# Patient Record
Sex: FEMALE | Race: WHITE | NOT HISPANIC OR LATINO | URBAN - METROPOLITAN AREA
[De-identification: names, ages, dates, MRNs, and addresses within clinical notes are randomized per-mention and may not be internally consistent; named-entity substitution may affect disease eponyms.]

---

## 2021-08-03 ENCOUNTER — EMERGENCY (EMERGENCY)
Facility: HOSPITAL | Age: 31
LOS: 1 days | Discharge: HOME | End: 2021-08-03
Admitting: EMERGENCY MEDICINE
Payer: COMMERCIAL

## 2021-08-03 VITALS
TEMPERATURE: 98 F | SYSTOLIC BLOOD PRESSURE: 116 MMHG | RESPIRATION RATE: 16 BRPM | HEART RATE: 114 BPM | OXYGEN SATURATION: 98 % | HEIGHT: 66 IN | WEIGHT: 154.98 LBS | DIASTOLIC BLOOD PRESSURE: 82 MMHG

## 2021-08-03 VITALS
TEMPERATURE: 99 F | RESPIRATION RATE: 16 BRPM | HEART RATE: 85 BPM | SYSTOLIC BLOOD PRESSURE: 115 MMHG | OXYGEN SATURATION: 98 % | DIASTOLIC BLOOD PRESSURE: 76 MMHG

## 2021-08-03 DIAGNOSIS — R21 RASH AND OTHER NONSPECIFIC SKIN ERUPTION: ICD-10-CM

## 2021-08-03 DIAGNOSIS — Z86.16 PERSONAL HISTORY OF COVID-19: ICD-10-CM

## 2021-08-03 DIAGNOSIS — B09 UNSPECIFIED VIRAL INFECTION CHARACTERIZED BY SKIN AND MUCOUS MEMBRANE LESIONS: ICD-10-CM

## 2021-08-03 DIAGNOSIS — F90.9 ATTENTION-DEFICIT HYPERACTIVITY DISORDER, UNSPECIFIED TYPE: ICD-10-CM

## 2021-08-03 DIAGNOSIS — F41.9 ANXIETY DISORDER, UNSPECIFIED: ICD-10-CM

## 2021-08-03 PROCEDURE — 99283 EMERGENCY DEPT VISIT LOW MDM: CPT

## 2021-08-03 NOTE — ED PROVIDER NOTE - CARE PROVIDERS DIRECT ADDRESSES
,[email or web address]

## 2021-08-03 NOTE — ED PROVIDER NOTE - PROVIDER TOKENS
PROVIDER:[TOKEN:[[id number]]],PROVIDER:[TOKEN:[[id number]]],PROVIDER:[TOKEN:[[id number]]]

## 2021-08-03 NOTE — ED ADULT TRIAGE NOTE - CHIEF COMPLAINT QUOTE
reports having a rash to groin last Thursday, now has progressed to arms and legs. denies pain or itching. no airway involvement. +appears comfortable

## 2021-08-03 NOTE — ED PROVIDER NOTE - OBJECTIVE STATEMENT
Patient presents with non pruritic rash that started over the R groin 6 days ago. she had spilled coffee over the R groin the previous day and had attributed rash to burn. Rash was initially more red and "angry" but has since improved. Patient presents with non pruritic rash that started over the R groin 6 days ago. she had spilled coffee over the R groin the previous day and had attributed rash to burn. Rash was initially more red and "angry" but has since improved. yesterday patient noted rash has spread to leg and arms. denies fever, throat swelling, tongue swelling, recent travel, use of new perfumes or detergents. denies any new medications. most recently had been started on lexapro 6months ago. admit to some nasal congestion that started before rash that she attributes to having the airconditioning on in her new apartment. denies hx of STD or possible exposures.

## 2021-08-03 NOTE — ED PROVIDER NOTE - CLINICAL SUMMARY MEDICAL DECISION MAKING FREE TEXT BOX
patient PMHX ADD, anxiety presents with nonpruritic papular rash over extremities sparing palms and soles. likely viral exanthem. advised follow up dermatologist.

## 2021-08-03 NOTE — ED ADULT NURSE NOTE - OBJECTIVE STATEMENT
rash to body since friday, initially started in let groin and spread. denies itching/pain. no known allergies, no s/s of distress, awaiting provider eval

## 2021-08-03 NOTE — ED ADULT NURSE NOTE - PRO INTERPRETER NEED 2
Per Dr. Conrad,  negative for infection.  Advise pt to drop of urine sample whenever she has sx of UTI.  Pt informed and verbalized understanding.  Standing UA ordered.  
English

## 2021-08-03 NOTE — ED PROVIDER NOTE - CARE PROVIDER_API CALL
Pauline Jimenez)  Dermatology  [address] [place] [address], [place] [address]  Phone: [phone]  Fax: [phone]  Follow Up Time:     Lizet Ocasio)  Dermatology [address], [address]  [place] [address]  Phone: [phone]  Fax: [phone]  Follow Up Time:     Brad Negrete ([address], [place] [address]  Phone: [phone]  Fax: [phone]  Follow Up Time:

## 2021-08-03 NOTE — ED PROVIDER NOTE - PATIENT PORTAL LINK FT
You can access the FollowMyHealth Patient Portal offered by [facility] by registering at the following website: [email or web address]. By joining [employer]’s FollowMyHealth portal, you will also be able to view your health information using other applications (apps) compatible with our system.

## 2021-08-03 NOTE — ED ADULT NURSE REASSESSMENT NOTE - NS ED NURSE REASSESS COMMENT FT1
Pt received from TINO Wolfe. Pt resting comfortably in stretcher, denies further complaints at this time. Awaiting dispo. Breathing spontaneous and nonlabored.

## 2021-08-27 ENCOUNTER — APPOINTMENT (OUTPATIENT)
Dept: DERMATOLOGY | Facility: CLINIC | Age: 31
End: 2021-08-27

## 2022-09-07 NOTE — ED ADULT NURSE NOTE - NSFALLRSKHARMRISK_ED_ALL_ED
· Patient with history of gastric bypass surgery  Has had significant malnutrition issues and anastomotic ulcerations  Seen by Bariatric Service during recent hospitalization at [facility] who recommended prolonged course of TPN for nutritional optimization and possible revision of surgery in the future  · Was planned to have appt with bariatric sx on 8/20 however had to be rescheduled  · Given recurrent bacteremia; s/p GI consult; have recommended discontinuation of TPN  Planned for PEG placement tomorrow  · Nutrition on board, caloric intake monitoring   Liberize diet, nutrition supplements   · Glucose monitoring, hypoglycemic protocol in place no

## 2023-04-06 VITALS
WEIGHT: 134.92 LBS | HEIGHT: 66 IN | HEART RATE: 106 BPM | RESPIRATION RATE: 16 BRPM | TEMPERATURE: 98 F | DIASTOLIC BLOOD PRESSURE: 82 MMHG | SYSTOLIC BLOOD PRESSURE: 112 MMHG | OXYGEN SATURATION: 99 %

## 2023-04-06 LAB
APTT BLD: 28.4 SEC — SIGNIFICANT CHANGE UP (ref 27.5–35.5)
D DIMER BLD IA.RAPID-MCNC: 427 NG/ML DDU — HIGH
INR BLD: 1.04 — SIGNIFICANT CHANGE UP (ref 0.88–1.16)
PROTHROM AB SERPL-ACNC: 12.2 SEC — SIGNIFICANT CHANGE UP (ref 10.5–13.4)

## 2023-04-06 PROCEDURE — 99285 EMERGENCY DEPT VISIT HI MDM: CPT

## 2023-04-06 NOTE — ED ADULT TRIAGE NOTE - CHIEF COMPLAINT QUOTE
Pt. walk in for syncope with head injury that occurred at 4pm this afternoon. Vomited on the way to ED.

## 2023-04-06 NOTE — ED ADULT NURSE NOTE - OBJECTIVE STATEMENT
32y female presents to ED c/o syncope. States was walking up the stairs, subsequently felt sob once arriving at the top and woke up on the floor. +loc. +hematoma to forehead. Pt denies cp.

## 2023-04-07 ENCOUNTER — INPATIENT (INPATIENT)
Facility: HOSPITAL | Age: 33
LOS: 2 days | Discharge: HOME | DRG: 312 | End: 2023-04-10
Attending: INTERNAL MEDICINE | Admitting: INTERNAL MEDICINE
Payer: COMMERCIAL

## 2023-04-07 DIAGNOSIS — D69.6 THROMBOCYTOPENIA, UNSPECIFIED: ICD-10-CM

## 2023-04-07 DIAGNOSIS — D72.829 ELEVATED WHITE BLOOD CELL COUNT, UNSPECIFIED: ICD-10-CM

## 2023-04-07 DIAGNOSIS — R55 SYNCOPE AND COLLAPSE: ICD-10-CM

## 2023-04-07 DIAGNOSIS — F41.9 ANXIETY DISORDER, UNSPECIFIED: ICD-10-CM

## 2023-04-07 PROBLEM — U07.1 COVID-19: Chronic | Status: ACTIVE | Noted: 2021-08-03

## 2023-04-07 PROBLEM — F90.9 ATTENTION-DEFICIT HYPERACTIVITY DISORDER, UNSPECIFIED TYPE: Chronic | Status: ACTIVE | Noted: 2021-08-03

## 2023-04-07 LAB
A1C WITH ESTIMATED AVERAGE GLUCOSE RESULT: 4.6 % — SIGNIFICANT CHANGE UP (ref 4–5.6)
ALBUMIN SERPL ELPH-MCNC: 3.6 G/DL — SIGNIFICANT CHANGE UP (ref 3.4–5)
ALBUMIN SERPL ELPH-MCNC: 3.7 G/DL — SIGNIFICANT CHANGE UP (ref 3.3–5)
ALP SERPL-CCNC: 65 U/L — SIGNIFICANT CHANGE UP (ref 40–120)
ALP SERPL-CCNC: 75 U/L — SIGNIFICANT CHANGE UP (ref 40–120)
ALT FLD-CCNC: 22 U/L — SIGNIFICANT CHANGE UP (ref 10–45)
ALT FLD-CCNC: 40 U/L — SIGNIFICANT CHANGE UP (ref 12–42)
ANION GAP SERPL CALC-SCNC: 12 MMOL/L — SIGNIFICANT CHANGE UP (ref 5–17)
ANION GAP SERPL CALC-SCNC: 9 MMOL/L — SIGNIFICANT CHANGE UP (ref 9–16)
ANISOCYTOSIS BLD QL: SLIGHT — SIGNIFICANT CHANGE UP
APPEARANCE UR: CLEAR — SIGNIFICANT CHANGE UP
APTT BLD: 27.2 SEC — LOW (ref 27.5–35.5)
AST SERPL-CCNC: 35 U/L — SIGNIFICANT CHANGE UP (ref 10–40)
AST SERPL-CCNC: 51 U/L — HIGH (ref 15–37)
BASOPHILS # BLD AUTO: 0 K/UL — SIGNIFICANT CHANGE UP (ref 0–0.2)
BASOPHILS # BLD AUTO: 0.04 K/UL — SIGNIFICANT CHANGE UP (ref 0–0.2)
BASOPHILS # BLD AUTO: 0.07 K/UL — SIGNIFICANT CHANGE UP (ref 0–0.2)
BASOPHILS # BLD AUTO: 0.26 K/UL — HIGH (ref 0–0.2)
BASOPHILS NFR BLD AUTO: 0 % — SIGNIFICANT CHANGE UP (ref 0–2)
BASOPHILS NFR BLD AUTO: 0.4 % — SIGNIFICANT CHANGE UP (ref 0–2)
BASOPHILS NFR BLD AUTO: 0.5 % — SIGNIFICANT CHANGE UP (ref 0–2)
BASOPHILS NFR BLD AUTO: 1.7 % — SIGNIFICANT CHANGE UP (ref 0–2)
BILIRUB SERPL-MCNC: 0.2 MG/DL — SIGNIFICANT CHANGE UP (ref 0.2–1.2)
BILIRUB SERPL-MCNC: 0.3 MG/DL — SIGNIFICANT CHANGE UP (ref 0.2–1.2)
BILIRUB UR-MCNC: NEGATIVE — SIGNIFICANT CHANGE UP
BLD GP AB SCN SERPL QL: NEGATIVE — SIGNIFICANT CHANGE UP
BUN SERPL-MCNC: 8 MG/DL — SIGNIFICANT CHANGE UP (ref 7–23)
BUN SERPL-MCNC: 9 MG/DL — SIGNIFICANT CHANGE UP (ref 7–23)
CALCIUM SERPL-MCNC: 8.6 MG/DL — SIGNIFICANT CHANGE UP (ref 8.4–10.5)
CALCIUM SERPL-MCNC: 9.1 MG/DL — SIGNIFICANT CHANGE UP (ref 8.5–10.5)
CHLORIDE SERPL-SCNC: 95 MMOL/L — LOW (ref 96–108)
CHLORIDE SERPL-SCNC: 99 MMOL/L — SIGNIFICANT CHANGE UP (ref 96–108)
CHOLEST SERPL-MCNC: 174 MG/DL — SIGNIFICANT CHANGE UP
CK MB CFR SERPL CALC: 12.2 NG/ML — HIGH (ref 0–6.7)
CK MB CFR SERPL CALC: 8.1 NG/ML — HIGH (ref 0–6.7)
CK SERPL-CCNC: 142 U/L — SIGNIFICANT CHANGE UP (ref 25–170)
CK SERPL-CCNC: 168 U/L — SIGNIFICANT CHANGE UP (ref 25–170)
CLOSURE TME COLL+EPINEP BLD: 21 K/UL — LOW (ref 150–400)
CO2 SERPL-SCNC: 28 MMOL/L — SIGNIFICANT CHANGE UP (ref 22–31)
CO2 SERPL-SCNC: 32 MMOL/L — HIGH (ref 22–31)
COLOR SPEC: YELLOW — SIGNIFICANT CHANGE UP
CREAT SERPL-MCNC: 0.56 MG/DL — SIGNIFICANT CHANGE UP (ref 0.5–1.3)
CREAT SERPL-MCNC: 0.9 MG/DL — SIGNIFICANT CHANGE UP (ref 0.5–1.3)
DIFF PNL FLD: NEGATIVE — SIGNIFICANT CHANGE UP
EGFR: 126 ML/MIN/1.73M2 — SIGNIFICANT CHANGE UP
EGFR: 88 ML/MIN/1.73M2 — SIGNIFICANT CHANGE UP
EOSINOPHIL # BLD AUTO: 0 K/UL — SIGNIFICANT CHANGE UP (ref 0–0.5)
EOSINOPHIL # BLD AUTO: 0.06 K/UL — SIGNIFICANT CHANGE UP (ref 0–0.5)
EOSINOPHIL # BLD AUTO: 0.1 K/UL — SIGNIFICANT CHANGE UP (ref 0–0.5)
EOSINOPHIL # BLD AUTO: 0.14 K/UL — SIGNIFICANT CHANGE UP (ref 0–0.5)
EOSINOPHIL NFR BLD AUTO: 0 % — SIGNIFICANT CHANGE UP (ref 0–6)
EOSINOPHIL NFR BLD AUTO: 0.6 % — SIGNIFICANT CHANGE UP (ref 0–6)
EOSINOPHIL NFR BLD AUTO: 0.7 % — SIGNIFICANT CHANGE UP (ref 0–6)
EOSINOPHIL NFR BLD AUTO: 0.9 % — SIGNIFICANT CHANGE UP (ref 0–6)
ESTIMATED AVERAGE GLUCOSE: 85 MG/DL — SIGNIFICANT CHANGE UP (ref 68–114)
FIBRINOGEN PPP-MCNC: 349 MG/DL — SIGNIFICANT CHANGE UP (ref 200–445)
FOLATE SERPL-MCNC: 5.8 NG/ML — SIGNIFICANT CHANGE UP
GIANT PLATELETS BLD QL SMEAR: PRESENT — SIGNIFICANT CHANGE UP
GLUCOSE SERPL-MCNC: 89 MG/DL — SIGNIFICANT CHANGE UP (ref 70–99)
GLUCOSE SERPL-MCNC: 92 MG/DL — SIGNIFICANT CHANGE UP (ref 70–99)
GLUCOSE UR QL: NEGATIVE — SIGNIFICANT CHANGE UP
HAPTOGLOB SERPL-MCNC: 188 MG/DL — SIGNIFICANT CHANGE UP (ref 34–200)
HAV IGM SER-ACNC: SIGNIFICANT CHANGE UP
HBV CORE IGM SER-ACNC: SIGNIFICANT CHANGE UP
HBV SURFACE AG SER-ACNC: SIGNIFICANT CHANGE UP
HCG SERPL-ACNC: <0 MIU/ML — SIGNIFICANT CHANGE UP
HCG SERPL-ACNC: <1 MIU/ML — SIGNIFICANT CHANGE UP
HCT VFR BLD CALC: 33.2 % — LOW (ref 34.5–45)
HCT VFR BLD CALC: 33.3 % — LOW (ref 34.5–45)
HCT VFR BLD CALC: 35.3 % — SIGNIFICANT CHANGE UP (ref 34.5–45)
HCT VFR BLD CALC: 37.2 % — SIGNIFICANT CHANGE UP (ref 34.5–45)
HDLC SERPL-MCNC: 56 MG/DL — SIGNIFICANT CHANGE UP
HGB BLD-MCNC: 11.5 G/DL — SIGNIFICANT CHANGE UP (ref 11.5–15.5)
HGB BLD-MCNC: 11.5 G/DL — SIGNIFICANT CHANGE UP (ref 11.5–15.5)
HGB BLD-MCNC: 12.2 G/DL — SIGNIFICANT CHANGE UP (ref 11.5–15.5)
HGB BLD-MCNC: 12.6 G/DL — SIGNIFICANT CHANGE UP (ref 11.5–15.5)
HYPOCHROMIA BLD QL: SLIGHT — SIGNIFICANT CHANGE UP
IMM GRANULOCYTES NFR BLD AUTO: 1.6 % — HIGH (ref 0–0.9)
IMM GRANULOCYTES NFR BLD AUTO: 1.8 % — HIGH (ref 0–0.9)
INR BLD: 1.03 — SIGNIFICANT CHANGE UP (ref 0.88–1.16)
IRON SATN MFR SERPL: 105 UG/DL — SIGNIFICANT CHANGE UP (ref 30–160)
KETONES UR-MCNC: 15 MG/DL
LDH SERPL L TO P-CCNC: 193 U/L — SIGNIFICANT CHANGE UP (ref 50–242)
LEUKOCYTE ESTERASE UR-ACNC: NEGATIVE — SIGNIFICANT CHANGE UP
LIPID PNL WITH DIRECT LDL SERPL: 78 MG/DL — SIGNIFICANT CHANGE UP
LYMPHOCYTES # BLD AUTO: 0.94 K/UL — LOW (ref 1–3.3)
LYMPHOCYTES # BLD AUTO: 15.1 % — SIGNIFICANT CHANGE UP (ref 13–44)
LYMPHOCYTES # BLD AUTO: 17.4 % — SIGNIFICANT CHANGE UP (ref 13–44)
LYMPHOCYTES # BLD AUTO: 2.21 K/UL — SIGNIFICANT CHANGE UP (ref 1–3.3)
LYMPHOCYTES # BLD AUTO: 2.61 K/UL — SIGNIFICANT CHANGE UP (ref 1–3.3)
LYMPHOCYTES # BLD AUTO: 28 % — SIGNIFICANT CHANGE UP (ref 13–44)
LYMPHOCYTES # BLD AUTO: 3.66 K/UL — HIGH (ref 1–3.3)
LYMPHOCYTES # BLD AUTO: 9 % — LOW (ref 13–44)
MAGNESIUM SERPL-MCNC: 1.2 MG/DL — LOW (ref 1.6–2.6)
MAGNESIUM SERPL-MCNC: 1.3 MG/DL — LOW (ref 1.6–2.6)
MANUAL SMEAR VERIFICATION: SIGNIFICANT CHANGE UP
MANUAL SMEAR VERIFICATION: SIGNIFICANT CHANGE UP
MCHC RBC-ENTMCNC: 30.9 PG — SIGNIFICANT CHANGE UP (ref 27–34)
MCHC RBC-ENTMCNC: 31 PG — SIGNIFICANT CHANGE UP (ref 27–34)
MCHC RBC-ENTMCNC: 31.1 PG — SIGNIFICANT CHANGE UP (ref 27–34)
MCHC RBC-ENTMCNC: 31.1 PG — SIGNIFICANT CHANGE UP (ref 27–34)
MCHC RBC-ENTMCNC: 33.9 GM/DL — SIGNIFICANT CHANGE UP (ref 32–36)
MCHC RBC-ENTMCNC: 34.5 GM/DL — SIGNIFICANT CHANGE UP (ref 32–36)
MCHC RBC-ENTMCNC: 34.6 GM/DL — SIGNIFICANT CHANGE UP (ref 32–36)
MCHC RBC-ENTMCNC: 34.6 GM/DL — SIGNIFICANT CHANGE UP (ref 32–36)
MCV RBC AUTO: 89.4 FL — SIGNIFICANT CHANGE UP (ref 80–100)
MCV RBC AUTO: 89.5 FL — SIGNIFICANT CHANGE UP (ref 80–100)
MCV RBC AUTO: 90 FL — SIGNIFICANT CHANGE UP (ref 80–100)
MCV RBC AUTO: 91.9 FL — SIGNIFICANT CHANGE UP (ref 80–100)
METAMYELOCYTES # FLD: 1 % — HIGH (ref 0–0)
MICROCYTES BLD QL: SLIGHT — SIGNIFICANT CHANGE UP
MONOCYTES # BLD AUTO: 0.31 K/UL — SIGNIFICANT CHANGE UP (ref 0–0.9)
MONOCYTES # BLD AUTO: 0.72 K/UL — SIGNIFICANT CHANGE UP (ref 0–0.9)
MONOCYTES # BLD AUTO: 0.78 K/UL — SIGNIFICANT CHANGE UP (ref 0–0.9)
MONOCYTES # BLD AUTO: 0.78 K/UL — SIGNIFICANT CHANGE UP (ref 0–0.9)
MONOCYTES NFR BLD AUTO: 3 % — SIGNIFICANT CHANGE UP (ref 2–14)
MONOCYTES NFR BLD AUTO: 4.9 % — SIGNIFICANT CHANGE UP (ref 2–14)
MONOCYTES NFR BLD AUTO: 5.2 % — SIGNIFICANT CHANGE UP (ref 2–14)
MONOCYTES NFR BLD AUTO: 6 % — SIGNIFICANT CHANGE UP (ref 2–14)
MYELOCYTES NFR BLD: 0.9 % — HIGH (ref 0–0)
NEUTROPHILS # BLD AUTO: 11.09 K/UL — HIGH (ref 1.8–7.4)
NEUTROPHILS # BLD AUTO: 11.28 K/UL — HIGH (ref 1.8–7.4)
NEUTROPHILS # BLD AUTO: 8.5 K/UL — HIGH (ref 1.8–7.4)
NEUTROPHILS # BLD AUTO: 8.94 K/UL — HIGH (ref 1.8–7.4)
NEUTROPHILS NFR BLD AUTO: 64 % — SIGNIFICANT CHANGE UP (ref 43–77)
NEUTROPHILS NFR BLD AUTO: 73.9 % — SIGNIFICANT CHANGE UP (ref 43–77)
NEUTROPHILS NFR BLD AUTO: 77 % — SIGNIFICANT CHANGE UP (ref 43–77)
NEUTROPHILS NFR BLD AUTO: 85.4 % — HIGH (ref 43–77)
NEUTS BAND # BLD: 1 % — SIGNIFICANT CHANGE UP (ref 0–8)
NITRITE UR-MCNC: NEGATIVE — SIGNIFICANT CHANGE UP
NON HDL CHOLESTEROL: 118 MG/DL — SIGNIFICANT CHANGE UP
NRBC # BLD: 0 /100 WBCS — SIGNIFICANT CHANGE UP (ref 0–0)
NRBC # BLD: 0 /100 — SIGNIFICANT CHANGE UP (ref 0–0)
NRBC # BLD: SIGNIFICANT CHANGE UP /100 WBCS (ref 0–0)
OVALOCYTES BLD QL SMEAR: SLIGHT — SIGNIFICANT CHANGE UP
PH UR: 6.5 — SIGNIFICANT CHANGE UP (ref 5–8)
PLAT MORPH BLD: ABNORMAL
PLAT MORPH BLD: NORMAL — SIGNIFICANT CHANGE UP
PLATELET # BLD AUTO: 21 K/UL — LOW (ref 150–400)
PLATELET # BLD AUTO: 24 K/UL — LOW (ref 150–400)
PLATELET # BLD AUTO: 26 K/UL — LOW (ref 150–400)
PLATELET # BLD AUTO: 30 K/UL — LOW (ref 150–400)
PLATELET COUNT - ESTIMATE: ABNORMAL
POIKILOCYTOSIS BLD QL AUTO: SLIGHT — SIGNIFICANT CHANGE UP
POTASSIUM SERPL-MCNC: 4 MMOL/L — SIGNIFICANT CHANGE UP (ref 3.5–5.3)
POTASSIUM SERPL-MCNC: 4.3 MMOL/L — SIGNIFICANT CHANGE UP (ref 3.5–5.3)
POTASSIUM SERPL-SCNC: 4 MMOL/L — SIGNIFICANT CHANGE UP (ref 3.5–5.3)
POTASSIUM SERPL-SCNC: 4.3 MMOL/L — SIGNIFICANT CHANGE UP (ref 3.5–5.3)
PROT SERPL-MCNC: 6.3 G/DL — SIGNIFICANT CHANGE UP (ref 6–8.3)
PROT SERPL-MCNC: 7.5 G/DL — SIGNIFICANT CHANGE UP (ref 6.4–8.2)
PROT UR-MCNC: NEGATIVE MG/DL — SIGNIFICANT CHANGE UP
PROTHROM AB SERPL-ACNC: 12.3 SEC — SIGNIFICANT CHANGE UP (ref 10.5–13.4)
RAPID RVP RESULT: SIGNIFICANT CHANGE UP
RBC # BLD: 3.7 M/UL — LOW (ref 3.8–5.2)
RBC # BLD: 3.71 M/UL — LOW (ref 3.8–5.2)
RBC # BLD: 3.83 M/UL — SIGNIFICANT CHANGE UP (ref 3.8–5.2)
RBC # BLD: 3.95 M/UL — SIGNIFICANT CHANGE UP (ref 3.8–5.2)
RBC # BLD: 4.05 M/UL — SIGNIFICANT CHANGE UP (ref 3.8–5.2)
RBC # FLD: 12.4 % — SIGNIFICANT CHANGE UP (ref 10.3–14.5)
RBC # FLD: 12.5 % — SIGNIFICANT CHANGE UP (ref 10.3–14.5)
RBC # FLD: 12.6 % — SIGNIFICANT CHANGE UP (ref 10.3–14.5)
RBC # FLD: 12.7 % — SIGNIFICANT CHANGE UP (ref 10.3–14.5)
RBC BLD AUTO: ABNORMAL
RBC BLD AUTO: NORMAL — SIGNIFICANT CHANGE UP
RETICS #: 100.7 K/UL — SIGNIFICANT CHANGE UP (ref 25–125)
RETICS/RBC NFR: 2.6 % — HIGH (ref 0.5–2.5)
RH IG SCN BLD-IMP: POSITIVE — SIGNIFICANT CHANGE UP
SARS-COV-2 RNA SPEC QL NAA+PROBE: SIGNIFICANT CHANGE UP
SARS-COV-2 RNA SPEC QL NAA+PROBE: SIGNIFICANT CHANGE UP
SODIUM SERPL-SCNC: 135 MMOL/L — SIGNIFICANT CHANGE UP (ref 135–145)
SODIUM SERPL-SCNC: 140 MMOL/L — SIGNIFICANT CHANGE UP (ref 132–145)
SP GR SPEC: 1.01 — SIGNIFICANT CHANGE UP (ref 1–1.03)
TRIGL SERPL-MCNC: 198 MG/DL — HIGH
TROPONIN I, HIGH SENSITIVITY RESULT: 9153.5 NG/L — HIGH
TROPONIN I, HIGH SENSITIVITY RESULT: HIGH NG/L
TROPONIN T SERPL-MCNC: 0.19 NG/ML — CRITICAL HIGH (ref 0–0.01)
TROPONIN T SERPL-MCNC: 0.32 NG/ML — CRITICAL HIGH (ref 0–0.01)
TSH SERPL-MCNC: 2.42 UIU/ML — SIGNIFICANT CHANGE UP (ref 0.27–4.2)
UROBILINOGEN FLD QL: 0.2 E.U./DL — SIGNIFICANT CHANGE UP
VIT B12 SERPL-MCNC: 380 PG/ML — SIGNIFICANT CHANGE UP (ref 232–1245)
WBC # BLD: 10.46 K/UL — SIGNIFICANT CHANGE UP (ref 3.8–10.5)
WBC # BLD: 13.08 K/UL — HIGH (ref 3.8–10.5)
WBC # BLD: 14.64 K/UL — HIGH (ref 3.8–10.5)
WBC # BLD: 15.01 K/UL — HIGH (ref 3.8–10.5)
WBC # FLD AUTO: 10.46 K/UL — SIGNIFICANT CHANGE UP (ref 3.8–10.5)
WBC # FLD AUTO: 13.08 K/UL — HIGH (ref 3.8–10.5)
WBC # FLD AUTO: 14.64 K/UL — HIGH (ref 3.8–10.5)
WBC # FLD AUTO: 15.01 K/UL — HIGH (ref 3.8–10.5)

## 2023-04-07 PROCEDURE — 70450 CT HEAD/BRAIN W/O DYE: CPT | Mod: 26

## 2023-04-07 PROCEDURE — 99223 1ST HOSP IP/OBS HIGH 75: CPT

## 2023-04-07 PROCEDURE — 93306 TTE W/DOPPLER COMPLETE: CPT | Mod: 26

## 2023-04-07 PROCEDURE — 75574 CT ANGIO HRT W/3D IMAGE: CPT | Mod: 26

## 2023-04-07 PROCEDURE — 75561 CARDIAC MRI FOR MORPH W/DYE: CPT | Mod: 26

## 2023-04-07 PROCEDURE — 99222 1ST HOSP IP/OBS MODERATE 55: CPT

## 2023-04-07 PROCEDURE — 88189 FLOWCYTOMETRY/READ 16 & >: CPT

## 2023-04-07 PROCEDURE — 71275 CT ANGIOGRAPHY CHEST: CPT | Mod: 26

## 2023-04-07 PROCEDURE — 93010 ELECTROCARDIOGRAM REPORT: CPT

## 2023-04-07 RX ORDER — NORETHINDRONE AND ETHINYL ESTRADIOL 0.4-0.035
1 KIT ORAL
Refills: 0 | DISCHARGE

## 2023-04-07 RX ORDER — ASPIRIN/CALCIUM CARB/MAGNESIUM 324 MG
325 TABLET ORAL ONCE
Refills: 0 | Status: COMPLETED | OUTPATIENT
Start: 2023-04-07 | End: 2023-04-07

## 2023-04-07 RX ORDER — ESCITALOPRAM OXALATE 10 MG/1
1 TABLET, FILM COATED ORAL
Refills: 0 | DISCHARGE

## 2023-04-07 RX ORDER — MAGNESIUM SULFATE 500 MG/ML
4 VIAL (ML) INJECTION ONCE
Refills: 0 | Status: COMPLETED | OUTPATIENT
Start: 2023-04-07 | End: 2023-04-07

## 2023-04-07 RX ORDER — DEXAMETHASONE 0.5 MG/5ML
40 ELIXIR ORAL DAILY
Refills: 0 | Status: DISCONTINUED | OUTPATIENT
Start: 2023-04-07 | End: 2023-04-08

## 2023-04-07 RX ORDER — MAGNESIUM SULFATE 500 MG/ML
2 VIAL (ML) INJECTION ONCE
Refills: 0 | Status: COMPLETED | OUTPATIENT
Start: 2023-04-07 | End: 2023-04-07

## 2023-04-07 RX ADMIN — Medication 25 GRAM(S): at 11:53

## 2023-04-07 RX ADMIN — Medication 120 MILLIGRAM(S): at 20:56

## 2023-04-07 RX ADMIN — Medication 325 MILLIGRAM(S): at 03:18

## 2023-04-07 RX ADMIN — Medication 25 GRAM(S): at 00:56

## 2023-04-07 NOTE — H&P ADULT - ASSESSMENT
31yo female with hx of OCP use and PMH of anxiety (on Lexapro) who initially presented to [facility] 4/6/23 s/p syncopal episode. Pt states that before the syncopal event she was walking up 6 flights of stairs with her laundry and felt SOB but reported that was normal for her. Admits to head trauma and episodes of vomiting on her way to the ED following the event. At [facility] pt w/ trop I 23866, EKG with LVH and diffuse ST depressions. Pt also w/ thrombocytopenia and leukocytosis. Pt transferred to [facility] for further management of syncope.

## 2023-04-07 NOTE — H&P ADULT - PROBLEM SELECTOR PLAN 3
Pt presented with PLT of 30 trending to 24  - No h/o of thrombocytopenia f/u platelet count   - Consider Heme/onc consult Pt presented with PLT of 30 trending to 24  - No h/o of thrombocytopenia f/u platelet count   - Heme/Onc consulted - f/u recs

## 2023-04-07 NOTE — ED PROVIDER NOTE - OBJECTIVE STATEMENT
31 y/o female hx of OCP use now here with syncopal episode while walking up the stairs stating she hit her head. Patient stating she vomited once on her way here. Denies chest pain. Denies fever, chills, hematuria, vaginal bleeding, d/c, abdominal pain, change in bowel function, flank pain, rash, HA, dizziness, SOB, CP, palpitations, diaphoresis, cough, and malaise. Patient states she drinks two glasses of wine everyday but does not get withdrawal symptoms when she stops. Appearing well NAD

## 2023-04-07 NOTE — SBIRT NOTE ADULT - NSSBIRTBRIEFINTDET_GEN_A_CORE
Patient reports she drinks alcohol 3-4 times weekly/at least 3-4 drinks. Patient declines resources at this time.

## 2023-04-07 NOTE — H&P ADULT - NSHPLABSRESULTS_GEN_ALL_CORE
11.5   15.01 )-----------( 24       ( 07 Apr 2023 06:39 )             33.2       04-06    140  |  99  |  9   ----------------------------<  92  4.3   |  32<H>  |  0.90    Ca    9.1      06 Apr 2023 23:39  Mg     1.3     04-06    TPro  7.5  /  Alb  3.6  /  TBili  0.2  /  DBili  x   /  AST  51<H>  /  ALT  40  /  AlkPhos  75  04-06    PT/INR - ( 07 Apr 2023 06:39 )   PT: 12.3 sec;   INR: 1.03        PTT - ( 07 Apr 2023 06:39 )  PTT:27.2 sec    EKG: rate 76, LVH, Diffuse ST depressions

## 2023-04-07 NOTE — H&P ADULT - HISTORY OF PRESENT ILLNESS
Incomplete    31yo female with hx of OCP use and PMH of anxiety (on Lexapro) who initially presented to [facility] 4/6/23 s/p syncopal episode. Pt states that before the syncopal event she was walking up 6 flights of stairs with her laundry and felt SOB but reported that was normal for her. Admits to head trauma and episodes of vomiting on her way to the ED following the event. States that this has never happened to her before and denies any cardiac hx. Patient denies any preceding symptoms of nausea, diaphoresis, palpitations, dizziness, chest pain, SOB, recent travel or sick contacts. In [facility], BP: 112/82, HR: 100s, RR: 16, Temp: 97.9F, O2 sat: 99% RA, Fingerstick 96. EKG revealed 76bpm, LVH and diffuse ST depressions throughout. Labs notable for: WBC 14.64 with left shift, Platlets 30k-->26k, Mag 1.3, Troponin I 9153 with second set 30558, D-dimer 427. CT head unremarkable. CTA Chest negative for PE (limited study 2/2 motion artifact). Patient treated with ASA 325mg PO x 1 dose and Mg 2g IV x 1 dose. Patient now transferred to [facility] cardiac telemetry for serial enzymes and further cardiac work-up. Pt upon arrival to [facility] pt does not appear to be in distress but is anxious, VSS, cardiology team will continue to monitor.    33yo female, with family hx of QT prolongation (mother w/ icd) and aunt with who  of "enlarged heart" and PMHx of  anxiety (on Lexapro) and OCP use,  who initially presented to [facility] 23 s/p syncopal episode.. Pt states that before the syncopal event she was walking up 6 flights of stairs with her laundry and felt SOB but reported that was normal for her. Admits to head trauma and episodes of vomiting on her way to the ED following the event. States that this has never happened to her before and denies any cardiac hx.  Patient reports hx of easy bruising and rash when exposed to cold over the past year. Patient also notices her appetite has not been the same since she had COVID in . Patient denies any preceding symptoms of nausea, diaphoresis, palpitations, dizziness, chest pain, SOB, recent travel or sick contacts. In [facility], BP: 112/82, HR: 100s, RR: 16, Temp: 97.9F, O2 sat: 99% RA, Fingerstick 96. EKG revealed 76bpm, LVH and diffuse ST depressions throughout. Labs notable for: WBC 14.64 with left shift,  Platelets 30k-->26k, Mag 1.3, Troponin I 9153 with second set 44574, D-dimer 427. CT head unremarkable. CTA Chest negative for PE (limited study 2/2 motion artifact). Patient treated with ASA 325mg PO x 1 dose and Mg 2g IV x 1 dose. Patient now transferred to [facility] cardiac telemetry for serial enzymes and further cardiac work-up. Pt upon arrival to [facility] pt does not appear to be in distress but is anxious, VSS, cardiology team will continue to monitor.

## 2023-04-07 NOTE — CONSULT NOTE ADULT - SUBJECTIVE AND OBJECTIVE BOX
*** NOTE IN PROGRESS ***    Hematology Oncology Progress / Consult Note      HPI:  33yo female, with family hx of QT prolongation (mother w/ icd) and aunt with who  of "enlarged heart" and PMHx of  anxiety (on Lexapro) and OCP use,  who initially presented to [facility] 23 s/p syncopal episode.. Pt states that before the syncopal event she was walking up 6 flights of stairs with her laundry and felt SOB but reported that was normal for her. Admits to head trauma and episodes of vomiting on her way to the ED following the event. States that this has never happened to her before and denies any cardiac hx.  Patient reports hx of easy bruising and rash when exposed to cold over the past year. Patient also notices her appetite has not been the same since she had COVID in . Patient denies any preceding symptoms of nausea, diaphoresis, palpitations, dizziness, chest pain, SOB, recent travel or sick contacts. In [facility], BP: 112/82, HR: 100s, RR: 16, Temp: 97.9F, O2 sat: 99% RA, Fingerstick 96. EKG revealed 76bpm, LVH and diffuse ST depressions throughout. Labs notable for: WBC 14.64 with left shift,  Platelets 30k-->26k, Mag 1.3, Troponin I 9153 with second set 69770, D-dimer 427. CT head unremarkable. CTA Chest negative for PE (limited study 2/2 motion artifact). Patient treated with ASA 325mg PO x 1 dose and Mg 2g IV x 1 dose. Patient now transferred to [facility] cardiac telemetry for serial enzymes and further cardiac work-up. Pt upon arrival to [facility] pt does not appear to be in distress but is anxious, VSS, cardiology team will continue to monitor.    (2023 07:32)      Interval History:    SUBJECTIVE: Patient seen and examined at bedside.    OBJECTIVE:    VITAL SIGNS:  ICU Vital Signs Last 24 Hrs  T(C): 36.4 (2023 13:28), Max: 37.1 (2023 01:24)  T(F): 97.6 (2023 13:28), Max: 98.7 (2023 01:24)  HR: 70 (2023 11:51) (65 - 106)  BP: 116/67 (2023 11:51) (107/69 - 125/65)  BP(mean): 87 (2023 06:32) (87 - 87)  ABP: --  ABP(mean): --  RR: 18 (2023 11:51) (16 - 20)  SpO2: 98% (2023 11:51) (97% - 100%)    O2 Parameters below as of 2023 11:51  Patient On (Oxygen Delivery Method): room air              - @ 07:01  -   @ 13:33  --------------------------------------------------------  IN: 0 mL / OUT: 0 mL / NET: 0 mL      CAPILLARY BLOOD GLUCOSE      POCT Blood Glucose.: 96 mg/dL (2023 23:04)      PHYSICAL EXAM:  General: NAD, answering questions, pleasantly conversant  HEENT: NC/AT; PERRL, clear conjunctiva  Neck: supple  Respiratory: CTA b/l  Cardiovascular: +S1/S2; RRR  Abdomen: soft, NT/ND; +BS x4  Extremities: WWP, 2+ peripheral pulses b/l; no LE edema  Skin: normal color and turgor; no rash  Neurological:     MEDICATIONS:  MEDICATIONS  (STANDING):    MEDICATIONS  (PRN):      ALLERGIES:  Allergies    No Known Allergies    Intolerances        LABS:                        11.5   15.01 )-----------( 24       ( 2023 06:39 )             33.2     04-07    135  |  95<L>  |  8   ----------------------------<  89  4.0   |  28  |  0.56    Ca    8.6      2023 06:39  Mg     1.2     04-07    TPro  6.3  /  Alb  3.7  /  TBili  0.3  /  DBili  x   /  AST  35  /  ALT  22  /  AlkPhos  65  04-07    PT/INR - ( 2023 06:39 )   PT: 12.3 sec;   INR: 1.03          PTT - ( 2023 06:39 )  PTT:27.2 sec                RADIOLOGY & ADDITIONAL TESTS: Reviewed.   Hematology Oncology Consult Note      HPI:  33yo female, with family hx of QT prolongation (mother w/ icd) and aunt with who  of "enlarged heart" and PMHx of  anxiety (on Lexapro) and OCP use,  who initially presented to [facility] 23 s/p syncopal episode.. Pt states that before the syncopal event she was walking up 6 flights of stairs with her laundry and felt SOB but reported that was normal for her. Admits to head trauma and episodes of vomiting on her way to the ED following the event. States that this has never happened to her before and denies any cardiac hx.  Patient reports hx of easy bruising and rash when exposed to cold over the past year. Patient also notices her appetite has not been the same since she had COVID in . Patient denies any preceding symptoms of nausea, diaphoresis, palpitations, dizziness, chest pain, SOB, recent travel or sick contacts. In [facility], BP: 112/82, HR: 100s, RR: 16, Temp: 97.9F, O2 sat: 99% RA, Fingerstick 96. EKG revealed 76bpm, LVH and diffuse ST depressions throughout. Labs notable for: WBC 14.64 with left shift,  Platelets 30k-->26k, Mag 1.3, Troponin I 9153 with second set 46500, D-dimer 427. CT head unremarkable. CTA Chest negative for PE (limited study 2/2 motion artifact). Patient treated with ASA 325mg PO x 1 dose and Mg 2g IV x 1 dose. Patient now transferred to [facility] cardiac telemetry for serial enzymes and further cardiac work-up. Pt upon arrival to [facility] pt does not appear to be in distress but is anxious, VSS, cardiology team will continue to monitor.    (2023 07:32)      SUBJECTIVE: Patient seen and examined at bedside. Anxious, but overall feeling better. Dizzy when sitting up too quickly. Denies numbness or tingling in extremities. Denies confusion. Denies fever, headache, nausea, vomiting, chest pain, shortness of breath, abdominal pain, changes in bowel movements or urination.     OBJECTIVE:    VITAL SIGNS:  ICU Vital Signs Last 24 Hrs  T(C): 36.4 (2023 13:28), Max: 37.1 (2023 01:24)  T(F): 97.6 (2023 13:28), Max: 98.7 (2023 01:24)  HR: 70 (2023 11:51) (65 - 106)  BP: 116/67 (2023 11:51) (107/69 - 125/65)  BP(mean): 87 (2023 06:32) (87 - 87)  ABP: --  ABP(mean): --  RR: 18 (2023 11:51) (16 - 20)  SpO2: 98% (2023 11:51) (97% - 100%)    O2 Parameters below as of 2023 11:51  Patient On (Oxygen Delivery Method): room air               @ 07:01  -   @ 13:33  --------------------------------------------------------  IN: 0 mL / OUT: 0 mL / NET: 0 mL      CAPILLARY BLOOD GLUCOSE      POCT Blood Glucose.: 96 mg/dL (2023 23:04)      PHYSICAL EXAM:  General: NAD, answering questions, pleasantly conversant  HEENT: NC, large soft tissue swelling and bruise on forehead; PERRL, clear conjunctiva, no oral petechiae  Neck: supple  Respiratory: CTA b/l  Cardiovascular: +S1/S2; RRR  Abdomen: soft, NT/ND; +BS x4  Extremities: WWP, 2+ peripheral pulses b/l; no LE edema  Skin: normal color and turgor; faint scattered areas of petechiae on arms and legs  Neurological: AAOx3    MEDICATIONS:  MEDICATIONS  (STANDING):    MEDICATIONS  (PRN):      ALLERGIES:  Allergies    No Known Allergies    Intolerances        LABS:                        11.5   15.01 )-----------( 24       ( 2023 06:39 )             33.2     04-07    135  |  95<L>  |  8   ----------------------------<  89  4.0   |  28  |  0.56    Ca    8.6      2023 06:39  Mg     1.2     04-07    TPro  6.3  /  Alb  3.7  /  TBili  0.3  /  DBili  x   /  AST  35  /  ALT  22  /  AlkPhos  65  04-07    PT/INR - ( 2023 06:39 )   PT: 12.3 sec;   INR: 1.03          PTT - ( 2023 06:39 )  PTT:27.2 sec                RADIOLOGY & ADDITIONAL TESTS: Reviewed.   Hematology Oncology Consult Note      HPI:  33yo female, with family hx of QT prolongation (mother w/ icd) and aunt with who  of "enlarged heart" and PMHx of  anxiety (on Lexapro) and OCP use,  who initially presented to [facility] 23 s/p syncopal episode.. Pt states that before the syncopal event she was walking up 6 flights of stairs with her laundry and felt SOB but reported that was normal for her. Admits to head trauma and episodes of vomiting on her way to the ED following the event. States that this has never happened to her before and denies any cardiac hx.  Patient reports hx of easy bruising and rash when exposed to cold over the past year. Patient also notices her appetite has not been the same since she had COVID in . Patient denies any preceding symptoms of nausea, diaphoresis, palpitations, dizziness, chest pain, SOB, recent travel or sick contacts. In [facility], BP: 112/82, HR: 100s, RR: 16, Temp: 97.9F, O2 sat: 99% RA, Fingerstick 96. EKG revealed 76bpm, LVH and diffuse ST depressions throughout. Labs notable for: WBC 14.64 with left shift,  Platelets 30k-->26k, Mag 1.3, Troponin I 9153 with second set 67871, D-dimer 427. CT head unremarkable. CTA Chest negative for PE (limited study 2/2 motion artifact). Patient treated with ASA 325mg PO x 1 dose and Mg 2g IV x 1 dose. Patient now transferred to [facility] cardiac telemetry for serial enzymes and further cardiac work-up. Pt upon arrival to [facility] pt does not appear to be in distress but is anxious, VSS, cardiology team will continue to monitor.    (2023 07:32)      SUBJECTIVE: Patient seen and examined at bedside. Anxious, but overall feeling better. Dizzy when sitting up too quickly. Denies numbness or tingling in extremities. Denies confusion. Denies fever, headache, nausea, vomiting, chest pain, shortness of breath, abdominal pain, changes in bowel movements or urination.     OBJECTIVE:    VITAL SIGNS:  ICU Vital Signs Last 24 Hrs  T(C): 36.4 (2023 13:28), Max: 37.1 (2023 01:24)  T(F): 97.6 (2023 13:28), Max: 98.7 (2023 01:24)  HR: 70 (2023 11:51) (65 - 106)  BP: 116/67 (2023 11:51) (107/69 - 125/65)  BP(mean): 87 (2023 06:32) (87 - 87)  ABP: --  ABP(mean): --  RR: 18 (2023 11:51) (16 - 20)  SpO2: 98% (2023 11:51) (97% - 100%)    O2 Parameters below as of 2023 11:51  Patient On (Oxygen Delivery Method): room air               @ 07:01  -   @ 13:33  --------------------------------------------------------  IN: 0 mL / OUT: 0 mL / NET: 0 mL      CAPILLARY BLOOD GLUCOSE      POCT Blood Glucose.: 96 mg/dL (2023 23:04)      PHYSICAL EXAM:  General: NAD, answering questions, pleasantly conversant  HEENT: NC, large soft tissue swelling and bruise on forehead; PERRL, clear conjunctiva, no oral petechiae  Neck: supple  Respiratory: CTA b/l  Cardiovascular: +S1/S2; RRR  Abdomen: soft, NT/ND; +BS x4  Extremities: WWP, 2+ peripheral pulses b/l; no LE edema  Skin: normal color and turgor; few faint scattered areas of petechiae on arms and legs  Neurological: AAOx3    MEDICATIONS:  MEDICATIONS  (STANDING):    MEDICATIONS  (PRN):      ALLERGIES:  Allergies    No Known Allergies    Intolerances        LABS:                        11.5   15.01 )-----------( 24       ( 2023 06:39 )             33.2     04-07    135  |  95<L>  |  8   ----------------------------<  89  4.0   |  28  |  0.56    Ca    8.6      2023 06:39  Mg     1.2     04-07    TPro  6.3  /  Alb  3.7  /  TBili  0.3  /  DBili  x   /  AST  35  /  ALT  22  /  AlkPhos  65  04-07    PT/INR - ( 2023 06:39 )   PT: 12.3 sec;   INR: 1.03          PTT - ( 2023 06:39 )  PTT:27.2 sec                RADIOLOGY & ADDITIONAL TESTS: Reviewed.

## 2023-04-07 NOTE — PATIENT PROFILE ADULT - TRANSPORTATION
Denies known Latex allergy or symptoms of Latex sensitivity.  Medications verified, no changes.  Tobacco verified.  Estela DEVI Lyric is a 52 year old female presenting with Left knee pain, started 2 days ago, pain is a 8 right now. Does not know what she did.    no

## 2023-04-07 NOTE — ED PROVIDER NOTE - ATTENDING APP SHARED VISIT CONTRIBUTION OF CARE
I have seen the pt, reviewed all pertinent clinical data, and I agree with the documentation/care/plan executed by STAN Kelly. + syncope in 33yo F w/abnormal EKG (inverted T waves inferolateral leads) with elevated troponin. Not describing CP in the ED, VSS w/reassuring exam, CTA negative for PE, no e/o DVT, + thrombocytopenia w/no known hx, d/w [facility] Dr. Hoffmann, accepted to cardio service. Per cardio, will hold heparin at this time 2/2 downtrending thrombocytopenia.

## 2023-04-07 NOTE — CONSULT NOTE ADULT - ASSESSMENT
31 yo female, FamHx mother w/ prolonged QT (likely acquired has PPM), Aunt who  of "enlarged heart" age 50, and PMHx of anxiety (on Lexapro) and OCP use who presented to [facility] 23 after syncopal episode after walking up 6 flights of stairs w/ prodrome of dizziness and SOB. Hematology consulted for thrombocytopenia.    # Severe thrombocytopenia  No personal history of thrombocytopenia. Has not had labs checked in several years. No personal or family history of bleeding or bruising disorders. No new medications or recent infections. Platelet trend from admission 30->26->24. Although PLASMIC score for TTP elevated at 6. However, LDH and haptoglobin are normal and peripheral smear review by fellow 23 does not show schistocytes, however, giant platelets are present. No PE noted on CT Angio PE protocol, however, elevated troponins. No renal dysfunction. Coagulation studies on admission WNL.    Differential includes idiopathic thrombocytopenia (giant platelets), less likely to be microangiopathic process such as TTP or HUS (LDH and Hapto WNL, no schistocytes, quick return to baseline, relatively stable platelet count), unlikely DIC (fibrinogen 349). Unclear if thrombocytopenia and syncopal episode are related or if patient with separate independent process.    Plan:  - Trend daily CBC with differential  - Please check retic count, LDH, haptoglobin  - Check HIV and Hep C  - Check Folate, B12, iron studies with ferritin  - Check RONALD, RF, anti CCP  - Check Lupus anticoagulant, anticardiolipin ab, and beta 2 glycoprotein  - Would send for flow cytometry   - ITP is a diagnosis of exlcusion, will discuss role for steroids with work up above and platelet trend  - Recommend stat CT head if changes in baseline neurologic exam  - Consider neurology evaluation if cardiac work up remains negative  - Transfuse for platelet count <10, <20 with fever, or with active bleeding <50  - Maintain active type and screen    # Leukocytosis  Mildly elevated WBC with neutrophil predominance on admission. No clear infectious etiology, possibly reactive from inflammation after falling. RVP negative.  - Trend daily CBC with differential    Discussed with hematology oncology attending Dr. Margy Wood. Recommendations are considered final after attending attestation.  33 yo female, FamHx mother w/ prolonged QT (likely acquired has PPM), Aunt who  of "enlarged heart" age 50, and PMHx of anxiety (on Lexapro) and OCP use who presented to [facility] 23 after syncopal episode after walking up 6 flights of stairs w/ prodrome of dizziness and SOB. Hematology consulted for thrombocytopenia.    # Thrombocytopenia  No personal history of thrombocytopenia. Has not had labs checked in several years. No personal or family history of bleeding or bruising disorders. No new medications or recent infections. Platelet trend from admission 30->26->24.  PLASMIC score for TTP in the intermediate range. However, LDH and haptoglobin are normal and peripheral smear review by fellow 23 does not show schistocytes. Giant platelets are present . No PE noted on CT Angio PE protocol, however, elevated troponin No renal dysfunction. Coagulation studies on admission WNL.    Differential includes idiopathic thrombocytopenia (giant platelets), less likely to be microangiopathic process such as TTP or HUS (LDH and Hapto WNL, no schistocytes, quick return to baseline s/p head trauma, relatively stable platelet count), unlikely DIC (fibrinogen 349).     Plan:  - Trend daily CBC with differential  - Please check retic count, LDH, haptoglobin  - Check HIV and Hep C  - Check Folate, B12, iron studies with ferritin  - Check RONALD, RF, anti CCP  - Check Lupus anticoagulant, anticardiolipin ab, and beta 2 glycoprotein  - Would send for flow cytometry   - ITP is a diagnosis of exlcusion, will discuss role for steroids with work up above and platelet trend  - Recommend stat CT head if changes in baseline neurologic exam  - Consider neurology evaluation if cardiac work up remains negative  - Transfuse for platelet count <10, <20 with fever, or with active bleeding <50  - Maintain active type and screen    # Leukocytosis  Mildly elevated WBC with neutrophil predominance on admission. No clear infectious etiology, possibly reactive from inflammation after falling. RVP negative.  - Trend daily CBC with differential    Patient interviewed, examined and discussed with hematology oncology attending Dr. Margy Wood. Recommendations are considered final after attending attestation.

## 2023-04-07 NOTE — ED PROVIDER NOTE - PHYSICAL EXAMINATION
VITAL SIGNS: I have reviewed nursing notes and confirm.  CONSTITUTIONAL: Well-developed; well-nourished; in no acute distress.  SKIN: Skin exam is warm and dry, no acute rash.  HEAD: Normocephalic; atraumatic.  EYES: PERRL, EOM intact; conjunctiva and sclera clear.  ENT: No nasal discharge; airway clear.  NECK: Supple; non tender.  CARD: RRR  RESP: Unlabored. No wheezes, rales or rhonchi.  ABD: soft; non-distended; non-tender  EXT: Normal ROM. No cyanosis or edema. Non-ttp all ext, distal pulses intact  NEURO: Alert, oriented. Grossly unremarkable.  PSYCH: Cooperative, appropriate.

## 2023-04-07 NOTE — H&P ADULT - NSICDXFAMILYHX_GEN_ALL_CORE_FT
FAMILY HISTORY:  Aunt  Still living? Unknown  Family history of cardiomegaly, Age at diagnosis: Age Unknown

## 2023-04-07 NOTE — H&P ADULT - SKIN COMMENTS
+ scattered small petechia scattered on arms and legs; + scattered bruises throughout; + quarter sized bruising and swelling of middle of forehead

## 2023-04-07 NOTE — H&P ADULT - NS ATTEND AMEND GEN_ALL_CORE FT
See PA note written above, for details. I reviewed the PA documentation.  I have personally seen and examined this patient today. I reviewed vitals, labs, medications, cardiac studies and additional imaging.  I agree with the PA's findings and plans as written above with the following additions/amendments:  32F with FH of medication induced prolonged QT in mother, aunt  of cardiomyopathy age 50, and personal history of anxiety (on Lexapro) and daily use of OCP who presented to [facility] 23 after syncopal episode after walking up 6 flights of stairs w/ prodrome of dizziness and SOB. Patient with head strike in stairwell. EKG w/ diffuse ST depression and TWI, Troponin elevated to peak 0.32 downtrended to 0.19. CBC notable for leukocytosis, thrombocytopenia to 24K, admitted to cardiology for further work up of syncope.     Physical Exam notable for: young patient laying in stretcher in NAD, flat neck veins, RRR, no MGR detected, clear lungs, soft abdomen, no fluid wave detected, no pretibial pitting edema, no ankle edema, no rashes detected, skin WWP, A&Ox3  TTE 23: normal biV function, normal valve function, no pHTN, no pericardial effusion  Plan for:  cMRI ordered, anticipated to be done today  to evaluate potential myocarditis  EP consulted for evaluation of syncope  Alena Rosenbaum M.D.  Cardiology Attending  65minutes spent on total encounter; more than 50% of the visit was spent counseling and/or coordinating care by the attending physician, with plan of care discussed with the patient and cardiac team. See PA note written above, for details. I reviewed the PA documentation.  I have personally seen and examined this patient today. I reviewed vitals, labs, medications, cardiac studies and additional imaging.  I agree with the PA's findings and plans as written above with the following additions/amendments:  32F with FH of medication induced prolonged QT in mother, aunt  of cardiomyopathy age 50, and personal history of anxiety (on Lexapro) and daily use of OCP who presented to [facility] 23 after syncopal episode after walking up 6 flights of stairs w/ prodrome of dizziness and SOB. Patient with head strike in stairwell. EKG w/ diffuse ST depression and TWI, Troponin elevated to peak 0.32 downtrended to 0.19. CBC notable for leukocytosis, thrombocytopenia to 24K, admitted to cardiology for further work up of syncope.     Physical Exam notable for: young patient laying in stretcher in NAD, flat neck veins, RRR, no MGR detected, clear lungs, soft abdomen, no fluid wave detected, no pretibial pitting edema, no ankle edema, no rashes detected, skin WWP, A&Ox3  TTE 23: normal biV function, normal valve function, no pHTN, no pericardial effusion  Plan for:  CCTA ordered for coronary evaluation  cMRI ordered, anticipated to be done today  to evaluate potential myocarditis  EP consulted for evaluation of syncope  Hematology consulted for severe TCP with no hx of TCP  (but no recent labs for review as patient LTFU with general medicine)  Alena Rosenbaum M.D.  Cardiology Attending  65minutes spent on total encounter; more than 50% of the visit was spent counseling and/or coordinating care by the attending physician, with plan of care discussed with the patient and cardiac team.

## 2023-04-07 NOTE — ED ADULT NURSE REASSESSMENT NOTE - NS ED NURSE REASSESS COMMENT FT1
Received handoff from Milind JIMÉNEZ. Patient currently resting comfortably in bed. AAOx4. Patient in no acute distress. Care ongoing.

## 2023-04-07 NOTE — H&P ADULT - PROBLEM SELECTOR PLAN 1
Pt w/ one episode of syncope while walking up stairs w/o prodromal sx  - Trop I: 9153 with second set 88506  - D-dimer 427, CT PE Neg  - EKG: rate 76, LVH, diffuse ST depressions  - Received ASA 325mg PO x 1 @ [facility]  - Repeat cardiac enzymes ordered, TTE, Cont tele monitor Pt w/ one episode of syncope while walking up stairs w/o prodromal sx  - Trop I: 9153 with second set 67095  - D-dimer 427, CT PE Neg  - EKG: rate 76, LVH, diffuse ST depressions  - Family hx of "enlarged heart" and mother w/ QT prolongation w/ ICD  - Consider EP consult   - Received ASA 325mg PO x 1 @ [facility]  - Repeat cardiac enzymes ordered, TTE, Cont tele monitor

## 2023-04-07 NOTE — ED PROVIDER NOTE - CLINICAL SUMMARY MEDICAL DECISION MAKING FREE TEXT BOX
Patient here with syncopal episode in the setting of exertion walking up the stairs with 1 episode of emesis.   Troponemia with twave inversions on ekg  Exam unremarkable   Plan: admission to cardiac tele

## 2023-04-07 NOTE — CONSULT NOTE ADULT - SUBJECTIVE AND OBJECTIVE BOX
INCOMPLETE    31yo female, FamHx mother w/ prolonged QT (likely acquired has PPM), Aunt who  of "enlarged heart" age 50, and PMHx of anxiety (on Lexapro) and OCP use who presented to [facility] 23 after syncopal episode.  Patient states she was walking up 6 flights of stairs with her laundry when she became SOB (which she reports is common when walking up 6 flights), and dizziness prior to syncope.  She does not remember fainting but recalls waking up on the floor and noted she hit her head, and believes she had LOC for only seconds.  She had an episode of vomiting on her way to the ED.  She also reports on episode of sharp midsternal chest pain that occurred that morning, lasting a second, before self resolving.  She has had similar chest pain before which she relates to anxiety.  She denies any chest pain, palpitations, diaphoresis or feeling hot prior to syncope.  Also denies any fevers, chills, URI sx, or prior syncope.  Hospital course significant for EKG sinus rhythm normal QT interval, diffuse ST depressions.  CT Head unremarkable.  CTA chest negative for PE.    Leukocytosis (remains afebrile), thrombocytopenia, and hypomagnesemia.      [facility], BP: 112/82, HR: 100s, RR: 16, Temp: 97.9F, O2 sat: 99% RA, Fingerstick 96. EKG revealed 76bpm, LVH and diffuse ST depressions throughout. Labs notable for: WBC 14.64 with left shift,  Platelets 30k-->26k, Mag 1.3, Troponin I 9153 with second set 92977, D-dimer 427. CT head unremarkable. CTA Chest negative for PE (limited study 2/2 motion artifact). Patient treated with ASA 325mg PO x 1 dose and Mg 2g IV x 1 dose. Patient now transferred to [facility] cardiac telemetry for serial enzymes and further cardiac work-up. Pt upon arrival to [facility] pt does not appear to be in distress but is anxious, VSS, cardiology team will continue to monitor.  INCOMPLETE    33yo female, FamHx mother w/ prolonged QT (likely acquired has PPM), Aunt who  of "enlarged heart" age 50, and PMHx of anxiety (on Lexapro) and OCP use who presented to [facility] 23 after syncopal episode.  Patient states she was walking up 6 flights of stairs with her laundry when she became SOB (which she reports is common when walking up 6 flights), and dizziness prior to syncope.  She does not remember fainting but recalls waking up on the floor and noted she hit her head, and believes she had LOC for only seconds.  She had an episode of vomiting on her way to the ED.  She also reports on episode of sharp midsternal chest pain that occurred that morning, lasting a second, before self resolving.  She has had similar chest pain before which she relates to anxiety.  She denies any chest pain, palpitations, diaphoresis or feeling hot prior to syncope.  Also denies any fevers, chills, URI sx, or prior syncope.  Hospital course significant for EKG sinus rhythm normal QT interval, diffuse ST depressions.  CT Head unremarkable.  CTA chest negative for PE.  Troponin I high sensitivity 9153 ->46217 -> Trop T 0.32, CK wnl.  Leukocytosis (remains afebrile), thrombocytopenia, and hypomagnesemia.      [facility], BP: 112/82, HR: 100s, RR: 16, Temp: 97.9F, O2 sat: 99% RA, Fingerstick 96. EKG revealed 76bpm, LVH and diffuse ST depressions throughout. Labs notable for: WBC 14.64 with left shift,  Platelets 30k-->26k, Mag 1.3, Troponin I 9153 with second set 70504, D-dimer 427. CT head unremarkable. CTA Chest negative for PE (limited study 2/2 motion artifact). Patient treated with ASA 325mg PO x 1 dose and Mg 2g IV x 1 dose. Patient now transferred to [facility] cardiac telemetry for serial enzymes and further cardiac work-up. Pt upon arrival to [facility] pt does not appear to be in distress but is anxious, VSS, cardiology team will continue to monitor.  HPI:    31yo female, FamHx mother w/ prolonged QT (likely acquired has PPM), Aunt who  of "enlarged heart" age 50, and PMHx of anxiety (on Lexapro) and OCP use who presented to [facility] 23 after syncopal episode.  Patient states she was walking up 6 flights of stairs with her laundry when she became SOB (which she reports is common when walking up 6 flights), and dizziness prior to syncope.  She does not remember fainting but recalls waking up on the floor and noted she hit her head, and believes she had LOC for only seconds.  She had an episode of vomiting on her way to the ED.  She also reports on episode of sharp midsternal chest pain that occurred that morning, lasting a second, before self resolving.  She has had similar chest pain before which she relates to anxiety.  She denies any chest pain, palpitations, diaphoresis or feeling hot prior to syncope.  Also denies any fevers, chills, URI sx, or prior syncope.  Hospital course significant for EKG sinus rhythm normal QT interval, diffuse ST depressions.  CT Head unremarkable.  CTA chest negative for PE.  Troponin I high sensitivity 9153 ->32470 -> Trop T 0.32, CK wnl.  Leukocytosis (remains afebrile), thrombocytopenia, and hypomagnesemia.  TSH wnl.  Admitted to cardiology for work up for syncope.  EP consulted for evaluation of abnormal EKG/syncope.     EKG : sinus rhythm normal QT interval, diffuse ST depressions.   Tele reviewed: ~3sec sinus pause 7 AM  Echo : Normal left and right ventricular cavity size and systolic function, No significant valvular disease, No evidence of pulmonary hypertension, No pericardial effusion.      PAST MEDICAL & SURGICAL HISTORY:  ADHD  Anxiety  COVID-19      No significant past surgical history    Family history of prolonged QT (mother, likely acquired has PPM) cardiomegaly (Aunt)    Social History:no smoking, no drugs, no algohol    pertinent home medications:  - Lexapro 10mg QD    Allergies: No Known Allergies      ROS:   CONSTITUTIONAL: No fever, weight loss + fatigue  EYES: Pt denies  RESPIRATORY: No cough, wheezing, chills or hemoptysis; No Shortness of Breath  CARDIOVASCULAR: see HPI  GASTROINTESTINAL: Pt denies  NEUROLOGICAL: Pt denies  SKIN: Pt denies   PSYCHIATRIC: Pt denies  HEME/LYMPH: Pt denies    PHYSICAL:  T(C): 36.4 (23 @ 13:28), Max: 37.1 (23 @ 01:24)  HR: 70 (23 @ 11:51) (65 - 106)  BP: 116/67 (23 @ 11:51) (107/69 - 125/65)  RR: 18 (23 @ 11:51) (16 - 20)  SpO2: 98% (23 @ 11:51) (97% - 100%)      Appearance: No acute distress, well developed, ecchymosis on forhead  Cardiovascular: Normal S1 S2, No JVD, No murmurs, No edema  Respiratory: Lungs clear to auscultation	bilaterally.  No wheeze, rhonchi, rales noted  Psych: A&Ox3, normal mood/affect       LABS:                        11.5   15.01 )-----------( 24       ( 2023 06:39 )             33.2     04-    135  |  95<L>  |  8   ----------------------------<  89  4.0   |  28  |  0.56    Ca    8.6      2023 06:39  Mg     1.2     -    TPro  6.3  /  Alb  3.7  /  TBili  0.3  /  DBili  x   /  AST  35  /  ALT  22  /  AlkPhos  65  04-07    PT/INR - ( 2023 06:39 )   PT: 12.3 sec;   INR: 1.03          PTT - ( 2023 06:39 )  PTT:27.2 sec          Assessment Plan:    31yo female, FamHx mother w/ prolonged QT (likely acquired has PPM), Aunt who  of "enlarged heart" age 50, and PMHx of anxiety (on Lexapro) and OCP use who presented to [facility] 23 after syncopal episode after walking up 6 flights of stairs w/ prodrome of dizziness and SOB.  Found to have an abnormal EKG w/ diffuse ST depression elevated troponin, leukocytosis, thrombocytopenia, admitted to cardiology for further work up.   EP consulted for evaluation of abnormal EKG/syncope.     - EKG w/ a normal QT interval.  Per patient mother w/ acquired prolonged QT.  Given normal QT interval on EKG, congenital long QT syndrome is unlikely.   - EKG w/ diffuse ST depressions in the setting of leukocytosis and elevated troponin is concerning for myocarditis.  Would recommend cMRI for further evaluation.   - Tele w/ ~3sec sinus pause.  Unclear if sleeping at the time.  Continue to monitor on telemetry.    - f/u Heme consult recs for thrombycytopenia  - no indication for acute EP intervention.  We will continue to follow over the weekend.        HPI:    31yo female, FamHx mother w/ prolonged QT (likely acquired has PPM), Aunt who  of "enlarged heart" age 50, and PMHx of anxiety (on Lexapro) and OCP use who presented to [facility] 23 after syncopal episode.  Patient states she was walking up 6 flights of stairs with her laundry when she became SOB (which she reports is common when walking up 6 flights), and dizziness prior to syncope.  She does not remember fainting but recalls waking up on the floor and noted she hit her head, and believes she had LOC for only seconds.  She had an episode of vomiting on her way to the ED.  She also reports on episode of sharp midsternal chest pain that occurred that morning, lasting a second, before self resolving.  She has had similar chest pain before which she relates to anxiety.  She denies any chest pain, palpitations, diaphoresis or feeling hot prior to syncope.  Also denies any fevers, chills, URI sx, or prior syncope.  Hospital course significant for EKG sinus rhythm, QTc 386msec, diffuse ST depressions.  CT Head unremarkable.  CTA chest negative for PE.  Troponin I high sensitivity 9153 ->39175 -> Trop T 0.32, CK wnl.  Leukocytosis (remains afebrile), thrombocytopenia, and hypomagnesemia.  TSH wnl.  Admitted to cardiology for work up for syncope.  EP consulted for evaluation of abnormal EKG/syncope.     EKG : sinus rhythm, QTc 386msec, diffuse ST depressions.   Tele reviewed: ~3sec sinus pause  AM  Echo : Normal left and right ventricular cavity size and systolic function, No significant valvular disease, No evidence of pulmonary hypertension, No pericardial effusion.      PAST MEDICAL & SURGICAL HISTORY:  ADHD  Anxiety  COVID-19      No significant past surgical history    Family history of prolonged QT (mother, likely acquired has PPM) cardiomegaly (Aunt)    Social History:no smoking, no drugs, no algohol    pertinent home medications:  - Lexapro 10mg QD    Allergies: No Known Allergies      ROS:   CONSTITUTIONAL: No fever, weight loss + fatigue  EYES: Pt denies  RESPIRATORY: No cough, wheezing, chills or hemoptysis; No Shortness of Breath  CARDIOVASCULAR: see HPI  GASTROINTESTINAL: Pt denies  NEUROLOGICAL: Pt denies  SKIN: Pt denies   PSYCHIATRIC: Pt denies  HEME/LYMPH: Pt denies    PHYSICAL:  T(C): 36.4 (23 @ 13:28), Max: 37.1 (23 @ 01:24)  HR: 70 (23 @ 11:51) (65 - 106)  BP: 116/67 (23 @ 11:51) (107/69 - 125/65)  RR: 18 (23 @ 11:51) (16 - 20)  SpO2: 98% (23 @ 11:51) (97% - 100%)      Appearance: No acute distress, well developed, ecchymosis on forhead  Cardiovascular: Normal S1 S2, No JVD, No murmurs, No edema  Respiratory: Lungs clear to auscultation	bilaterally.  No wheeze, rhonchi, rales noted  Psych: A&Ox3, normal mood/affect       LABS:                        11.5   15.01 )-----------( 24       ( 2023 06:39 )             33.2     04-    135  |  95<L>  |  8   ----------------------------<  89  4.0   |  28  |  0.56    Ca    8.6      2023 06:39  Mg     1.2     -    TPro  6.3  /  Alb  3.7  /  TBili  0.3  /  DBili  x   /  AST  35  /  ALT  22  /  AlkPhos  65  04-07    PT/INR - ( 2023 06:39 )   PT: 12.3 sec;   INR: 1.03          PTT - ( 2023 06:39 )  PTT:27.2 sec          Assessment Plan:    31yo female, FamHx mother w/ prolonged QT (likely acquired has PPM), Aunt who  of "enlarged heart" age 50, and PMHx of anxiety (on Lexapro) and OCP use who presented to [facility] 23 after syncopal episode after walking up 6 flights of stairs w/ prodrome of dizziness and SOB.  Found to have an abnormal EKG w/ diffuse ST depression elevated troponin, leukocytosis, thrombocytopenia, admitted to cardiology for further work up.   EP consulted for evaluation of abnormal EKG/syncope.     - baseline ECG NSR @ 69bpm, borderline 1st AV block, QTc 386msec, diffuse ST depressions  - Telemetry, NSR, ~3sec sinus pause 4/7AM, no other significant arrhythmias noted.  Continue telemetry monitoring.   - consider cMRI to r/o myocarditis or any other cardiomyopathy  - f/u Heme consult recs for thrombocytopenia  - The etiology of syncope is unclear  - no indication for acute EP intervention at this time. We will continue to follow over the weekend.

## 2023-04-07 NOTE — ED PROVIDER NOTE - PROGRESS NOTE DETAILS
Attempted Cardiology Tele admission. Advised repeat platelet count prior to placing admission. Advised decision for heparin after repeat platelet count.

## 2023-04-07 NOTE — H&P ADULT - PROBLEM SELECTOR PLAN 4
Continue Ming 10mg daily     F: None  E: Replete if K<4 or Mag<2  N: DASH Diet  VTEppx: Ambulatory   Dispo: Pending clincal progression

## 2023-04-07 NOTE — PATIENT PROFILE ADULT - FALL HARM RISK - HARM RISK INTERVENTIONS

## 2023-04-08 ENCOUNTER — TRANSCRIPTION ENCOUNTER (OUTPATIENT)
Age: 33
End: 2023-04-08

## 2023-04-08 DIAGNOSIS — D69.3 IMMUNE THROMBOCYTOPENIC PURPURA: ICD-10-CM

## 2023-04-08 LAB
ALBUMIN SERPL ELPH-MCNC: 3.8 G/DL — SIGNIFICANT CHANGE UP (ref 3.3–5)
ALBUMIN SERPL ELPH-MCNC: 4 G/DL — SIGNIFICANT CHANGE UP (ref 3.3–5)
ALP SERPL-CCNC: 71 U/L — SIGNIFICANT CHANGE UP (ref 40–120)
ALP SERPL-CCNC: 76 U/L — SIGNIFICANT CHANGE UP (ref 40–120)
ALT FLD-CCNC: 22 U/L — SIGNIFICANT CHANGE UP (ref 10–45)
ALT FLD-CCNC: 24 U/L — SIGNIFICANT CHANGE UP (ref 10–45)
ANION GAP SERPL CALC-SCNC: 10 MMOL/L — SIGNIFICANT CHANGE UP (ref 5–17)
ANION GAP SERPL CALC-SCNC: 12 MMOL/L — SIGNIFICANT CHANGE UP (ref 5–17)
ANION GAP SERPL CALC-SCNC: 15 MMOL/L — SIGNIFICANT CHANGE UP (ref 5–17)
ANISOCYTOSIS BLD QL: SLIGHT — SIGNIFICANT CHANGE UP
AST SERPL-CCNC: 27 U/L — SIGNIFICANT CHANGE UP (ref 10–40)
AST SERPL-CCNC: 33 U/L — SIGNIFICANT CHANGE UP (ref 10–40)
BASOPHILS # BLD AUTO: 0.01 K/UL — SIGNIFICANT CHANGE UP (ref 0–0.2)
BASOPHILS NFR BLD AUTO: 0.1 % — SIGNIFICANT CHANGE UP (ref 0–2)
BASOPHILS NFR BLD AUTO: 1 % — SIGNIFICANT CHANGE UP (ref 0–2)
BILIRUB SERPL-MCNC: 0.3 MG/DL — SIGNIFICANT CHANGE UP (ref 0.2–1.2)
BILIRUB SERPL-MCNC: 0.3 MG/DL — SIGNIFICANT CHANGE UP (ref 0.2–1.2)
BUN SERPL-MCNC: 10 MG/DL — SIGNIFICANT CHANGE UP (ref 7–23)
BUN SERPL-MCNC: 6 MG/DL — LOW (ref 7–23)
BUN SERPL-MCNC: 8 MG/DL — SIGNIFICANT CHANGE UP (ref 7–23)
CALCIUM SERPL-MCNC: 8.5 MG/DL — SIGNIFICANT CHANGE UP (ref 8.4–10.5)
CALCIUM SERPL-MCNC: 8.6 MG/DL — SIGNIFICANT CHANGE UP (ref 8.4–10.5)
CALCIUM SERPL-MCNC: 8.8 MG/DL — SIGNIFICANT CHANGE UP (ref 8.4–10.5)
CHLORIDE SERPL-SCNC: 97 MMOL/L — SIGNIFICANT CHANGE UP (ref 96–108)
CHLORIDE SERPL-SCNC: 98 MMOL/L — SIGNIFICANT CHANGE UP (ref 96–108)
CHLORIDE SERPL-SCNC: 98 MMOL/L — SIGNIFICANT CHANGE UP (ref 96–108)
CK MB CFR SERPL CALC: 3.5 NG/ML — SIGNIFICANT CHANGE UP (ref 0–6.7)
CK SERPL-CCNC: 116 U/L — SIGNIFICANT CHANGE UP (ref 25–170)
CLOSURE TME COLL+EPINEP BLD: 20 K/UL — CRITICAL LOW (ref 150–400)
CO2 SERPL-SCNC: 26 MMOL/L — SIGNIFICANT CHANGE UP (ref 22–31)
CO2 SERPL-SCNC: 27 MMOL/L — SIGNIFICANT CHANGE UP (ref 22–31)
CO2 SERPL-SCNC: 28 MMOL/L — SIGNIFICANT CHANGE UP (ref 22–31)
CREAT SERPL-MCNC: 0.55 MG/DL — SIGNIFICANT CHANGE UP (ref 0.5–1.3)
CREAT SERPL-MCNC: 0.57 MG/DL — SIGNIFICANT CHANGE UP (ref 0.5–1.3)
CREAT SERPL-MCNC: 0.61 MG/DL — SIGNIFICANT CHANGE UP (ref 0.5–1.3)
EGFR: 123 ML/MIN/1.73M2 — SIGNIFICANT CHANGE UP
EGFR: 125 ML/MIN/1.73M2 — SIGNIFICANT CHANGE UP
EGFR: 126 ML/MIN/1.73M2 — SIGNIFICANT CHANGE UP
EOSINOPHIL # BLD AUTO: 0 K/UL — SIGNIFICANT CHANGE UP (ref 0–0.5)
EOSINOPHIL NFR BLD AUTO: 0 % — SIGNIFICANT CHANGE UP (ref 0–6)
GLUCOSE SERPL-MCNC: 107 MG/DL — HIGH (ref 70–99)
GLUCOSE SERPL-MCNC: 137 MG/DL — HIGH (ref 70–99)
GLUCOSE SERPL-MCNC: 148 MG/DL — HIGH (ref 70–99)
HAPTOGLOB SERPL-MCNC: 213 MG/DL — HIGH (ref 34–200)
HCT VFR BLD CALC: 35.9 % — SIGNIFICANT CHANGE UP (ref 34.5–45)
HCV AB S/CO SERPL IA: 0.06 S/CO — SIGNIFICANT CHANGE UP (ref 0–0.99)
HCV AB SERPL-IMP: SIGNIFICANT CHANGE UP
HGB BLD-MCNC: 12.3 G/DL — SIGNIFICANT CHANGE UP (ref 11.5–15.5)
HYPOCHROMIA BLD QL: SLIGHT — SIGNIFICANT CHANGE UP
IMM GRANULOCYTES NFR BLD AUTO: 2.3 % — HIGH (ref 0–0.9)
LDH SERPL L TO P-CCNC: 215 U/L — SIGNIFICANT CHANGE UP (ref 50–242)
LYMPHOCYTES # BLD AUTO: 0.77 K/UL — LOW (ref 1–3.3)
LYMPHOCYTES # BLD AUTO: 8 % — LOW (ref 13–44)
LYMPHOCYTES # BLD AUTO: 9 % — LOW (ref 13–44)
MAGNESIUM SERPL-MCNC: 2.4 MG/DL — SIGNIFICANT CHANGE UP (ref 1.6–2.6)
MAGNESIUM SERPL-MCNC: 2.5 MG/DL — SIGNIFICANT CHANGE UP (ref 1.6–2.6)
MANUAL SMEAR VERIFICATION: SIGNIFICANT CHANGE UP
MCHC RBC-ENTMCNC: 31 PG — SIGNIFICANT CHANGE UP (ref 27–34)
MCHC RBC-ENTMCNC: 34.3 GM/DL — SIGNIFICANT CHANGE UP (ref 32–36)
MCV RBC AUTO: 90.4 FL — SIGNIFICANT CHANGE UP (ref 80–100)
METAMYELOCYTES # FLD: 1 % — HIGH
MICROCYTES BLD QL: SLIGHT — SIGNIFICANT CHANGE UP
MONOCYTES # BLD AUTO: 0.12 K/UL — SIGNIFICANT CHANGE UP (ref 0–0.9)
MONOCYTES NFR BLD AUTO: 1 % — LOW (ref 2–14)
MONOCYTES NFR BLD AUTO: 1.2 % — LOW (ref 2–14)
MYELOCYTES NFR BLD: 1 % — HIGH
NEUTROPHILS # BLD AUTO: 8.55 K/UL — HIGH (ref 1.8–7.4)
NEUTROPHILS NFR BLD AUTO: 87 % — HIGH (ref 43–77)
NEUTROPHILS NFR BLD AUTO: 88.4 % — HIGH (ref 43–77)
NRBC # BLD: 0 /100 WBCS — SIGNIFICANT CHANGE UP (ref 0–0)
PLAT MORPH BLD: ABNORMAL
PLATELET # BLD AUTO: 22 K/UL — LOW (ref 150–400)
POTASSIUM SERPL-MCNC: 4.3 MMOL/L — SIGNIFICANT CHANGE UP (ref 3.5–5.3)
POTASSIUM SERPL-MCNC: 4.5 MMOL/L — SIGNIFICANT CHANGE UP (ref 3.5–5.3)
POTASSIUM SERPL-MCNC: 5.1 MMOL/L — SIGNIFICANT CHANGE UP (ref 3.5–5.3)
POTASSIUM SERPL-SCNC: 4.3 MMOL/L — SIGNIFICANT CHANGE UP (ref 3.5–5.3)
POTASSIUM SERPL-SCNC: 4.5 MMOL/L — SIGNIFICANT CHANGE UP (ref 3.5–5.3)
POTASSIUM SERPL-SCNC: 5.1 MMOL/L — SIGNIFICANT CHANGE UP (ref 3.5–5.3)
PROT SERPL-MCNC: 6.2 G/DL — SIGNIFICANT CHANGE UP (ref 6–8.3)
PROT SERPL-MCNC: 6.7 G/DL — SIGNIFICANT CHANGE UP (ref 6–8.3)
RBC # BLD: 3.97 M/UL — SIGNIFICANT CHANGE UP (ref 3.8–5.2)
RBC # FLD: 12.3 % — SIGNIFICANT CHANGE UP (ref 10.3–14.5)
RBC BLD AUTO: ABNORMAL
SODIUM SERPL-SCNC: 136 MMOL/L — SIGNIFICANT CHANGE UP (ref 135–145)
SODIUM SERPL-SCNC: 136 MMOL/L — SIGNIFICANT CHANGE UP (ref 135–145)
SODIUM SERPL-SCNC: 139 MMOL/L — SIGNIFICANT CHANGE UP (ref 135–145)
TROPONIN T SERPL-MCNC: 0.12 NG/ML — CRITICAL HIGH (ref 0–0.01)
WBC # BLD: 9.67 K/UL — SIGNIFICANT CHANGE UP (ref 3.8–10.5)
WBC # FLD AUTO: 9.67 K/UL — SIGNIFICANT CHANGE UP (ref 3.8–10.5)

## 2023-04-08 PROCEDURE — 99232 SBSQ HOSP IP/OBS MODERATE 35: CPT

## 2023-04-08 PROCEDURE — 76700 US EXAM ABDOM COMPLETE: CPT | Mod: 26

## 2023-04-08 PROCEDURE — 99231 SBSQ HOSP IP/OBS SF/LOW 25: CPT

## 2023-04-08 PROCEDURE — 99233 SBSQ HOSP IP/OBS HIGH 50: CPT

## 2023-04-08 RX ORDER — DEXAMETHASONE 0.5 MG/5ML
40 ELIXIR ORAL DAILY
Refills: 0 | Status: DISCONTINUED | OUTPATIENT
Start: 2023-04-08 | End: 2023-04-08

## 2023-04-08 RX ORDER — ESCITALOPRAM OXALATE 10 MG/1
10 TABLET, FILM COATED ORAL DAILY
Refills: 0 | Status: DISCONTINUED | OUTPATIENT
Start: 2023-04-08 | End: 2023-04-10

## 2023-04-08 RX ORDER — DEXAMETHASONE 0.5 MG/5ML
40 ELIXIR ORAL DAILY
Refills: 0 | Status: DISCONTINUED | OUTPATIENT
Start: 2023-04-08 | End: 2023-04-10

## 2023-04-08 RX ADMIN — ESCITALOPRAM OXALATE 10 MILLIGRAM(S): 10 TABLET, FILM COATED ORAL at 12:58

## 2023-04-08 RX ADMIN — Medication 120 MILLIGRAM(S): at 22:34

## 2023-04-08 NOTE — PROGRESS NOTE ADULT - PROBLEM SELECTOR PLAN 3
Pt presented with PLT of 30 trending to 24  - No h/o of thrombocytopenia f/u platelet count   - Heme/Onc consulted - f/u recs Problem/Plan - 3:  ·  Problem: Leukocytosis.   ·  Plan: Pt presented with WBC of 13.08 trending to15.01  - WBC downtrended to 9.67 4/8/23  - RVP negative, UA negative   - Unclear if reactive leukocytosis Problem/Plan - 3:  ·  Problem: Leukocytosis.   ·  Plan: Pt presented with WBC of 13.08 trending to15.01  - WBC downtrended to 9.67 4/8/23  - RVP negative, UA negative   - Likely reactive leukocytosis

## 2023-04-08 NOTE — PROGRESS NOTE ADULT - ASSESSMENT
33yo female with hx of OCP use and PMH of anxiety (on Lexapro) who initially presented to [facility] 4/6/23 s/p syncopal episode. Pt states that before the syncopal event she was walking up 6 flights of stairs with her laundry and felt SOB but reported that was normal for her. Admits to head trauma and episodes of vomiting on her way to the ED following the event. At [facility] pt w/ trop I 20199, EKG with LVH and diffuse ST depressions. Pt also w/ thrombocytopenia and leukocytosis. Pt transferred to [facility] for further management of syncope.       33yo female with hx of OCP use and PMH of anxiety (on Lexapro) who initially presented to [facility] 4/6/23 s/p syncopal episode. Pt states that before the syncopal event she was walking up 6 flights of stairs with her laundry and felt SOB but reported that was normal for her. Admits to head trauma associated w/ nausea.   At [facility] pt w/ trop I 47762, EKG with LVH and diffuse ST depressions. CTH negative, CTA negative for PE. Pt also w/ thrombocytopenia and leukocytosis. Pt now admitted to cardiac telemetry for syncope workup-  TTE and CCTA have been unremarkable for cardiac disease, pending MRI read. Heme/onc following along for thrombocytopenia.      31yo female with hx of OCP use and PMH of anxiety (on Lexapro) who initially presented to [facility] 4/6/23 s/p syncopal episode. Pt states that before the syncopal event she was walking up 6 flights of stairs with her laundry and felt SOB but reported that was normal for her. Admits to head trauma associated w/ nausea.   At [facility] pt w/ trop I [id number], EKG with LVH and diffuse ST depressions. CTH negative, CTA negative for PE. Pt also w/ thrombocytopenia and leukocytosis. Pt now admitted to cardiac telemetry for syncope workup-  TTE and CCTA have been unremarkable for cardiac disease, pending MRI read. EP following along for syncope workup and  heme/onc following along for thrombocytopenia.      33yo female with hx of OCP use and PMH of anxiety (on Lexapro) who initially presented to [facility] 4/6/23 s/p syncopal episode. Pt states that before the syncopal event she was walking up 6 flights of stairs with her laundry and felt SOB but reported that was normal for her. Admits to head trauma associated w/ nausea.   At [facility] pt w/ trop I 30559, EKG with LVH and diffuse ST depressions. CTH negative, CTA negative for PE. Pt also w/ thrombocytopenia and leukocytosis. Pt now admitted to cardiac telemetry for syncope workup-  TTE (4/7/23) with normal wall motion/valular function, normal LVEF. CCTA with normal cardiac anatomy. cardiac MRI pending to rule out mycoarditis. Heme/onc following along for thrombocytopenia.      33yo female with hx of OCP use and PMH of anxiety (on Lexapro) who initially presented to [facility] 4/6/23 s/p syncopal episode. Pt states that before the syncopal event she was walking up 6 flights of stairs with her laundry and felt SOB but reported that was normal for her. Admits to head trauma associated w/ nausea.   At [facility] pt w/ trop I 30944, EKG with LVH and diffuse ST depressions. CTH negative, CTA negative for PE. Pt also w/ thrombocytopenia and leukocytosis. Pt now admitted to cardiac telemetry for syncope workup-  TTE (4/7/23) with normal wall motion/valular function, normal LVEF. CCTA with normal cardiac anatomy. Cardiac MRI pending to rule out mycoarditis. Heme/onc following along for thrombocytopenia.      33yo female with hx of OCP use and PMH of anxiety (on Lexapro) who initially presented to [facility] 4/6/23 s/p syncopal episode. Pt states that before the syncopal event she was walking up 6 flights of stairs with her laundry and felt SOB but reported that was normal for her. Admits to head trauma associated w/ nausea.   At [facility] pt w/ trop I 20099, EKG with LVH and diffuse ST depressions. CTH negative, CTA negative for PE. Pt also w/ thrombocytopenia and leukocytosis. Pt now admitted to cardiac telemetry for syncope workup-  TTE (4/7/23) with normal wall motion/valular function, normal LVEF. CCTA with normal cardiac anatomy. Troponins downtrended from 0.32 --> 0.12. Cardiac MRI pending to rule out myocarditis.  Heme/onc following along for work up of thrombocytopenia.

## 2023-04-08 NOTE — PROGRESS NOTE ADULT - SUBJECTIVE AND OBJECTIVE BOX
She feels well, lying in bed.  Troponin trending down, in the setting of marked ST and T wave changes without any chest pain syndrome.  CT showed normal coronary anatomy.  Awaiting MRI results as myocarditis still in the running for diagnosis given the high troponin and abnormal ECG in the absence of any coronary disease and normal echocardiogram.  If MRI abnormal, would require further evaluation re: acute myocarditis.  If MRI normal, no good explanation for abnormal eCG and troponin.  Would consider exercise stress test for arrhythmias as syncope occurred at top of six flight walkup while carrying a heavy load. Would also consider external, then internal loop recorder if all else negative and cardiac etiology suspected.  Would also consider further neuro workup if no cardiac etiology is clear.    As an aside, all chart notes concerning her mother's device are copy/pasted incorrectly. Her mother has an ICD not a pacemaker, which is the appropriate choice for long Qt, if any device is needed at all.

## 2023-04-08 NOTE — PROGRESS NOTE ADULT - SUBJECTIVE AND OBJECTIVE BOX
Hematology Oncology Progress Note      HPI:  31yo female, with family hx of QT prolongation (mother w/ icd) and aunt with who  of "enlarged heart" and PMHx of  anxiety (on Lexapro) and OCP use,  who initially presented to [facility] 23 s/p syncopal episode.. Pt states that before the syncopal event she was walking up 6 flights of stairs with her laundry and felt SOB but reported that was normal for her. Admits to head trauma and episodes of vomiting on her way to the ED following the event. States that this has never happened to her before and denies any cardiac hx.  Patient reports hx of easy bruising and rash when exposed to cold over the past year. Patient also notices her appetite has not been the same since she had COVID in . Patient denies any preceding symptoms of nausea, diaphoresis, palpitations, dizziness, chest pain, SOB, recent travel or sick contacts. In [facility], BP: 112/82, HR: 100s, RR: 16, Temp: 97.9F, O2 sat: 99% RA, Fingerstick 96. EKG revealed 76bpm, LVH and diffuse ST depressions throughout. Labs notable for: WBC 14.64 with left shift,  Platelets 30k-->26k, Mag 1.3, Troponin I 9153 with second set 82782, D-dimer 427. CT head unremarkable. CTA Chest negative for PE (limited study 2/2 motion artifact). Patient treated with ASA 325mg PO x 1 dose and Mg 2g IV x 1 dose. Patient now transferred to [facility] cardiac telemetry for serial enzymes and further cardiac work-up. Pt upon arrival to [facility] pt does not appear to be in distress but is anxious, VSS, cardiology team will continue to monitor.    (2023 07:32)      SUBJECTIVE: Patient seen and examined at bedside. Denies fever, headache, nausea, vomiting, chest pain, shortness of breath, abdominal pain, changes in bowel movements or urination.     OBJECTIVE:    VITAL SIGNS:  ICU Vital Signs Last 24 Hrs  T(C): 36.9 (2023 13:09), Max: 36.9 (2023 17:37)  T(F): 98.5 (2023 13:09), Max: 98.5 (2023 17:37)  HR: 73 (2023 08:31) (52 - 80)  BP: 111/59 (2023 08:31) (93/50 - 127/72)  BP(mean): 65 (2023 06:14) (65 - 91)  ABP: --  ABP(mean): --  RR: 17 (2023 08:31) (17 - 18)  SpO2: 95% (2023 08:31) (95% - 98%)    O2 Parameters below as of 2023 08:31  Patient On (Oxygen Delivery Method): room air               @ 07:01  -  08 @ 07:00  --------------------------------------------------------  IN: 120 mL / OUT: 0 mL / NET: 120 mL     @ :  -  08 @ 15:39  --------------------------------------------------------  IN: 0 mL / OUT: 0 mL / NET: 0 mL      CAPILLARY BLOOD GLUCOSE      POCT Blood Glucose.: 96 mg/dL (2023 23:04)      PHYSICAL EXAM:  General: NAD, answering questions, pleasantly conversant  HEENT: NC, large soft tissue swelling and bruise on forehead; PERRL, clear conjunctiva, no oral petechiae  Neck: supple  Respiratory: CTA b/l  Cardiovascular: +S1/S2; RRR  Abdomen: soft, NT/ND; +BS x4  Extremities: WWP, 2+ peripheral pulses b/l; no LE edema  Skin: normal color and turgor; few faint scattered areas of petechiae on arms and legs, improved  Neurological: AAOx3    MEDICATIONS:  MEDICATIONS  (STANDING):  dexAMETHasone  IVPB 40 milliGRAM(s) IV Intermittent daily  escitalopram 10 milliGRAM(s) Oral daily    MEDICATIONS  (PRN):      ALLERGIES:  Allergies    No Known Allergies    Intolerances        LABS:                        12.3   9.67  )-----------( 22       ( 2023 06:02 )             35.9     04-08    136  |  98  |  10  ----------------------------<  137<H>  4.3   |  26  |  0.55    Ca    8.5      2023 10:00  Mg     2.4     04-08    TPro  6.2  /  Alb  3.8  /  TBili  0.3  /  DBili  x   /  AST  27  /  ALT  22  /  AlkPhos  71  04-08    PT/INR - ( 2023 06:39 )   PT: 12.3 sec;   INR: 1.03          PTT - ( 2023 06:39 )  PTT:27.2 sec  Urinalysis Basic - ( 2023 18:25 )    Color: Yellow / Appearance: Clear / S.015 / pH: x  Gluc: x / Ketone: 15 mg/dL  / Bili: Negative / Urobili: 0.2 E.U./dL   Blood: x / Protein: NEGATIVE mg/dL / Nitrite: NEGATIVE   Leuk Esterase: NEGATIVE / RBC: x / WBC x   Sq Epi: x / Non Sq Epi: x / Bacteria: x                  RADIOLOGY & ADDITIONAL TESTS: Reviewed.

## 2023-04-08 NOTE — DISCHARGE NOTE PROVIDER - NSDCCPCAREPLAN_GEN_ALL_CORE_FT
PRINCIPAL DISCHARGE DIAGNOSIS  Diagnosis: Syncope  Assessment and Plan of Treatment: You were admitted to the hospital because you lost consciousness. The CT Scan of your brain was without acute bleeding. Your heart enzymes have normalized. Your Echocardiogram (heart Ultrasound) showed normal wall motion and normal valvue functions. Your CT scan of the heart showed normal heart anatomy.        PRINCIPAL DISCHARGE DIAGNOSIS  Diagnosis: Syncope  Assessment and Plan of Treatment:        PRINCIPAL DISCHARGE DIAGNOSIS  Diagnosis: Syncope  Assessment and Plan of Treatment: You were admitted to the hospital because you lost consciousness. The CT Scan of your brain was without acute bleeding. Your heart enzymes (markers of heart injury in the blood)  Your Echocardiogram (heart Ultrasound) showed normal wall motion and normal function. You additionally had a Cardiac MRI performed showing____. You lastly had an Exercise Stress to see if there was any abnormal rhythmns noted and it showed __. You were seen by the EP team and they reccommended that you ____.      SECONDARY DISCHARGE DIAGNOSES  Diagnosis: Anxiety  Assessment and Plan of Treatment:     Diagnosis: Leukocytosis  Assessment and Plan of Treatment: You had an elevated white blood cell count this admission that most likely was due to an inflammatory response from the body    Diagnosis: Thrombocytopenia  Assessment and Plan of Treatment: Your platelets were  found to be low during this admission. Platelets are cells are found in your blood that help with clotting. We sent out some tests to evualuate for any known several lab test and it has been determined that you   ? ITP vs TTP       PRINCIPAL DISCHARGE DIAGNOSIS  Diagnosis: Syncope  Assessment and Plan of Treatment: You were admitted to the hospital because you lost consciousness. The CT Scan of your brain was without acute bleeding. Your heart enzymes (markers of heart injury in the blood were noted to be elevated however you underwent an extensive cardiology workup. Your Echocardiogram (heart Ultrasound) showed normal wall motion and normal function. You additionally had a Cardiac MRI which was negative myocarditis. You lastly had an Exercise Stress to see if there was any abnormal rhythmns noted and it did not show any abnormal rhythmns . You were seen by the EP team and they reccommended that you follow up with them in 2 weeks. You will get a patch send to you by mail which you can wear and will monitor any arrythmias which then will be looked at by Dr. Landa on your follow up. Please call them if any concerns      SECONDARY DISCHARGE DIAGNOSES  Diagnosis: Anxiety  Assessment and Plan of Treatment: Continue your home lexapro as follow up with your Doctor who helps to manage your anxiety    Diagnosis: Leukocytosis  Assessment and Plan of Treatment: You had an elevated white blood cell count this admission that most likely was due to an inflammatory response from the body. Continue to monitor yourself for any symptoms of fever, chillls, infection and you will have your bloodwork checked in your necy visit    Diagnosis: Thrombocytopenia  Assessment and Plan of Treatment: Your platelets were found to be abnormally low during this admission which the Heme (blood) team was consulted for their expertise. Platelets are cells are found in your blood that help with clotting. We sent out some tests to evualuate. You recieved IV steroids and your plateletes began to improve. You have a follow up with Dr. Anderson on 4/19/2023 at 3pm for follow up and to recheck your bloodwork.     PRINCIPAL DISCHARGE DIAGNOSIS  Diagnosis: Syncope  Assessment and Plan of Treatment: You were admitted to the hospital because you lost consciousness. The CT Scan of your brain was without acute bleeding. Your heart enzymes (markers of heart injury in the blood were noted to be elevated however you underwent an extensive cardiology workup. Your Echocardiogram (heart Ultrasound) showed normal wall motion and normal function. You additionally had a Cardiac MRI which was negative myocarditis. You lastly had an Exercise Stress to see if there was any abnormal rhythmns noted and it did not show any abnormal rhythmns . You were seen by the EP team and they recommended that you follow up with them in 2 weeks. You will get a patch send to you by mail which you can wear and will monitor any arrythmias which then will be looked at by Dr. Landa on your follow up. Please call them if any concerns [phone]      SECONDARY DISCHARGE DIAGNOSES  Diagnosis: Anxiety  Assessment and Plan of Treatment: Continue your home lexapro as follow up with your Doctor who helps to manage your anxiety    Diagnosis: Leukocytosis  Assessment and Plan of Treatment: You had an elevated white blood cell count this admission that most likely was due to an inflammatory response from the body. Continue to monitor yourself for any symptoms of fever, chillls, infection and you will have your bloodwork checked in your necy visit    Diagnosis: Thrombocytopenia  Assessment and Plan of Treatment: Your platelets were found to be abnormally low during this admission which the Heme (blood) team was consulted for their expertise. Platelets are cells are found in your blood that help with clotting. We sent out some tests to evualuate. You recieved IV steroids and your plateletes began to improve. You have a follow up with Dr. Mathew on 4/19/2023 at 3pm for follow up and to recheck your bloodwork.

## 2023-04-08 NOTE — DISCHARGE NOTE PROVIDER - CARE PROVIDER_API CALL
Angelique Mathew)  Hematology; Internal Medicine; Medical Oncology  [address] [place] [address]  Phone: [phone]  Scheduled Appointment: 04/19/2023 03:00 PM    Jordon Landa)  Cardiac Electrophysiology; Cardiovascular Disease; Internal Medicine  [address] [address]  Phone: [phone]  Fax: [phone]  Follow Up Time: 2 weeks

## 2023-04-08 NOTE — DISCHARGE NOTE PROVIDER - HOSPITAL COURSE
33yo female with hx of OCP use and PMH of anxiety (on Lexapro) who initially presented to [facility] 4/6/23 s/p syncopal episode. Pt states that before the syncopal event she was walking up 6 flights of stairs with her laundry and felt SOB but reported that was normal for her. Admits to head trauma associated w/ nausea.   At [facility] pt w/ trop I 82170, EKG with LVH and diffuse ST depressions. CTH negative, CTA negative for PE. Pt also w/ thrombocytopenia and leukocytosis. Pt now admitted to cardiac telemetry for syncope workup-  TTE (4/7/23) with normal wall motion/valular function, normal LVEF. CCTA with normal cardiac anatomy. Troponin downtrended from 0.32 --> 0.12 Cardiac MRI pending to rule out mycoarditis.     EP recommended stress test -->     Heme/onc following along for thrombocytopenia. ***INCOMPLETE***    32F w/ FHx prolonged QTC (s/p PPM), and  PMHx of Anxiety, initially presented to [facility] after syncopal episode and found to have elevated Trop w/ diffuse STD on EKG. Pt transferred to [facility] Cardiac tele for further management and now s/p normal TTE and CCTA, pending cMRI results to r/o myocarditis. Hospital course significant for thrombocytopenia, heme/onc following.      Problem/Plan - 1   ·  Problem: Syncope.   ·  Plan: presents after LOC w/ head trauma when walking up 6 flight of stairs w/ associated SOB, pt currently asymptomatic and HD stable  -Trop I 9153 > 41034, Trop T 0.32>0.19>0.12  -EKG: SR w/ diffuse STD  -D-dimer 427, CTA PE negative  -CTH negative  -TTE 4/7: LVEF 65%, no valvular disease  -CCTA 4/7: Ca score 0, normal coronaries  -f/u cMRI results to r/o Myocarditis  -NPO after MN for exercise stress to r/o arrhythmia  -EP consulted for cardiac monitor.    Problem/Plan - 2   ·  Problem: Thrombocytopenia.   ·  Plan: presents w/ PLT 30 and petechia on body, likely idiopathic thrombocytopenia (giant platelets), Heme/Onc following  -PLT remain stable, today 25  -LDH and Hapto WNL, no schistocytes, fibrinogen 349, folate and B12 WNL  -Rheum labs sent  -Abd US negative for splenomegaly   -Continue Dexamethasone 40 mg IVP x 4 days (last dose 4/10)  -Maintain active T&S (next 4/10)  -Goal PLT >50 prior to discharge.    Problem/Plan - 3   ·  Problem: Leukocytosis.   ·  Plan: presents w/ WBC 13.08, likely reactive 2/2 LOC  -WBC has remained stable, pt afebrile and HD stable  -RVP negative  -UA negative  -Abd US unremarkable  -Continue to monitor.   ***INCOMPLETE***    32F w/ FHx prolonged QTC (s/p PPM), and PMHx of Anxiety, initially presented to [facility] 4/7 after syncopal episode. Pt reports climbing 6 flights of stairs while carrying laundry and felt SOB, followed by LOC and striking head.  In ED, VSS, Labs significant for Trop I 9153 > 64651, Ddimer 427, WBC 14.64 w/ left shift and PLT 30K. EKG SR w/ LVH and diffuse STD. CTH unremarkable and CTA chest negative for PE. Pt loaded w/ ASA 325mg and transferred to [facility] cardiac telemetry for further syncope w/u.  During hospitalization, pt remained asymptomatic and HD stable. Repeat Trop T 0.32 > 0.19, EKG remained unchanged and no events on telemetry. TTE revealed normal LVEF w/o valvular disease and CCTA revealed Ca score 0 and normal coronaries. Pt s/p cMRI to r/o myocarditis revealing ________. Pt also s/p exercise stress to r/o arrhythmia revealing _______. EP consulted and recommending ___.  Hospital course significant for Thrombocytopenia, PLT ~ ___ throughout hospitalization. Heme/Onc consulted and likely idiopathic thrombocytopenia (giant platelets) given normal LDH and haptoglobin. Rheum w/u sent, ____. Abd US negative for splenomegaly. Pt started on Dexamethasone 40mg QD x 4 days. PLT prior to d/c ____.    Pt seen and examined at bedside this AM without any complaints or events overnight, VSS, labs and telemetry reviewed and pt stable for discharge as discussed with  ___. Pt has received appropriate discharge instructions, including medication regimen, and follow up with  __ in 1-2 weeks.    Discharge medications: ***INCOMPLETE***    32F w/ FHx prolonged QTC (s/p PPM), and PMHx of Anxiety, initially presented to [facility] 4/7 after syncopal episode. Pt reports climbing 6 flights of stairs while carrying laundry and felt SOB, followed by LOC and striking head. In ED, VSS, Labs significant for Trop I 9153 > 74704, Ddimer 427, WBC 14.64 w/ left shift and PLT 30K. EKG SR w/ LVH and diffuse STD. CTH unremarkable and CTA chest negative for PE. Pt loaded w/ ASA 325mg and transferred to [facility] cardiac telemetry for further syncope w/u.    During hospitalization, pt remained asymptomatic and HD stable. Repeat Trop T 0.32 > 0.19, EKG remained unchanged and no events on telemetry. TTE revealed normal LVEF w/o valvular disease and CCTA revealed Ca score 0 and normal coronaries. Pt s/p cMRI to r/o myocarditis revealing ________. Pt also s/p exercise stress to r/o arrhythmia revealing _______. EP consulted and recommending ___.  Hospital course significant for Thrombocytopenia, PLT ~ ___ throughout hospitalization. Heme/Onc consulted and likely idiopathic thrombocytopenia (giant platelets) given normal LDH and haptoglobin. Rheum w/u sent, ____. Abd US negative for splenomegaly. Pt started on Dexamethasone 40mg QD x 4 days. PLT prior to d/c ____.    Pt seen and examined at bedside this AM without any complaints or events overnight, VSS, labs and telemetry reviewed and pt stable for discharge as discussed with  ___. Pt has received appropriate discharge instructions, including medication regimen, and follow up with  __ in 1-2 weeks.    Discharge medications: ***INCOMPLETE***    32F w/ FHx prolonged QTC (s/p PPM), and PMHx of Anxiety, initially presented to [facility] 4/7 after syncopal episode. Pt reports climbing 6 flights of stairs while carrying laundry and felt SOB, followed by LOC and striking head. In ED, VSS, Labs significant for Trop I 9153 > 35647, Ddimer 427, WBC 14.64 w/ left shift and PLT 30K. EKG SR w/ LVH and diffuse STD. CTH unremarkable and CTA chest negative for PE. Pt loaded w/ ASA 325mg and transferred to [facility] cardiac telemetry for further syncope w/u.    During hospitalization, pt remained asymptomatic and HD stable. Repeat Trop T 0.32 > 0.19, EKG remained unchanged and no events on telemetry. TTE revealed normal LVEF w/o valvular disease and CCTA revealed Ca score 0 and normal coronaries. Pt s/p cMRI to r/o myocarditis revealing ________. Pt also s/p exercise stress to r/o arrhythmia revealing _______. EP consulted and recommending ___.  Hospital course significant for Thrombocytopenia,  Heme/Onc consulted believes it was immune mediated in nature PLT ~ 30-> 26-> 24-> 21-> 22-> 25-> 99 on 4/10 patient was given 4 days of steroid treatmentand likely idiopathic thrombocytopenia (giant platelets) given normal LDH and haptoglobin. Rheum w/u sent, Abd US negative for splenomegaly. Pt started on Dexamethasone 40mg QD x 4 days. PLT prior to d/c 99. Patient will follow up with Dr. Anderson upon discharge for a repeat CBC     Patient had Exercise Stress Test to look for arrhthymias which showed _____     Pt seen and examined at bedside this AM without any complaints or events overnight, VSS, labs and telemetry reviewed and pt stable for discharge as discussed with  ___. Pt has received appropriate discharge instructions, including medication regimen, and follow up with  __ in 1-2 weeks.    Discharge medications: 32F w/ FHx prolonged QTC (s/p PPM), and PMHx of Anxiety, initially presented to [facility] 4/7 after syncopal episode. Pt reports climbing 6 flights of stairs while carrying laundry and felt SOB, followed by LOC and striking head. In ED, VSS, Labs significant for Trop I 9153 > 51072, Ddimer 427, WBC 14.64 w/ left shift and PLT 30K. EKG SR w/ LVH and diffuse STD. CTH unremarkable and CTA chest negative for PE. Pt loaded w/ ASA 325mg and transferred to [facility] cardiac telemetry for further syncope w/u.    During hospitalization, pt remained asymptomatic and HD stable. Repeat Trop T 0.32 > 0.19, EKG remained unchanged and no events on telemetry. TTE revealed normal LVEF w/o valvular disease and CCTA revealed Ca score 0 and normal coronaries. Pt s/p cMRI to r/o Pt also s/p exercise stress to r/o arrhythmiaEP consulted and recommending that she have external patch worn and can follow up with   Hospital course significant for Thrombocytopenia,  Heme/Onc consulted believes it was immune mediated in nature PLT ~ 30-> 26-> 24-> 21-> 22-> 25-> 99 on 4/10 patient was given 4 days of steroid treatmentand likely idiopathic thrombocytopenia (giant platelets) given normal LDH and haptoglobin. Rheum w/u sent, Abd US negative for splenomegaly. Pt started on Dexamethasone 40mg QD x 4 days. PLT prior to d/c 99. Patient will follow up with Dr. Anderson upon discharge for a repeat CBC appointment made for 4/19 at 3pm     Cardiac MRI performed showing 4/7/2023   There is no convincing focus of increased T2 signal to suggest myocardial edema.  2.  No global or segmental wall motion abnormality.  3.  At the inferoseptal wall at the mid cavity is a subtle linear focus of   increased signal on the late gadolinium enhanced sequences. Remainder of   the late gadolinium sequences are normal.  4. There is finding of uncertain etiology (? Secondary to averaging along   the course of a conspicuous septal division and a subtle myocardial   channels along the anteroseptal wall).      Patient had Exercise Stress Test to look for arrhthymias which showed revealing PAC and PVCS post stress, no other arrythmia noted.     Pt seen and examined at bedside this AM without any complaints or events overnight, VSS, labs and telemetry reviewed and pt stable for discharge as discussed with Dr. Hoffmann. Pt has received appropriate discharge instructions, including medication regimen, and follow up with Dr. Landa in 1-2 weeks. Will be sent a external patch that she can wear and follow up with Dr. Landa in two weeks.

## 2023-04-08 NOTE — DISCHARGE NOTE PROVIDER - CARE PROVIDERS DIRECT ADDRESSES
,[email or web address],[email or web address]

## 2023-04-08 NOTE — PROGRESS NOTE ADULT - PROBLEM SELECTOR PLAN 2
Pt presented with WBC of 13.08 trending to15.01  - Continue to monitor WBC, UA, RVP Problem/Plan - 2:  ·  Problem: Thrombocytopenia.   ·  Plan: Pt presented with PLT of 30 trending to 24; + petechia on body   - Heme/Onc consulted, recs appreciated   - Continue Dexamethasone 40 mg IVP x 4 days (4/7-4/10/23)  - Low threshold to repeat CT scan of head if patient develops neurologic sx Problem/Plan - 2:  ·  Problem: Thrombocytopenia.   ·  Plan: Pt presented with PLT of 30 trending to 24; + petechia on body   - Heme/Onc consulted, recs appreciated. Follow up pending labs   - Follow up U/S of abdomen  to rule out splenectomy   - Continue Dexamethasone 40 mg IVP x 4 days (4/7-4/10/23)  - Low threshold to repeat CT scan of head if patient develops neurologic sx

## 2023-04-08 NOTE — PROGRESS NOTE ADULT - PROBLEM SELECTOR PLAN 4
Continue Ming 10mg daily     F: None  E: Replete if K<4 or Mag<2  N: DASH Diet  VTEppx: Ambulatory   Dispo: Pending clincal progression - Continue Lexapro 10mg daily   - Will confirm Adderall dosage and continue daily     F: None  E: Replete if K<4 or Mag<2  N: DASH Diet  VTEppx: Ambulatory   Dispo: Pending clinical progression - Continue Lexapro 10mg daily   - Will confirm Adderall dosage and continue daily     F: None  E: K >4, Mg >2  N: DASH Diet  VTEppx: Ambulatory   Dispo: Likely home pending clinical progression

## 2023-04-08 NOTE — PROGRESS NOTE ADULT - SUBJECTIVE AND OBJECTIVE BOX
Interventional Cardiology PA Adult Progress Note      Subjective Assessment:  Patient seen and examined at bedside, no acute events overnight.   Denies CP, SOB, HA, dizziness, palpitations, abdominal pain, nausea, vomiting, or lower extremity swelling.     ROS Negative except as per Subjective and HPI  	  MEDICATIONS:  dexAMETHasone  IVPB 40 milliGRAM(s) IV Intermittent daily  	    [PHYSICAL EXAM:  TELEMETRY:  T(C): 36.9 (04-08-23 @ 05:36), Max: 36.9 (04-07-23 @ 17:37)  HR: 52 (04-08-23 @ 06:14) (52 - 80)  BP: 93/50 (04-08-23 @ 06:14) (93/50 - 127/72)  RR: 17 (04-08-23 @ 06:14) (17 - 18)  SpO2: 95% (04-08-23 @ 06:14) (95% - 98%)  Wt(kg): --  I&O's Summary    07 Apr 2023 07:01  -  08 Apr 2023 07:00  --------------------------------------------------------  IN: 120 mL / OUT: 0 mL / NET: 120 mL        Beth:  Central/PICC/Mid Line:                                         Appearance: young female, resting comfortably, no apparent distress 	  HEENT:  PERRL  Cardiovascular: Normal S1 S2, No JVD, No murmurs,   Respiratory: Lungs clear to auscultation  Gastrointestinal:  Soft, Non-tender, + BS	  Skin: scattered   Extremities: Normal range of motion, No clubbing, cyanosis or edema  Vascular: Peripheral pulses palpable 2+ bilaterally  Neurologic: Non-focal  Psychiatry: A & O x 3, Mood & affect appropriate      	    ECG:  	  RADIOLOGY:   DIAGNOSTIC TESTING:  [ ] Echocardiogram:  [ ]  Catheterization:  [ ] Stress Test:    [ ] LUBA  OTHER: 	    LABS:	 	  CARDIAC MARKERS                12.3   9.67  )-----------( 22       ( 08 Apr 2023 06:02 )             35.9     04-08    139  |  97  |  8   ----------------------------<  148<H>  5.1   |  27  |  0.61    Ca    8.6      08 Apr 2023 06:02  Mg     2.4     04-08    TPro  6.2  /  Alb  3.8  /  TBili  0.3  /  DBili  x   /  AST  27  /  ALT  22  /  AlkPhos  71  04-08    proBNP:   Lipid Profile:   HgA1c:   TSH:   PT/INR - ( 07 Apr 2023 06:39 )   PT: 12.3 sec;   INR: 1.03          PTT - ( 07 Apr 2023 06:39 )  PTT:27.2 sec    ASSESSMENT/PLAN: 	        DVT ppx:  Dispo:     Interventional Cardiology PA Adult Progress Note      Subjective Assessment:  Patient seen and examined at bedside, no acute events overnight.   Denies CP, SOB, HA, dizziness, palpitations, abdominal pain, nausea, vomiting, or lower extremity swelling.     ROS Negative except as per Subjective and HPI  	  MEDICATIONS:  dexAMETHasone  IVPB 40 milliGRAM(s) IV Intermittent daily  	    [PHYSICAL EXAM:  TELEMETRY:  T(C): 36.9 (04-08-23 @ 05:36), Max: 36.9 (04-07-23 @ 17:37)  HR: 52 (04-08-23 @ 06:14) (52 - 80)  BP: 93/50 (04-08-23 @ 06:14) (93/50 - 127/72)  RR: 17 (04-08-23 @ 06:14) (17 - 18)  SpO2: 95% (04-08-23 @ 06:14) (95% - 98%)  Wt(kg): --  I&O's Summary    07 Apr 2023 07:01  -  08 Apr 2023 07:00  --------------------------------------------------------  IN: 120 mL / OUT: 0 mL / NET: 120 mL        Beth:  Central/PICC/Mid Line:                                         Appearance: young female, resting comfortably, no apparent distress 	  HEENT:  PERRL  Cardiovascular: Normal S1 S2, No JVD, No murmurs,   Respiratory: Lungs clear to auscultation  Gastrointestinal:  Soft, Non-tender, + BS	  Skin: scattered petechiae on bilateral arms/legs  Extremities: Normal range of motion, No clubbing, cyanosis or edema  Vascular: Peripheral pulses palpable 2+ bilaterally  Neurologic: Non-focal  Psychiatry: A & O x 3, Mood & affect appropriate      	    ECG:  	  RADIOLOGY:   DIAGNOSTIC TESTING:  [cardiac MRI]  OTHER: 	    LABS:	 	  CARDIAC MARKERS                12.3   9.67  )-----------( 22       ( 08 Apr 2023 06:02 )             35.9     04-08    139  |  97  |  8   ----------------------------<  148<H>  5.1   |  27  |  0.61    Ca    8.6      08 Apr 2023 06:02  Mg     2.4     04-08    TPro  6.2  /  Alb  3.8  /  TBili  0.3  /  DBili  x   /  AST  27  /  ALT  22  /  AlkPhos  71  04-08    proBNP:   Lipid Profile:   HgA1c:   TSH:   PT/INR - ( 07 Apr 2023 06:39 )   PT: 12.3 sec;   INR: 1.03          PTT - ( 07 Apr 2023 06:39 )  PTT:27.2 sec    ASSESSMENT/PLAN: 	        DVT ppx:  Dispo:     Interventional Cardiology PA Adult Progress Note      Subjective Assessment:  Patient seen and examined at bedside, no acute events overnight.   Denies CP, SOB, HA, dizziness, palpitations, abdominal pain, nausea, vomiting, or lower extremity swelling.     ROS Negative except as per Subjective and HPI  	  MEDICATIONS:  dexAMETHasone  IVPB 40 milliGRAM(s) IV Intermittent daily  	    [PHYSICAL EXAM:  TELEMETRY:  T(C): 36.9 (04-08-23 @ 05:36), Max: 36.9 (04-07-23 @ 17:37)  HR: 52 (04-08-23 @ 06:14) (52 - 80)  BP: 93/50 (04-08-23 @ 06:14) (93/50 - 127/72)  RR: 17 (04-08-23 @ 06:14) (17 - 18)  SpO2: 95% (04-08-23 @ 06:14) (95% - 98%)  Wt(kg): --  I&O's Summary    07 Apr 2023 07:01  -  08 Apr 2023 07:00  --------------------------------------------------------  IN: 120 mL / OUT: 0 mL / NET: 120 mL        Beth: No  Central/PICC/Mid Line: No                                        Appearance: young female, resting comfortably, no apparent distress 	  HEENT:  PERRL  Cardiovascular: Normal S1 S2, No JVD, No murmurs,   Respiratory: Lungs clear to auscultation  Gastrointestinal:  Soft, Non-tender, + BS	  Skin: scattered petechiae on bilateral arms/legs  Extremities: Normal range of motion, No clubbing, cyanosis or edema  Vascular: Peripheral pulses palpable 2+ bilaterally  Neurologic: Non-focal  Psychiatry: A & O x 3, Mood & affect appropriate      	    	  RADIOLOGY:   DIAGNOSTIC TESTING:  [cardiac MRI]  OTHER: 	    LABS:	 	  CARDIAC MARKERS                12.3   9.67  )-----------( 22       ( 08 Apr 2023 06:02 )             35.9     04-08    139  |  97  |  8   ----------------------------<  148<H>  5.1   |  27  |  0.61    Ca    8.6      08 Apr 2023 06:02  Mg     2.4     04-08    TPro  6.2  /  Alb  3.8  /  TBili  0.3  /  DBili  x   /  AST  27  /  ALT  22  /  AlkPhos  71  04-08    proBNP:   Lipid Profile:   HgA1c:   TSH:   PT/INR - ( 07 Apr 2023 06:39 )   PT: 12.3 sec;   INR: 1.03          PTT - ( 07 Apr 2023 06:39 )  PTT:27.2 sec    ASSESSMENT/PLAN: 	        DVT ppx:  Dispo:     Interventional Cardiology PA Adult Progress Note      Subjective Assessment:  Patient seen and examined at bedside, no acute events overnight.   Denies CP, SOB, HA, dizziness, palpitations, abdominal pain, nausea, vomiting, or lower extremity swelling.     ROS Negative except as per Subjective and HPI  	  MEDICATIONS:  dexAMETHasone  IVPB 40 milliGRAM(s) IV Intermittent daily  	    [PHYSICAL EXAM:  TELEMETRY:  T(C): 36.9 (04-08-23 @ 05:36), Max: 36.9 (04-07-23 @ 17:37)  HR: 52 (04-08-23 @ 06:14) (52 - 80)  BP: 93/50 (04-08-23 @ 06:14) (93/50 - 127/72)  RR: 17 (04-08-23 @ 06:14) (17 - 18)  SpO2: 95% (04-08-23 @ 06:14) (95% - 98%)  Wt(kg): --  I&O's Summary    07 Apr 2023 07:01  -  08 Apr 2023 07:00  --------------------------------------------------------  IN: 120 mL / OUT: 0 mL / NET: 120 mL        Beth: No  Central/PICC/Mid Line: No                                        Appearance: young female, resting comfortably, no apparent distress 	  HEENT:  PERRL  Cardiovascular: Normal S1 S2, No JVD, No murmurs,   Respiratory: Lungs clear to auscultation  Gastrointestinal:  Soft, Non-tender, + BS	  Skin: scattered petechiae on bilateral arms/legs  Extremities: Normal range of motion, No clubbing, cyanosis or edema  Vascular: Peripheral pulses palpable 2+ bilaterally  Neurologic: Non-focal  Psychiatry: A & O x 3, Mood & affect appropriate      	    	  RADIOLOGY:   DIAGNOSTIC TESTING:  [cardiac MRI]  OTHER: 	    LABS:	 	  CARDIAC MARKERS                12.3   9.67  )-----------( 22       ( 08 Apr 2023 06:02 )             35.9     04-08    139  |  97  |  8   ----------------------------<  148<H>  5.1   |  27  |  0.61    Ca    8.6      08 Apr 2023 06:02  Mg     2.4     04-08    TPro  6.2  /  Alb  3.8  /  TBili  0.3  /  DBili  x   /  AST  27  /  ALT  22  /  AlkPhos  71  04-08    proBNP:   Lipid Profile:   HgA1c:   TSH:   PT/INR - ( 07 Apr 2023 06:39 )   PT: 12.3 sec;   INR: 1.03          PTT - ( 07 Apr 2023 06:39 )  PTT:27.2 sec    ASSESSMENT/PLAN: 	         Interventional Cardiology PA Adult Progress Note      Subjective Assessment:  Patient seen and examined at bedside, no acute events overnight.   Denies CP, SOB, HA, dizziness, palpitations, abdominal pain, nausea, vomiting, or lower extremity swelling.     ROS Negative except as per Subjective and HPI  	  MEDICATIONS:  dexAMETHasone  IVPB 40 milliGRAM(s) IV Intermittent daily  	    [PHYSICAL EXAM:  TELEMETRY:  T(C): 36.9 (04-08-23 @ 05:36), Max: 36.9 (04-07-23 @ 17:37)  HR: 52 (04-08-23 @ 06:14) (52 - 80)  BP: 93/50 (04-08-23 @ 06:14) (93/50 - 127/72)  RR: 17 (04-08-23 @ 06:14) (17 - 18)  SpO2: 95% (04-08-23 @ 06:14) (95% - 98%)  Wt(kg): --  I&O's Summary    07 Apr 2023 07:01  -  08 Apr 2023 07:00  --------------------------------------------------------  IN: 120 mL / OUT: 0 mL / NET: 120 mL        Beth: No  Central/PICC/Mid Line: No                                        Appearance: young female, resting comfortably, no apparent distress 	  HEENT:  PERRL  Cardiovascular: Normal S1 S2, No JVD, No murmurs,   Respiratory: Lungs clear to auscultation  Gastrointestinal:  Soft, Non-tender, + BS	  Skin: scattered petechiae on bilateral arms/legs,   Extremities: Normal range of motion, No clubbing, cyanosis or edema  Vascular: Peripheral pulses palpable 2+ bilaterally  Neurologic: Non-focal  Psychiatry: A & O x 3, Mood & affect appropriate      	    	  RADIOLOGY:   DIAGNOSTIC TESTING:  [cardiac MRI]  OTHER: 	    LABS:	 	  CARDIAC MARKERS                12.3   9.67  )-----------( 22       ( 08 Apr 2023 06:02 )             35.9     04-08    139  |  97  |  8   ----------------------------<  148<H>  5.1   |  27  |  0.61    Ca    8.6      08 Apr 2023 06:02  Mg     2.4     04-08    TPro  6.2  /  Alb  3.8  /  TBili  0.3  /  DBili  x   /  AST  27  /  ALT  22  /  AlkPhos  71  04-08    proBNP:   Lipid Profile:   HgA1c:   TSH:   PT/INR - ( 07 Apr 2023 06:39 )   PT: 12.3 sec;   INR: 1.03          PTT - ( 07 Apr 2023 06:39 )  PTT:27.2 sec    ASSESSMENT/PLAN:

## 2023-04-08 NOTE — PROGRESS NOTE ADULT - PROBLEM SELECTOR PLAN 2
Plt count stable on hi dose Decadron 40 mg daily x 4 days...    Upon DC ,FU with our [facility] clinic

## 2023-04-08 NOTE — PROGRESS NOTE ADULT - ASSESSMENT
31 yo female, FamHx mother w/ prolonged QT (likely acquired has PPM), Aunt who  of "enlarged heart" age 50, and PMHx of anxiety (on Lexapro) and OCP use who presented to [facility] 23 after syncopal episode after walking up 6 flights of stairs w/ prodrome of dizziness and SOB. Hematology consulted for thrombocytopenia.    # Thrombocytopenia  No personal history of thrombocytopenia. Has not had labs checked in several years. No personal or family history of bleeding or bruising disorders. No new medications or recent infections. Platelet trend from admission 30->->24.  PLASMIC score for TTP in the intermediate range. However, LDH and haptoglobin are normal and peripheral smear review by fellow 23 does not show schistocytes. Giant platelets are present . No PE noted on CT Angio PE protocol, however, elevated troponin No renal dysfunction. Coagulation studies on admission WNL. LDH and haptoglobin do not suggest hemolytic process. TSH WNL.     Differential includes idiopathic thrombocytopenia (giant platelets), less likely to be microangiopathic process such as TTP or HUS (LDH and Hapto WNL, no schistocytes, quick return to baseline s/p head trauma, relatively stable platelet count), unlikely DIC (fibrinogen 349). Unlikely nutritional deficit, folate and B12 WNL. Cannot exclude autoimmune process (hx of cold urticaria).    Platelet trend: Admission 30-> -> 24-> -> 22 (today)    Plan:  - Trend daily CBC with differential  - Please check retic count, LDH, haptoglobin  - Check RONALD, RF, anti CCP  - Check Lupus anticoagulant, anticardiolipin ab, and beta 2 glycoprotein  - Would send for flow cytometry   - ITP is a diagnosis of exclusion, given no other cause found, will start steroids Dexamethasone 40mg IV x4 days  - Would monitor platelet trend inpatient until upward trend, preferably >50  - Recommend stat CT head if changes in baseline neurologic exam  - Consider neurology evaluation if cardiac work up remains negative  - Maintain active type and screen    # Leukocytosis, improving  Mildly elevated WBC with neutrophil predominance on admission. No clear infectious etiology, possibly reactive from inflammation after falling. RVP negative.  - Trend daily CBC with differential    Discussed with hematology oncology attending Dr. Marly Mercer. Recommendations are considered final after attending attestation.

## 2023-04-08 NOTE — DISCHARGE NOTE PROVIDER - NSDCFUSCHEDAPPT_GEN_ALL_CORE_FT
Angelique Mathew  [facility]  [facility] [address]  Scheduled Appointment: 04/19/2023

## 2023-04-08 NOTE — DISCHARGE NOTE PROVIDER - PROVIDER TOKENS
PROVIDER:[TOKEN:[[id number]],SCHEDULEDAPPT:[04/19/2023],SCHEDULEDAPPTTIME:[03:00 PM]],PROVIDER:[TOKEN:[[id number]],FOLLOWUP:[2 weeks]]

## 2023-04-08 NOTE — PROGRESS NOTE ADULT - PROBLEM SELECTOR PLAN 1
Pt w/ one episode of syncope while walking up stairs w/o prodromal sx  - Trop I: 9153 with second set 77558  - D-dimer 427, CT PE Neg  - EKG: rate 76, LVH, diffuse ST depressions  - Family hx of "enlarged heart" and mother w/ QT prolongation w/ ICD  - Consider EP consult   - Received ASA 325mg PO x 1 @ [facility]  - Repeat cardiac enzymes ordered, TTE, Cont tele monitor Problem/Plan - 1:  ·  Problem: Syncope.   ·  Plan: Pt w/ one episode of syncope while walking up stairs w/o prodromal sx  - Trop I: 9153 with second set 97218  - Troponin T: 0.32 -> 0.19 -> 0.12  - D-dimer 427, CT PE Neg  - EKG: rate 76, LVH, diffuse ST depressions  - Received ASA 325mg PO x 1 @ [facility]  - TTE (4/7/23) : normal wall motion/valular function. Normal LVEF  - CCTA (4/7/23):  calcium score is 0   Agatston units, angiographically normal coronary arteries  - EP consulted as mother with prolonged QT; however acquired prolongation, no concern for QT prolongation at this time  - F/u cardiac MRI   - If no cardiac causes, consider neuro consult Problem/Plan - 1:  ·  Problem: Syncope.   ·  Plan: Pt w/ one episode of syncope while walking up stairs w/o prodromal sx  - Trop I: 9153 with second set 36519  - Troponin T: 0.32 -> 0.19 -> 0.12  - D-dimer 427, CT PE Neg  - EKG: rate 76, LVH, diffuse ST depressions  - Received ASA 325mg PO x 1 @ [facility]  - TTE (4/7/23) : normal wall motion/valular function. Normal LVEF  - CCTA (4/7/23):  calcium score is 0   Agatston units, angiographically normal coronary arteries  - EP recs appreciated- consider ziopatch at discharge, stress test, and neuro work-up  - F/u cardiac MRI   - If no cardiac causes, consider neuro consult Problem/Plan - 1:  ·  Problem: Syncope.   ·  Plan: Pt w/ one episode of syncope while walking up stairs w/o prodromal sx  - Trop I: 9153 with second set 40236  - Troponin T: 0.32 -> 0.19 -> 0.12   - D-dimer 427, CT PE Neg  - EKG: rate 76, LVH, diffuse ST depressions  - Received ASA 325mg PO x 1 @ [facility]  - TTE (4/7/23) : normal wall motion/valular function. Normal LVEF  - CCTA (4/7/23):  calcium score is 0   Agatston units, angiographically normal coronary arteries  - EP recs appreciated- consider monitoring at discharge, stress test, and neuro work-up  - F/u cardiac MRI:   - If no cardiac causes, consider neuro consult Problem/Plan - 1:  ·  Problem: Syncope.   ·  Plan: Pt w/ one episode of syncope while walking up stairs w/o prodromal sx  - Trop I: 9153 with second set 73485  - Troponin T: 0.32 -> 0.19 -> 0.12   - D-dimer 427, CT PE Neg  - EKG: rate 76, LVH, diffuse ST depressions  - Received ASA 325mg PO x 1 @ [facility]  - TTE (4/7/23) : normal wall motion/valular function. Normal LVEF  - CCTA (4/7/23):  calcium score is 0  Agatston units, angiographically normal coronary arteries  - EP recs appreciated- consider monitoring at discharge, stress test, and neuro work-up  - F/u cardiac MRI:   - If no cardiac causes, consider neuro consult Problem/Plan - 1:  ·  Problem: Syncope.   ·  Plan: Pt w/ one episode of syncope while walking up stairs w/o prodromal sx  - Trop I: 9153 with second set 73550  - Troponin T: 0.32 -> 0.19 -> 0.12   - D-dimer 427, CT PE Neg  - EKG: rate 76, LVH, diffuse ST depressions  - Received ASA 325mg PO x 1 @ [facility]  - TTE (4/7/23) : normal wall motion/valular function. Normal LVEF  - CCTA (4/7/23):  calcium score is 0  Agatston units, angiographically normal coronary arteries  - EP recs appreciated- consider monitoring at discharge, stress test, and neuro work-up  - F/u cardiac MRI; pending read   - Obtain stress test on 4/10/23  - If no cardiac causes, consider neuro consult

## 2023-04-08 NOTE — DISCHARGE NOTE PROVIDER - NSDCMRMEDTOKEN_GEN_ALL_CORE_FT
Lexapro 10 mg oral tablet: 1 orally once a day  Loestrin 21 1.5/30 oral tablet: 1 orally once a day

## 2023-04-09 LAB
ANION GAP SERPL CALC-SCNC: 8 MMOL/L — SIGNIFICANT CHANGE UP (ref 5–17)
BASOPHILS # BLD AUTO: 0.02 K/UL — SIGNIFICANT CHANGE UP (ref 0–0.2)
BASOPHILS NFR BLD AUTO: 0.2 % — SIGNIFICANT CHANGE UP (ref 0–2)
BUN SERPL-MCNC: 10 MG/DL — SIGNIFICANT CHANGE UP (ref 7–23)
CALCIUM SERPL-MCNC: 8.5 MG/DL — SIGNIFICANT CHANGE UP (ref 8.4–10.5)
CHLORIDE SERPL-SCNC: 98 MMOL/L — SIGNIFICANT CHANGE UP (ref 96–108)
CO2 SERPL-SCNC: 29 MMOL/L — SIGNIFICANT CHANGE UP (ref 22–31)
CREAT SERPL-MCNC: 0.54 MG/DL — SIGNIFICANT CHANGE UP (ref 0.5–1.3)
CULTURE RESULTS: SIGNIFICANT CHANGE UP
EGFR: 127 ML/MIN/1.73M2 — SIGNIFICANT CHANGE UP
EOSINOPHIL # BLD AUTO: 0 K/UL — SIGNIFICANT CHANGE UP (ref 0–0.5)
EOSINOPHIL NFR BLD AUTO: 0 % — SIGNIFICANT CHANGE UP (ref 0–6)
GLUCOSE SERPL-MCNC: 191 MG/DL — HIGH (ref 70–99)
HAPTOGLOB SERPL-MCNC: 223 MG/DL — HIGH (ref 34–200)
HCT VFR BLD CALC: 33.9 % — LOW (ref 34.5–45)
HGB BLD-MCNC: 11.3 G/DL — LOW (ref 11.5–15.5)
IMM GRANULOCYTES NFR BLD AUTO: 1.5 % — HIGH (ref 0–0.9)
LDH SERPL L TO P-CCNC: 168 U/L — SIGNIFICANT CHANGE UP (ref 50–242)
LYMPHOCYTES # BLD AUTO: 1.16 K/UL — SIGNIFICANT CHANGE UP (ref 1–3.3)
LYMPHOCYTES # BLD AUTO: 9.4 % — LOW (ref 13–44)
MAGNESIUM SERPL-MCNC: 2 MG/DL — SIGNIFICANT CHANGE UP (ref 1.6–2.6)
MCHC RBC-ENTMCNC: 30.5 PG — SIGNIFICANT CHANGE UP (ref 27–34)
MCHC RBC-ENTMCNC: 33.3 GM/DL — SIGNIFICANT CHANGE UP (ref 32–36)
MCV RBC AUTO: 91.6 FL — SIGNIFICANT CHANGE UP (ref 80–100)
MONOCYTES # BLD AUTO: 0.24 K/UL — SIGNIFICANT CHANGE UP (ref 0–0.9)
MONOCYTES NFR BLD AUTO: 1.9 % — LOW (ref 2–14)
NEUTROPHILS # BLD AUTO: 10.73 K/UL — HIGH (ref 1.8–7.4)
NEUTROPHILS NFR BLD AUTO: 87 % — HIGH (ref 43–77)
NRBC # BLD: 0 /100 WBCS — SIGNIFICANT CHANGE UP (ref 0–0)
PLATELET # BLD AUTO: 25 K/UL — LOW (ref 150–400)
POTASSIUM SERPL-MCNC: 4.4 MMOL/L — SIGNIFICANT CHANGE UP (ref 3.5–5.3)
POTASSIUM SERPL-SCNC: 4.4 MMOL/L — SIGNIFICANT CHANGE UP (ref 3.5–5.3)
RBC # BLD: 3.7 M/UL — LOW (ref 3.8–5.2)
RBC # BLD: 3.7 M/UL — LOW (ref 3.8–5.2)
RBC # FLD: 12.6 % — SIGNIFICANT CHANGE UP (ref 10.3–14.5)
RETICS #: 95.1 K/UL — SIGNIFICANT CHANGE UP (ref 25–125)
RETICS/RBC NFR: 2.6 % — HIGH (ref 0.5–2.5)
SODIUM SERPL-SCNC: 135 MMOL/L — SIGNIFICANT CHANGE UP (ref 135–145)
SPECIMEN SOURCE: SIGNIFICANT CHANGE UP
WBC # BLD: 12.33 K/UL — HIGH (ref 3.8–10.5)
WBC # FLD AUTO: 12.33 K/UL — HIGH (ref 3.8–10.5)

## 2023-04-09 PROCEDURE — 99231 SBSQ HOSP IP/OBS SF/LOW 25: CPT

## 2023-04-09 PROCEDURE — 99233 SBSQ HOSP IP/OBS HIGH 50: CPT

## 2023-04-09 RX ADMIN — Medication 120 MILLIGRAM(S): at 22:39

## 2023-04-09 RX ADMIN — ESCITALOPRAM OXALATE 10 MILLIGRAM(S): 10 TABLET, FILM COATED ORAL at 16:34

## 2023-04-09 NOTE — PROGRESS NOTE ADULT - PROBLEM SELECTOR PLAN 3
Pt presented with WBC of 13.08 trending to15.01  - WBC downtrended to 9.67 4/8/23  - RVP negative, UA negative   - Likely reactive leukocytosis presents w/ WBC 13.08, likely reactive 2/2 LOC  -WBC has remained stable, pt afebrile and HD stable  -RVP negative  -UA negative  -Abd US unremarkable  -Continue to monitor

## 2023-04-09 NOTE — PROGRESS NOTE ADULT - PROBLEM SELECTOR PLAN 4
- Continue Lexapro 10mg daily   - Will confirm Adderall dosage and continue daily     F: None  E: K >4, Mg >2  N: DASH Diet  VTEppx: Ambulatory   Dispo: Likely home pending clinical progression appropriate mood/affect  -Continue Lexapro 10mg QD      F: None  E: Replete if K<4 or Mag<2  N: DASH Diet  VTEppx: none OOB  Dispo: Cardiac tele  PT: No needs

## 2023-04-09 NOTE — PROGRESS NOTE ADULT - PROBLEM SELECTOR PLAN 2
Plt count stable on hi dose Decadron 40 mg daily x 4 days...    Upon DC ,FU with our [facility] clinic

## 2023-04-09 NOTE — PROGRESS NOTE ADULT - SUBJECTIVE AND OBJECTIVE BOX
Hematology Oncology Progress Note      HPI:  33yo female, with family hx of QT prolongation (mother w/ icd) and aunt with who  of "enlarged heart" and PMHx of  anxiety (on Lexapro) and OCP use,  who initially presented to [facility] 23 s/p syncopal episode.. Pt states that before the syncopal event she was walking up 6 flights of stairs with her laundry and felt SOB but reported that was normal for her. Admits to head trauma and episodes of vomiting on her way to the ED following the event. States that this has never happened to her before and denies any cardiac hx.  Patient reports hx of easy bruising and rash when exposed to cold over the past year. Patient also notices her appetite has not been the same since she had COVID in . Patient denies any preceding symptoms of nausea, diaphoresis, palpitations, dizziness, chest pain, SOB, recent travel or sick contacts. In [facility], BP: 112/82, HR: 100s, RR: 16, Temp: 97.9F, O2 sat: 99% RA, Fingerstick 96. EKG revealed 76bpm, LVH and diffuse ST depressions throughout. Labs notable for: WBC 14.64 with left shift,  Platelets 30k-->26k, Mag 1.3, Troponin I 9153 with second set 37229, D-dimer 427. CT head unremarkable. CTA Chest negative for PE (limited study 2/2 motion artifact). Patient treated with ASA 325mg PO x 1 dose and Mg 2g IV x 1 dose. Patient now transferred to [facility] cardiac telemetry for serial enzymes and further cardiac work-up. Pt upon arrival to [facility] pt does not appear to be in distress but is anxious, VSS, cardiology team will continue to monitor.    (2023 07:32)      SUBJECTIVE: Patient seen and examined at bedside. Denies fever, headache, nausea, vomiting, chest pain, shortness of breath, abdominal pain, changes in bowel movements or urination.     OBJECTIVE:    VITAL SIGNS:  ICU Vital Signs Last 24 Hrs  T(C): 36.3 (2023 13:36), Max: 36.9 (2023 21:35)  T(F): 97.4 (2023 13:36), Max: 98.5 (2023 21:35)  HR: 62 (2023 09:30) (53 - 83)  BP: 120/74 (2023 09:30) (104/74 - 120/74)  BP(mean): --  ABP: --  ABP(mean): --  RR: 18 (2023 09:30) (17 - 18)  SpO2: 100% (2023 09:30) (99% - 100%)    O2 Parameters below as of 2023 09:30  Patient On (Oxygen Delivery Method): room air               @ 07:  -   @ 07:00  --------------------------------------------------------  IN: 0 mL / OUT: 0 mL / NET: 0 mL     @ 07:  -   @ 16:43  --------------------------------------------------------  IN: 0 mL / OUT: 0 mL / NET: 0 mL      CAPILLARY BLOOD GLUCOSE          PHYSICAL EXAM:  General: NAD, answering questions, pleasantly conversant  HEENT: NC, large soft tissue swelling and bruise on forehead; PERRL, clear conjunctiva, no oral petechiae  Neck: supple  Respiratory: CTA b/l  Cardiovascular: +S1/S2; RRR  Abdomen: soft, NT/ND; +BS x4  Extremities: WWP, 2+ peripheral pulses b/l; no LE edema  Skin: normal color and turgor; few faint scattered areas of petechiae on arms and legs, improved  Neurological: AAOx3    MEDICATIONS:  MEDICATIONS  (STANDING):  dexAMETHasone  IVPB 40 milliGRAM(s) IV Intermittent daily  escitalopram 10 milliGRAM(s) Oral daily  Lo Estrin Fe 1 Tablet(s) 1 Tablet(s) Oral daily    MEDICATIONS  (PRN):      ALLERGIES:  Allergies    No Known Allergies    Intolerances        LABS:                        11.3   12.33 )-----------( 25       ( 2023 06:12 )             33.9         135  |  98  |  10  ----------------------------<  191<H>  4.4   |  29  |  0.54    Ca    8.5      2023 06:12  Mg     2.0         TPro  6.2  /  Alb  3.8  /  TBili  0.3  /  DBili  x   /  AST  27  /  ALT  22  /  AlkPhos  71        Urinalysis Basic - ( 2023 18:25 )    Color: Yellow / Appearance: Clear / S.015 / pH: x  Gluc: x / Ketone: 15 mg/dL  / Bili: Negative / Urobili: 0.2 E.U./dL   Blood: x / Protein: NEGATIVE mg/dL / Nitrite: NEGATIVE   Leuk Esterase: NEGATIVE / RBC: x / WBC x   Sq Epi: x / Non Sq Epi: x / Bacteria: x                  RADIOLOGY & ADDITIONAL TESTS: Reviewed.

## 2023-04-09 NOTE — PROGRESS NOTE ADULT - ASSESSMENT
32F w/ PMHx of Anxiety, initially presented to [facility] after syncopal episode and found to have elevated Trop w/ diffuse STD. Pt transferred to [facility] Cardiac tele for further management and now s/p normal TTE and CCTA, pending cMRI results to r/o myocarditis. Hospital course significant for thrombocytopenia, heme/onc following.  32F w/ PMHx of Anxiety, initially presented to [facility] after syncopal episode and found to have elevated Trop w/ diffuse STD. Pt transferred to [facility] Cardiac tele for further management and now s/p normal TTE and CCTA, pending cMRI results to r/o myocarditis. Hospital course significant for thrombocytopenia, heme/onc following. 32F w/ FHx prolonged QTC (s/p PPM), and  PMHx of Anxiety, initially presented to [facility] after syncopal episode and found to have elevated Trop w/ diffuse STD on EKG. Pt transferred to [facility] Cardiac tele for further management and now s/p normal TTE and CCTA, pending cMRI results to r/o myocarditis. Hospital course significant for thrombocytopenia, heme/onc following.

## 2023-04-09 NOTE — PROGRESS NOTE ADULT - PROBLEM SELECTOR PLAN 2
Pt presented with PLT of 30 trending to 24; + petechia on body   - Heme/Onc consulted, recs appreciated. 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

## 2023-04-09 NOTE — PROGRESS NOTE ADULT - ASSESSMENT
31 yo female, FamHx mother w/ prolonged QT (likely acquired has PPM), Aunt who  of "enlarged heart" age 50, and PMHx of anxiety (on Lexapro) and OCP use who presented to [facility] 23 after syncopal episode after walking up 6 flights of stairs w/ prodrome of dizziness and SOB. Hematology consulted for thrombocytopenia.    # Thrombocytopenia  No personal history of thrombocytopenia. Has not had labs checked in several years. No personal or family history of bleeding or bruising disorders. No new medications or recent infections. Platelet trend from admission 30->26->24.  PLASMIC score for TTP in the intermediate range. However, LDH and haptoglobin are normal and peripheral smear review by fellow 23 does not show schistocytes. Giant platelets are present . No PE noted on CT Angio PE protocol, however, elevated troponin No renal dysfunction. Coagulation studies on admission WNL. LDH and haptoglobin do not suggest hemolytic process. TSH WNL.     Differential includes idiopathic thrombocytopenia (giant platelets), less likely to be microangiopathic process such as TTP or HUS (LDH and Hapto WNL, no schistocytes, quick return to baseline s/p head trauma, relatively stable platelet count), unlikely DIC (fibrinogen 349). Unlikely nutritional deficit, folate and B12 WNL. Cannot exclude autoimmune process (hx of cold urticaria).    Platelet trend: Admission 30-> 26-> 24-> 21-> 22-> 25 (today)    Plan:  - Trend daily CBC with differential  - Check RONALD, RF, anti CCP  - Check Lupus anticoagulant, anticardiolipin ab, and beta 2 glycoprotein  - Would send for flow cytometry   - ITP is a diagnosis of exclusion, given no other cause found, will start steroids Dexamethasone 40mg IV x4 days  - Would monitor platelet trend inpatient until upward trend, preferably >50  - Recommend stat CT head if changes in baseline neurologic exam  - Consider neurology evaluation if cardiac work up remains negative  - Maintain active type and screen  - When medically ready for discharge, patient should follow with a hematologist. Can schedule an appointment with Dr. Angelique Mathew at [facility] by calling [phone]. Should be seen within 7-10 days for repeat CBC with differential.    # Leukocytosis, improving  Mildly elevated WBC with neutrophil predominance on admission. No clear infectious etiology, possibly reactive from inflammation after falling. RVP negative. Now likely elevated again in setting of steroids  - Trend daily CBC with differential    Discussed with hematology oncology attending Dr. Marly Mercer. Recommendations are considered final after attending attestation.  Right LE

## 2023-04-09 NOTE — PROGRESS NOTE ADULT - PROBLEM SELECTOR PLAN 1
Pt w/ one episode of syncope while walking up stairs w/o prodromal sx  - Trop I: 9153 with second set 92678  - Troponin T: 0.32 -> 0.19 -> 0.12   - D-dimer 427, CT PE Neg  - EKG: rate 76, LVH, diffuse ST depressions  - Received ASA 325mg PO x 1 @ [facility]  - TTE (4/7/23) : normal wall motion/valular function. Normal LVEF  - CCTA (4/7/23):  calcium score is 0  Agatston units, angiographically normal coronary arteries  - EP recs appreciated- consider monitoring at discharge, stress test, and neuro work-up  - F/u cardiac MRI; pending read   - Obtain stress test on 4/10/23  - If no cardiac causes, consider neuro consult presents after LOC w/ head trauma when walking up 6 flight of stairs w/ associated SOB, pt currently asymptomatic and HD stable  -Trop I 9153 > 80498, Trop T 0.32>0.19>0.12  -EKG: SR w/ diffuse STD  -D-dimer 427, CTA PE negative  -CTH negative  -TTE 4/7: LVEF 65%, no valvular disease  -CCTA 4/7: Ca score 0, normal coronaries  -f/u cMRI results to r/o Myocarditis  -NPO after MN for exercise stress to r/o arrhythmia  -EP consulted for cardiac monitor

## 2023-04-09 NOTE — PROGRESS NOTE ADULT - SUBJECTIVE AND OBJECTIVE BOX
Patient feels well, lying in bed.  Still waiting on interpretation of cardiac MRI from Friday evening.  She has some asymptomatic nocturnal bradycardia which does not seem clinically relevant.  She is scheduled for a maximal stress test tomorrow to evaluate for exercise induced arrhythmias.  Should have MRI interpreted by tomorrow to help in further management decision tree.

## 2023-04-09 NOTE — PROGRESS NOTE ADULT - SUBJECTIVE AND OBJECTIVE BOX
Cardiology PA Adult Progress Note    SUBJECTIVE ASSESSMENT:  	  MEDICATIONS:  escitalopram 10 milliGRAM(s) Oral daily  dexAMETHasone  IVPB 40 milliGRAM(s) IV Intermittent daily  	  VITAL SIGNS:  T(C): 36.2 (04-09-23 @ 06:00), Max: 36.9 (04-08-23 @ 13:09)  HR: 53 (04-09-23 @ 07:08) (53 - 83)  BP: 108/51 (04-09-23 @ 07:08) (104/74 - 113/56)  RR: 17 (04-09-23 @ 07:08) (17 - 18)  SpO2: 100% (04-09-23 @ 07:08) (95% - 100%)    I&O's Summary  08 Apr 2023 07:01  -  09 Apr 2023 07:00  --------------------------------------------------------  IN: 0 mL / OUT: 0 mL / NET: 0 mL                                     PHYSICAL EXAM:  Appearance: Normal	  HEENT: Normal oral mucosa, PERRL, EOMI	  Neck: Supple, - JVD; no Carotid Bruit   Cardiovascular: Normal S1 S2, No murmurs  Respiratory: Lungs clear to auscultation, No Rales, Rhonchi, Wheezing	  Gastrointestinal:  Soft, Non-tender, + BS	  Skin: No rashes, No ecchymoses, No cyanosis  Extremities: Normal range of motion, No clubbing, cyanosis or edema  Vascular: Peripheral pulses palpable 2+ bilaterally  Neurologic: Non-focal  Psychiatry: A & O x 3, Mood & affect appropriate    LABS:	 	                    11.3   12.33 )-----------( 25       ( 09 Apr 2023 06:12 )             33.9     04-09    135  |  98  |  10  ----------------------------<  191<H>  4.4   |  29  |  0.54    Ca    8.5      09 Apr 2023 06:12  Mg     2.0     04-09    TPro  6.2  /  Alb  3.8  /  TBili  0.3  /  DBili  x   /  AST  27  /  ALT  22  /  AlkPhos  71  04-08     Cardiology PA Adult Progress Note    SUBJECTIVE ASSESSMENT: Pt seen and examined at bedside this AM laying comfortably in bed w/o any complaints or events overnight. She states that she feels well and denies any lightheadedness, dizziness, HA, CP, palpitations, SOB, orthopnea, PND, N/V or abd pain.  	  MEDICATIONS:  escitalopram 10 milliGRAM(s) Oral daily  dexAMETHasone  IVPB 40 milliGRAM(s) IV Intermittent daily  	  VITAL SIGNS:  T(C): 36.2 (04-09-23 @ 06:00), Max: 36.9 (04-08-23 @ 13:09)  HR: 53 (04-09-23 @ 07:08) (53 - 83)  BP: 108/51 (04-09-23 @ 07:08) (104/74 - 113/56)  RR: 17 (04-09-23 @ 07:08) (17 - 18)  SpO2: 100% (04-09-23 @ 07:08) (95% - 100%)    PHYSICAL EXAM:  Appearance: Normal	  HEENT: Normal oral mucosa, PERRL, EOMI	  Neck: Supple, - JVD; no Carotid Bruit   Cardiovascular: Normal S1 S2, No murmurs  Respiratory: Lungs clear to auscultation, No Rales, Rhonchi, Wheezing	  Gastrointestinal:  Soft, Non-tender, + BS	  Skin: No rashes, No ecchymoses, No cyanosis  Extremities: Normal range of motion, No clubbing, cyanosis or edema  Vascular: Peripheral pulses palpable 2+ bilaterally  Neurologic: Non-focal  Psychiatry: A & O x 3, Mood & affect appropriate    LABS:	 	                    11.3   12.33 )-----------( 25       ( 09 Apr 2023 06:12 )             33.9     04-09    135  |  98  |  10  ----------------------------<  191<H>  4.4   |  29  |  0.54    Ca    8.5      09 Apr 2023 06:12  Mg     2.0     04-09    TPro  6.2  /  Alb  3.8  /  TBili  0.3  /  DBili  x   /  AST  27  /  ALT  22  /  AlkPhos  71  04-08

## 2023-04-10 ENCOUNTER — TRANSCRIPTION ENCOUNTER (OUTPATIENT)
Age: 33
End: 2023-04-10

## 2023-04-10 VITALS
DIASTOLIC BLOOD PRESSURE: 63 MMHG | OXYGEN SATURATION: 97 % | RESPIRATION RATE: 18 BRPM | HEART RATE: 66 BPM | SYSTOLIC BLOOD PRESSURE: 111 MMHG

## 2023-04-10 PROBLEM — Z00.00 ENCOUNTER FOR PREVENTIVE HEALTH EXAMINATION: Status: ACTIVE | Noted: 2023-04-10

## 2023-04-10 LAB
ANION GAP SERPL CALC-SCNC: 6 MMOL/L — SIGNIFICANT CHANGE UP (ref 5–17)
BASOPHILS # BLD AUTO: 0.02 K/UL — SIGNIFICANT CHANGE UP (ref 0–0.2)
BASOPHILS NFR BLD AUTO: 0.1 % — SIGNIFICANT CHANGE UP (ref 0–2)
BLD GP AB SCN SERPL QL: NEGATIVE — SIGNIFICANT CHANGE UP
BUN SERPL-MCNC: 12 MG/DL — SIGNIFICANT CHANGE UP (ref 7–23)
CALCIUM SERPL-MCNC: 8.7 MG/DL — SIGNIFICANT CHANGE UP (ref 8.4–10.5)
CARDIOLIPIN AB SER-ACNC: NEGATIVE — SIGNIFICANT CHANGE UP
CCP IGG SERPL-ACNC: <8 UNITS — SIGNIFICANT CHANGE UP
CHLORIDE SERPL-SCNC: 101 MMOL/L — SIGNIFICANT CHANGE UP (ref 96–108)
CO2 SERPL-SCNC: 28 MMOL/L — SIGNIFICANT CHANGE UP (ref 22–31)
CREAT SERPL-MCNC: 0.54 MG/DL — SIGNIFICANT CHANGE UP (ref 0.5–1.3)
EGFR: 127 ML/MIN/1.73M2 — SIGNIFICANT CHANGE UP
EOSINOPHIL # BLD AUTO: 0 K/UL — SIGNIFICANT CHANGE UP (ref 0–0.5)
EOSINOPHIL NFR BLD AUTO: 0 % — SIGNIFICANT CHANGE UP (ref 0–6)
GLUCOSE SERPL-MCNC: 167 MG/DL — HIGH (ref 70–99)
HAPTOGLOB SERPL-MCNC: 239 MG/DL — HIGH (ref 34–200)
HCT VFR BLD CALC: 33.3 % — LOW (ref 34.5–45)
HGB BLD-MCNC: 11.2 G/DL — LOW (ref 11.5–15.5)
HIV-1 VIRAL LOAD RESULT: SIGNIFICANT CHANGE UP
HIV1 RNA # SERPL NAA+PROBE: SIGNIFICANT CHANGE UP COPIES/ML
HIV1 RNA SER-IMP: SIGNIFICANT CHANGE UP
HIV1 RNA SERPL NAA+PROBE-ACNC: SIGNIFICANT CHANGE UP
HIV1 RNA SERPL NAA+PROBE-LOG#: SIGNIFICANT CHANGE UP LG COP/ML
IMM GRANULOCYTES NFR BLD AUTO: 2 % — HIGH (ref 0–0.9)
LDH SERPL L TO P-CCNC: 151 U/L — SIGNIFICANT CHANGE UP (ref 50–242)
LYMPHOCYTES # BLD AUTO: 1.56 K/UL — SIGNIFICANT CHANGE UP (ref 1–3.3)
LYMPHOCYTES # BLD AUTO: 11.3 % — LOW (ref 13–44)
MAGNESIUM SERPL-MCNC: 1.9 MG/DL — SIGNIFICANT CHANGE UP (ref 1.6–2.6)
MCHC RBC-ENTMCNC: 31.2 PG — SIGNIFICANT CHANGE UP (ref 27–34)
MCHC RBC-ENTMCNC: 33.6 GM/DL — SIGNIFICANT CHANGE UP (ref 32–36)
MCV RBC AUTO: 92.8 FL — SIGNIFICANT CHANGE UP (ref 80–100)
MONOCYTES # BLD AUTO: 0.32 K/UL — SIGNIFICANT CHANGE UP (ref 0–0.9)
MONOCYTES NFR BLD AUTO: 2.3 % — SIGNIFICANT CHANGE UP (ref 2–14)
NEUTROPHILS # BLD AUTO: 11.62 K/UL — HIGH (ref 1.8–7.4)
NEUTROPHILS NFR BLD AUTO: 84.3 % — HIGH (ref 43–77)
NRBC # BLD: 0 /100 WBCS — SIGNIFICANT CHANGE UP (ref 0–0)
PLATELET # BLD AUTO: 99 K/UL — LOW (ref 150–400)
POTASSIUM SERPL-MCNC: 4.7 MMOL/L — SIGNIFICANT CHANGE UP (ref 3.5–5.3)
POTASSIUM SERPL-SCNC: 4.7 MMOL/L — SIGNIFICANT CHANGE UP (ref 3.5–5.3)
RBC # BLD: 3.59 M/UL — LOW (ref 3.8–5.2)
RBC # BLD: 3.59 M/UL — LOW (ref 3.8–5.2)
RBC # FLD: 12.8 % — SIGNIFICANT CHANGE UP (ref 10.3–14.5)
RETICS #: 90.8 K/UL — SIGNIFICANT CHANGE UP (ref 25–125)
RETICS/RBC NFR: 2.5 % — SIGNIFICANT CHANGE UP (ref 0.5–2.5)
RF+CCP IGG SER-IMP: NEGATIVE — SIGNIFICANT CHANGE UP
RH IG SCN BLD-IMP: POSITIVE — SIGNIFICANT CHANGE UP
RHEUMATOID FACT SERPL-ACNC: <10 IU/ML — SIGNIFICANT CHANGE UP (ref 0–13)
SODIUM SERPL-SCNC: 135 MMOL/L — SIGNIFICANT CHANGE UP (ref 135–145)
WBC # BLD: 13.8 K/UL — HIGH (ref 3.8–10.5)
WBC # FLD AUTO: 13.8 K/UL — HIGH (ref 3.8–10.5)

## 2023-04-10 PROCEDURE — 71275 CT ANGIOGRAPHY CHEST: CPT

## 2023-04-10 PROCEDURE — 86850 RBC ANTIBODY SCREEN: CPT

## 2023-04-10 PROCEDURE — 93306 TTE W/DOPPLER COMPLETE: CPT

## 2023-04-10 PROCEDURE — 88184 FLOWCYTOMETRY/ TC 1 MARKER: CPT

## 2023-04-10 PROCEDURE — 84702 CHORIONIC GONADOTROPIN TEST: CPT

## 2023-04-10 PROCEDURE — 85379 FIBRIN DEGRADATION QUANT: CPT

## 2023-04-10 PROCEDURE — 85384 FIBRINOGEN ACTIVITY: CPT

## 2023-04-10 PROCEDURE — 99285 EMERGENCY DEPT VISIT HI MDM: CPT

## 2023-04-10 PROCEDURE — 84484 ASSAY OF TROPONIN QUANT: CPT

## 2023-04-10 PROCEDURE — 83010 ASSAY OF HAPTOGLOBIN QUANT: CPT

## 2023-04-10 PROCEDURE — 86900 BLOOD TYPING SEROLOGIC ABO: CPT

## 2023-04-10 PROCEDURE — 76700 US EXAM ABDOM COMPLETE: CPT

## 2023-04-10 PROCEDURE — 83735 ASSAY OF MAGNESIUM: CPT

## 2023-04-10 PROCEDURE — 80074 ACUTE HEPATITIS PANEL: CPT

## 2023-04-10 PROCEDURE — 88185 FLOWCYTOMETRY/TC ADD-ON: CPT

## 2023-04-10 PROCEDURE — 82607 VITAMIN B-12: CPT

## 2023-04-10 PROCEDURE — 85027 COMPLETE CBC AUTOMATED: CPT

## 2023-04-10 PROCEDURE — 36415 COLL VENOUS BLD VENIPUNCTURE: CPT

## 2023-04-10 PROCEDURE — 85598 HEXAGNAL PHOSPH PLTLT NEUTRL: CPT

## 2023-04-10 PROCEDURE — 75574 CT ANGIO HRT W/3D IMAGE: CPT

## 2023-04-10 PROCEDURE — 0225U NFCT DS DNA&RNA 21 SARSCOV2: CPT

## 2023-04-10 PROCEDURE — 85025 COMPLETE CBC W/AUTO DIFF WBC: CPT

## 2023-04-10 PROCEDURE — 87635 SARS-COV-2 COVID-19 AMP PRB: CPT

## 2023-04-10 PROCEDURE — 99239 HOSP IP/OBS DSCHRG MGMT >30: CPT

## 2023-04-10 PROCEDURE — 86200 CCP ANTIBODY: CPT

## 2023-04-10 PROCEDURE — 82553 CREATINE MB FRACTION: CPT

## 2023-04-10 PROCEDURE — 83615 LACTATE (LD) (LDH) ENZYME: CPT

## 2023-04-10 PROCEDURE — 87536 HIV-1 QUANT&REVRSE TRNSCRPJ: CPT

## 2023-04-10 PROCEDURE — 86901 BLOOD TYPING SEROLOGIC RH(D): CPT

## 2023-04-10 PROCEDURE — 80048 BASIC METABOLIC PNL TOTAL CA: CPT

## 2023-04-10 PROCEDURE — 86038 ANTINUCLEAR ANTIBODIES: CPT

## 2023-04-10 PROCEDURE — 80061 LIPID PANEL: CPT

## 2023-04-10 PROCEDURE — 87205 SMEAR GRAM STAIN: CPT

## 2023-04-10 PROCEDURE — 96374 THER/PROPH/DIAG INJ IV PUSH: CPT

## 2023-04-10 PROCEDURE — 86431 RHEUMATOID FACTOR QUANT: CPT

## 2023-04-10 PROCEDURE — 70450 CT HEAD/BRAIN W/O DYE: CPT

## 2023-04-10 PROCEDURE — 85045 AUTOMATED RETICULOCYTE COUNT: CPT

## 2023-04-10 PROCEDURE — 93005 ELECTROCARDIOGRAM TRACING: CPT

## 2023-04-10 PROCEDURE — 82746 ASSAY OF FOLIC ACID SERUM: CPT

## 2023-04-10 PROCEDURE — 85007 BL SMEAR W/DIFF WBC COUNT: CPT

## 2023-04-10 PROCEDURE — 81003 URINALYSIS AUTO W/O SCOPE: CPT

## 2023-04-10 PROCEDURE — 85049 AUTOMATED PLATELET COUNT: CPT

## 2023-04-10 PROCEDURE — 87086 URINE CULTURE/COLONY COUNT: CPT

## 2023-04-10 PROCEDURE — 75561 CARDIAC MRI FOR MORPH W/DYE: CPT

## 2023-04-10 PROCEDURE — 84443 ASSAY THYROID STIM HORMONE: CPT

## 2023-04-10 PROCEDURE — 82962 GLUCOSE BLOOD TEST: CPT

## 2023-04-10 PROCEDURE — 82550 ASSAY OF CK (CPK): CPT

## 2023-04-10 PROCEDURE — 80053 COMPREHEN METABOLIC PANEL: CPT

## 2023-04-10 PROCEDURE — 83036 HEMOGLOBIN GLYCOSYLATED A1C: CPT

## 2023-04-10 PROCEDURE — 85730 THROMBOPLASTIN TIME PARTIAL: CPT

## 2023-04-10 PROCEDURE — 85610 PROTHROMBIN TIME: CPT

## 2023-04-10 PROCEDURE — A9577: CPT

## 2023-04-10 PROCEDURE — 86147 CARDIOLIPIN ANTIBODY EA IG: CPT

## 2023-04-10 PROCEDURE — 83540 ASSAY OF IRON: CPT

## 2023-04-10 PROCEDURE — 93017 CV STRESS TEST TRACING ONLY: CPT

## 2023-04-10 RX ORDER — DEXAMETHASONE 0.5 MG/5ML
40 ELIXIR ORAL ONCE
Refills: 0 | Status: COMPLETED | OUTPATIENT
Start: 2023-04-10 | End: 2023-04-10

## 2023-04-10 RX ADMIN — Medication 40 MILLIGRAM(S): at 18:26

## 2023-04-10 RX ADMIN — ESCITALOPRAM OXALATE 10 MILLIGRAM(S): 10 TABLET, FILM COATED ORAL at 12:19

## 2023-04-10 NOTE — PROGRESS NOTE ADULT - PROBLEM SELECTOR PLAN 2
Plt count stable on hi dose Decadron 40 mg daily x 4 days...    Upon DC ,FU with our [facility] clinic Plt count up to 90K on hi dose Decadron 40 mg daily x 4 days...which she completes today...    Upon DC ,FU with our [facility] clinic

## 2023-04-10 NOTE — PROGRESS NOTE ADULT - PROVIDER SPECIALTY LIST ADULT
Electrophysiology
Electrophysiology
Heme/Onc
Cardiology
Cardiology
Heme/Onc
Heme/Onc
Intervent Cardiology

## 2023-04-10 NOTE — PROGRESS NOTE ADULT - ATTENDING COMMENTS
Pt admitted with syncopal episode , found to have Thrombocytopenia, suspected to be ITP...started on hi dose steroids with stable plt count...
OK to DC pt today, to FU with Hematology in clinic
Pt to complete 4 days of hi dose Decadron    discussed in detail with fellow

## 2023-04-10 NOTE — PROGRESS NOTE ADULT - PROBLEM SELECTOR PLAN 4
appropriate mood/affect  -Continue Lexapro 10mg QD      F: None  E: Replete if K<4 or Mag<2  N: DASH Diet  VTEppx: none OOB  Dispo: Cardiac tele  PT: No needs

## 2023-04-10 NOTE — PROGRESS NOTE ADULT - ASSESSMENT
31 yo female, FamHx mother w/ prolonged QT (likely acquired has PPM), Aunt who  of "enlarged heart" age 50, and PMHx of anxiety (on Lexapro) and OCP use who presented to [facility] 23 after syncopal episode after walking up 6 flights of stairs w/ prodrome of dizziness and SOB. Hematology consulted for thrombocytopenia.    # Thrombocytopenia  No personal history of thrombocytopenia. Has not had labs checked in several years. No personal or family history of bleeding or bruising disorders. No new medications or recent infections. Platelet trend from admission 30->26->24.  PLASMIC score for TTP in the intermediate range. However, LDH and haptoglobin are normal and peripheral smear review by fellow 23 does not show schistocytes. Giant platelets are present . No PE noted on CT Angio PE protocol, however, elevated troponin No renal dysfunction. Coagulation studies on admission WNL. LDH and haptoglobin do not suggest hemolytic process. TSH WNL. Hepatitis negative.    Differential includes:  - idiopathic thrombocytopenia (giant platelets),  ITP is a diagnosis of exclusion, given no other cause found, started steroids. Given significant improvement in thrombocytopenia, ITP likely cause.  - unlikely to be microangiopathic process such as TTP or HUS (LDH and Hapto WNL, no schistocytes, quick return to baseline s/p head trauma, relatively stable platelet count),   - unlikely DIC (fibrinogen 349). Unlikely nutritional deficit, folate and B12 WNL.   - Cannot exclude autoimmune process (hx of cold urticaria).     Platelet trend: Admission 30-> 26-> 24-> 21-> 22-> 25-> 99 (today)    Plan:  - f/u HIV  - Trend daily CBC with differential  - f/u RONALD  - f/u Lupus anticoagulant, anticardiolipin ab, and beta 2 glycoprotein  - f/u flow cytometry   - Dexamethasone 40mg IV x4 days, can change last dose to PO if patient being discharged today  - Would monitor platelet trend inpatient until upward trend, preferably >50  - Consider neurology evaluation if cardiac work up remains negative  - Maintain active type and screen  - When medically ready for discharge, patient should follow with a hematologist. Can schedule an appointment with Dr. Angelique Mathew at [facility] by calling [phone]. Should be seen within 7-10 days for repeat CBC with differential. Please ensure appointment is made prior to discharge.    # Leukocytosis  Mildly elevated WBC with neutrophil predominance on admission. No clear infectious etiology, possibly reactive from inflammation after falling. RVP negative. Now likely elevated again in setting of steroids  - Trend daily CBC with differential    Discussed with hematology oncology attending Dr. Marly Mercer. Recommendations are considered final after attending attestation.

## 2023-04-10 NOTE — PROGRESS NOTE ADULT - SUBJECTIVE AND OBJECTIVE BOX
Hematology Oncology Progress Note      HPI:  31yo female, with family hx of QT prolongation (mother w/ icd) and aunt with who  of "enlarged heart" and PMHx of  anxiety (on Lexapro) and OCP use,  who initially presented to [facility] 23 s/p syncopal episode.. Pt states that before the syncopal event she was walking up 6 flights of stairs with her laundry and felt SOB but reported that was normal for her. Admits to head trauma and episodes of vomiting on her way to the ED following the event. States that this has never happened to her before and denies any cardiac hx.  Patient reports hx of easy bruising and rash when exposed to cold over the past year. Patient also notices her appetite has not been the same since she had COVID in . Patient denies any preceding symptoms of nausea, diaphoresis, palpitations, dizziness, chest pain, SOB, recent travel or sick contacts. In [facility], BP: 112/82, HR: 100s, RR: 16, Temp: 97.9F, O2 sat: 99% RA, Fingerstick 96. EKG revealed 76bpm, LVH and diffuse ST depressions throughout. Labs notable for: WBC 14.64 with left shift,  Platelets 30k-->26k, Mag 1.3, Troponin I 9153 with second set 66529, D-dimer 427. CT head unremarkable. CTA Chest negative for PE (limited study 2/2 motion artifact). Patient treated with ASA 325mg PO x 1 dose and Mg 2g IV x 1 dose. Patient now transferred to [facility] cardiac telemetry for serial enzymes and further cardiac work-up. Pt upon arrival to [facility] pt does not appear to be in distress but is anxious, VSS, cardiology team will continue to monitor.    (2023 07:32)      SUBJECTIVE: Patient seen and examined at bedside. Denies fever, headache, nausea, vomiting, chest pain, shortness of breath, abdominal pain, changes in bowel movements or urination.     OBJECTIVE:    VITAL SIGNS:  ICU Vital Signs Last 24 Hrs  T(C): 36.2 (10 Apr 2023 09:51), Max: 36.7 (2023 18:00)  T(F): 97.1 (10 Apr 2023 09:51), Max: 98.1 (2023 18:00)  HR: 60 (10 Apr 2023 12:04) (60 - 78)  BP: 113/53 (10 Apr 2023 12:04) (95/53 - 113/53)  BP(mean): 77 (10 Apr 2023 12:04) (71 - 77)  ABP: --  ABP(mean): --  RR: 18 (10 Apr 2023 12:04) (18 - 18)  SpO2: 98% (10 Apr 2023 12:04) (98% - 100%)    O2 Parameters below as of 10 Apr 2023 12:04  Patient On (Oxygen Delivery Method): room air              04-09 @ 07:01  -  -10 @ 07:00  --------------------------------------------------------  IN: 780 mL / OUT: 0 mL / NET: 780 mL    04-10 @ 07:01  -  04-10 @ 14:02  --------------------------------------------------------  IN: 0 mL / OUT: 0 mL / NET: 0 mL      CAPILLARY BLOOD GLUCOSE          PHYSICAL EXAM:  General: NAD, answering questions, pleasantly conversant  HEENT: NC/AT; PERRL, clear conjunctiva  Neck: supple  Respiratory: CTA b/l  Cardiovascular: +S1/S2; RRR  Abdomen: soft, NT/ND; +BS x4  Extremities: WWP, 2+ peripheral pulses b/l; no LE edema  Skin: normal color and turgor; no rash  Neurological: AAOx3    MEDICATIONS:  MEDICATIONS  (STANDING):  dexAMETHasone  IVPB 40 milliGRAM(s) IV Intermittent daily  escitalopram 10 milliGRAM(s) Oral daily  Lo Estrin Fe 1 Tablet(s) 1 Tablet(s) Oral daily    MEDICATIONS  (PRN):      ALLERGIES:  Allergies    No Known Allergies    Intolerances        LABS:                        11.2   13.80 )-----------( 99       ( 10 Apr 2023 07:27 )             33.3     04-10    135  |  101  |  12  ----------------------------<  167<H>  4.7   |  28  |  0.54    Ca    8.7      10 Apr 2023 07:27  Mg     1.9     04-10                      RADIOLOGY & ADDITIONAL TESTS: Reviewed.

## 2023-04-10 NOTE — PROGRESS NOTE ADULT - SUBJECTIVE AND OBJECTIVE BOX
INCOMPLETE    OVERNIGHT EVENTS:  No acute events overnight.    SUBJECTIVE / INTERVAL HPI: Patient seen and examined at bedside. Patient feels well this morning, no issues, or complaints overnight. Denies CP, SOB, dizziness/lightheadedness, palpitations  12 point ROS otherwise negative    VITAL SIGNS:  Vital Signs Last 24 Hrs  T(C): 36.2 (10 Apr 2023 09:51), Max: 36.7 (09 Apr 2023 18:00)  T(F): 97.1 (10 Apr 2023 09:51), Max: 98.1 (09 Apr 2023 18:00)  HR: 62 (10 Apr 2023 08:24) (62 - 78)  BP: 95/53 (10 Apr 2023 08:24) (95/53 - 113/53)  BP(mean): 71 (10 Apr 2023 08:24) (71 - 77)  RR: 18 (10 Apr 2023 08:24) (18 - 18)  SpO2: 99% (10 Apr 2023 08:24) (99% - 100%)    Parameters below as of 10 Apr 2023 08:24  Patient On (Oxygen Delivery Method): room air          I&O's Summary    09 Apr 2023 07:01  -  10 Apr 2023 07:00  --------------------------------------------------------  IN: 780 mL / OUT: 0 mL / NET: 780 mL    10 Apr 2023 07:01  -  10 Apr 2023 11:16  --------------------------------------------------------  IN: 0 mL / OUT: 0 mL / NET: 0 mL          PHYSICAL EXAM:    General: sitting up in bed, NAD  HEENT: conjunctiva clear; MMM  Neck: supple, no JVD  Cardiovascular: RRR, no murmurs  Respiratory: CTA B/L  Gastrointestinal: soft, NT/ND, +BS  Extremities: WWP, no edema or cyanosis  Vascular: Peripheral pulses palpable 2+ bilaterally/ carotid 2+ b/l, no bruits; radial 2+ b/l; femoral 2+ b/l no bruits; DP/PT 2+ b/l  Neurological: AAOx3, no focal deficits    MEDICATIONS:  MEDICATIONS  (STANDING):  dexAMETHasone  IVPB 40 milliGRAM(s) IV Intermittent daily  escitalopram 10 milliGRAM(s) Oral daily  Lo Estrin Fe 1 Tablet(s) 1 Tablet(s) Oral daily    MEDICATIONS  (PRN):      LABS:                        11.2   13.80 )-----------( 99       ( 10 Apr 2023 07:27 )             33.3       04-10    135  |  101  |  12  ----------------------------<  167<H>  4.7   |  28  |  0.54    Ca    8.7      10 Apr 2023 07:27  Mg     1.9     04-10                  TELEMETRY:    RADIOLOGY & ADDITIONAL TESTS: Reviewed.

## 2023-04-10 NOTE — DISCHARGE NOTE NURSING/CASE MANAGEMENT/SOCIAL WORK - PATIENT PORTAL LINK FT
You can access the FollowMyHealth Patient Portal offered by [facility] by registering at the following website: [email or web address]. By joining [facility]’s FollowMyHealth portal, you will also be able to view your health information using other applications (apps) compatible with our system.

## 2023-04-10 NOTE — PROGRESS NOTE ADULT - PROBLEM SELECTOR PLAN 3
presents w/ WBC 13.08, likely reactive 2/2 LOC  -WBC has remained stable, pt afebrile and HD stable  -RVP negative  -UA negative  -Abd US unremarkable  -Continue to monitor

## 2023-04-10 NOTE — DISCHARGE NOTE NURSING/CASE MANAGEMENT/SOCIAL WORK - NSDCPEFALRISK_GEN_ALL_CORE
For information on Fall & Injury Prevention, visit: [email or web address] OR  [email or web address] OR  https://www.cdc.gov/steadi/patient.html

## 2023-04-10 NOTE — PROGRESS NOTE ADULT - PROBLEM SELECTOR PLAN 2
presents w/ PLT 30 and petechia on body, likely idiopathic thrombocytopenia (giant platelets), Heme/Onc following  -PLT remain stable, today 4/9 25, now up to 99 4/10    -LDH and Hapto WNL, no schistocytes, fibrinogen 349, folate and B12 WNL  -Rheum labs sent  -Abd US negative for splenomegaly   -Continue Dexamethasone 40 mg IVP x 4 days (last dose 4/10)  -Maintain active T&S (next 4/10)  -Goal PLT >50 prior to discharge

## 2023-04-10 NOTE — PROGRESS NOTE ADULT - PROBLEM SELECTOR PLAN 1
presents after LOC w/ head trauma when walking up 6 flight of stairs w/ associated SOB, pt currently asymptomatic and HD stable  -Trop I 9153 > 43124, Trop T 0.32>0.19>0.12   -EKG: SR w/ diffuse STD  -D-dimer 427, CTA PE negative  -CTH negative  -TTE 4/7: LVEF 65%, no valvular disease  -CCTA 4/7: Ca score 0, normal coronaries  -f/u cMRI results to r/o Myocarditis  -NPO after MN for exercise stress to r/o arrhythmia  -EP consulted for cardiac monitor

## 2023-04-10 NOTE — PROGRESS NOTE ADULT - ASSESSMENT
32F w/ FHx prolonged QTC (s/p PPM), and  PMHx of Anxiety, initially presented to [facility] after syncopal episode and found to have elevated Trop w/ diffuse STD on EKG. Pt transferred to [facility] Cardiac tele for further management and now s/p normal TTE and CCTA, pending cMRI results to r/o myocarditis. Hospital course significant for thrombocytopenia, heme/onc following. Patient is pending Stress echo and MRI read

## 2023-04-12 LAB
ANA TITR SER: NEGATIVE — SIGNIFICANT CHANGE UP
TM INTERPRETATION: SIGNIFICANT CHANGE UP

## 2023-04-13 LAB
CONFIRM APTT STACLOT: NEGATIVE — SIGNIFICANT CHANGE UP
DRVVT RATIO: 1.07 RATIO — HIGH (ref 0–1.03)
DRVVT SCREEN TO CONFIRM RATIO: ABNORMAL

## 2023-04-14 ENCOUNTER — NON-APPOINTMENT (OUTPATIENT)
Age: 33
End: 2023-04-14

## 2023-04-14 DIAGNOSIS — I49.3 VENTRICULAR PREMATURE DEPOLARIZATION: ICD-10-CM
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
## 2023-04-14 DIAGNOSIS — R55 SYNCOPE AND COLLAPSE: ICD-10-CM

## 2023-04-14 DIAGNOSIS — F41.9 ANXIETY DISORDER, UNSPECIFIED: ICD-10-CM

## 2023-04-14 DIAGNOSIS — D69.3 IMMUNE THROMBOCYTOPENIC PURPURA: ICD-10-CM

## 2023-04-14 DIAGNOSIS — E83.42 HYPOMAGNESEMIA: ICD-10-CM
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
## 2023-04-14 DIAGNOSIS — I49.1 ATRIAL PREMATURE DEPOLARIZATION: ICD-10-CM

## 2023-04-14 DIAGNOSIS — R00.1 BRADYCARDIA, UNSPECIFIED: ICD-10-CM

## 2023-04-14 DIAGNOSIS — I45.5 OTHER SPECIFIED HEART BLOCK: ICD-10-CM

## 2023-04-14 DIAGNOSIS — F90.9 ATTENTION-DEFICIT HYPERACTIVITY DISORDER, UNSPECIFIED TYPE: ICD-10-CM
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
## 2023-04-14 DIAGNOSIS — S06.9XAA UNSPECIFIED INTRACRANIAL INJURY WITH LOSS OF CONSCIOUSNESS STATUS UNKNOWN, INITIAL ENCOUNTER: ICD-10-CM

## 2023-04-14 DIAGNOSIS — D72.829 ELEVATED WHITE BLOOD CELL COUNT, UNSPECIFIED: ICD-10-CM

## 2023-04-14 DIAGNOSIS — Z86.16 PERSONAL HISTORY OF COVID-19: ICD-10-CM

## 2023-04-14 DIAGNOSIS — D69.6 THROMBOCYTOPENIA, UNSPECIFIED: ICD-10-CM

## 2023-04-19 ENCOUNTER — NON-APPOINTMENT (OUTPATIENT)
Age: 33
End: 2023-04-19

## 2023-04-19 ENCOUNTER — LABORATORY RESULT (OUTPATIENT)
Age: 33
End: 2023-04-19

## 2023-04-19 ENCOUNTER — APPOINTMENT (OUTPATIENT)
Dept: HEMATOLOGY ONCOLOGY | Facility: CLINIC | Age: 33
End: 2023-04-19
Payer: COMMERCIAL

## 2023-04-19 ENCOUNTER — TRANSCRIPTION ENCOUNTER (OUTPATIENT)
Age: 33
End: 2023-04-19

## 2023-04-19 VITALS
RESPIRATION RATE: 18 BRPM | HEART RATE: 85 BPM | HEIGHT: 65 IN | TEMPERATURE: 98.3 F | BODY MASS INDEX: 22.82 KG/M2 | SYSTOLIC BLOOD PRESSURE: 106 MMHG | WEIGHT: 137 LBS | OXYGEN SATURATION: 97 % | DIASTOLIC BLOOD PRESSURE: 69 MMHG

## 2023-04-19 DIAGNOSIS — F90.9 ATTENTION-DEFICIT HYPERACTIVITY DISORDER, UNSPECIFIED TYPE: ICD-10-CM

## 2023-04-19 DIAGNOSIS — F41.9 ANXIETY DISORDER, UNSPECIFIED: ICD-10-CM

## 2023-04-19 PROCEDURE — 36415 COLL VENOUS BLD VENIPUNCTURE: CPT

## 2023-04-19 PROCEDURE — 99213 OFFICE O/P EST LOW 20 MIN: CPT | Mod: 25

## 2023-04-19 RX ORDER — NORETHINDRONE ACETATE AND ETHINYL ESTRADIOL, ETHINYL ESTRADIOL AND FERROUS FUMARATE 1MG-10(24)
1 MG-10 MCG / KIT ORAL
Refills: 0 | Status: ACTIVE | COMMUNITY

## 2023-04-19 RX ORDER — DEXTROAMPHETAMINE SACCHARATE, AMPHETAMINE ASPARTATE, DEXTROAMPHETAMINE SULFATE, AND AMPHETAMINE SULFATE 5; 5; 5; 5 MG/1; MG/1; MG/1; MG/1
20 TABLET ORAL
Refills: 0 | Status: ACTIVE | COMMUNITY

## 2023-04-19 RX ORDER — ESCITALOPRAM OXALATE 10 MG/1
10 TABLET, FILM COATED ORAL
Refills: 0 | Status: ACTIVE | COMMUNITY

## 2023-04-19 NOTE — PHYSICAL EXAM
[Fully active, able to carry on all pre-disease performance without restriction] : Status 0 - Fully active, able to carry on all pre-disease performance without restriction [Normal] : affect appropriate [de-identified] : supple [de-identified] : normal RR, breathing pattern [de-identified] : normal HR [de-identified] : no edema [de-identified] : not distended [de-identified] : bruising on left arm since recent hospitalization;  forehead from syncopal episode

## 2023-04-19 NOTE — REASON FOR VISIT
[Initial Consultation] : an initial consultation for [Blood Count Assessment] : blood count assessment [Parent] : parent

## 2023-04-19 NOTE — HISTORY OF PRESENT ILLNESS
[de-identified] : Aline Serra is a 32 year old  female presents to clinic for post hospital follow of thrombocytopenia. \par \par Hematology history:\par She presented to [facility] on 4/6/23 after syncopal episode. She was walking up 6 flights of stairs with her laundry and felt SOB but reported that was normal for her.  In ED, she was found to have low platelets, 30K which then trended down into the 20's.  Cardiac workup showed mild troponin elevation but echo, cardiac MRI all unremarkable.  She was treated with dexamethasone 40mg QD x 4 days for presumptive diagnosis of ITP and  platelet counts prior to discharge were 99K.  She denies unintentional weight loss, any bleeding, dizziness, headache, palpitation, chest pain.\par \par workup while hospitalized included viral studies - HIV, hep B and C serologies - all negative.  Abd US unremarkable.  RONALD normal.  B12/folate normal.  peripheral blood flow cytometry normal.  smear negative for schistocytes, consistent with ITP with giant platelets present\par \par PMH; anxiety, covid-19 x 2, vitamin D deficiency, ruptured ovarian cyst\par PSH: none\par FMH: aunt with QT prolongation, mother with ICD. no family history of blood disease \par SH: never a smoker, social alcohol use, works as an \par

## 2023-04-19 NOTE — ASSESSMENT
[FreeTextEntry1] : Aline Serra is a 32 year old female who was admitted earlier this month 4/2023 with syncope and found to have ITP.  Platelet count responded to high dose dexamethasone x 4 days.\par \par Plan:\par 1.  immune thrombocytopenia\par --discussed diagnosis in detail w/ pt and her mother\par --CBC today shows normal platelet count off steroids\par --repeat CBC in 2 weeks\par --low threshold to check in the interim given risk of relapse with ITP\par --return precautions reviewed\par --if ITP flares again would re-treat with dexamethasone;  if steroid refractory ITP then next line of therapy would be rituximab\par \par 2.  syncope\par --ITP not the cause\par --maintain f/u with cardiology given complex cardiac family hx.

## 2023-04-19 NOTE — REVIEW OF SYSTEMS
[Anxiety] : anxiety [Easy Bruising] : a tendency for easy bruising [Negative] : Endocrine [Suicidal] : not suicidal [Insomnia] : no insomnia [Depression] : no depression [Easy Bleeding] : no tendency for easy bleeding [Swollen Glands] : no swollen glands [FreeTextEntry1] : hx of cold induced urticaria

## 2023-04-27 ENCOUNTER — APPOINTMENT (OUTPATIENT)
Dept: HEART AND VASCULAR | Facility: CLINIC | Age: 33
End: 2023-04-27
Payer: COMMERCIAL

## 2023-04-27 PROCEDURE — 93248 EXT ECG>7D<15D REV&INTERPJ: CPT

## 2023-05-11 ENCOUNTER — NON-APPOINTMENT (OUTPATIENT)
Age: 33
End: 2023-05-11

## 2023-05-11 LAB
BASOPHILS # BLD AUTO: 0.02 K/UL
BASOPHILS NFR BLD AUTO: 0.3 %
EOSINOPHIL # BLD AUTO: 0.11 K/UL
EOSINOPHIL NFR BLD AUTO: 1.7 %
HCT VFR BLD CALC: 36.4 %
HGB BLD-MCNC: 12.3 G/DL
IMM GRANULOCYTES NFR BLD AUTO: 0.8 %
LYMPHOCYTES # BLD AUTO: 2.98 K/UL
LYMPHOCYTES NFR BLD AUTO: 46.5 %
MAN DIFF?: NORMAL
MCHC RBC-ENTMCNC: 30.7 PG
MCHC RBC-ENTMCNC: 33.8 GM/DL
MCV RBC AUTO: 90.8 FL
MONOCYTES # BLD AUTO: 0.45 K/UL
MONOCYTES NFR BLD AUTO: 7 %
NEUTROPHILS # BLD AUTO: 2.8 K/UL
NEUTROPHILS NFR BLD AUTO: 43.7 %
PLATELET # BLD AUTO: 141 K/UL
RBC # BLD: 4.01 M/UL
RBC # FLD: 13 %
WBC # FLD AUTO: 6.41 K/UL

## 2023-05-17 ENCOUNTER — NON-APPOINTMENT (OUTPATIENT)
Age: 33
End: 2023-05-17

## 2023-06-06 ENCOUNTER — NON-APPOINTMENT (OUTPATIENT)
Age: 33
End: 2023-06-06

## 2023-06-08 ENCOUNTER — LABORATORY RESULT (OUTPATIENT)
Age: 33
End: 2023-06-08

## 2023-06-08 ENCOUNTER — APPOINTMENT (OUTPATIENT)
Dept: HEART AND VASCULAR | Facility: CLINIC | Age: 33
End: 2023-06-08
Payer: COMMERCIAL

## 2023-06-08 ENCOUNTER — NON-APPOINTMENT (OUTPATIENT)
Age: 33
End: 2023-06-08

## 2023-06-08 VITALS
BODY MASS INDEX: 22.49 KG/M2 | HEIGHT: 65 IN | DIASTOLIC BLOOD PRESSURE: 86 MMHG | HEART RATE: 96 BPM | WEIGHT: 135 LBS | SYSTOLIC BLOOD PRESSURE: 119 MMHG | TEMPERATURE: 97.4 F

## 2023-06-08 DIAGNOSIS — Z78.9 OTHER SPECIFIED HEALTH STATUS: ICD-10-CM

## 2023-06-08 PROCEDURE — 99214 OFFICE O/P EST MOD 30 MIN: CPT | Mod: 25

## 2023-06-08 PROCEDURE — 93000 ELECTROCARDIOGRAM COMPLETE: CPT

## 2023-06-09 ENCOUNTER — APPOINTMENT (OUTPATIENT)
Dept: HEMATOLOGY ONCOLOGY | Facility: CLINIC | Age: 33
End: 2023-06-09
Payer: COMMERCIAL

## 2023-06-09 VITALS
HEART RATE: 86 BPM | RESPIRATION RATE: 18 BRPM | SYSTOLIC BLOOD PRESSURE: 115 MMHG | BODY MASS INDEX: 21.99 KG/M2 | WEIGHT: 132 LBS | TEMPERATURE: 98.6 F | HEIGHT: 65 IN | DIASTOLIC BLOOD PRESSURE: 81 MMHG | OXYGEN SATURATION: 97 %

## 2023-06-09 PROCEDURE — 99213 OFFICE O/P EST LOW 20 MIN: CPT

## 2023-06-09 PROCEDURE — 99215 OFFICE O/P EST HI 40 MIN: CPT

## 2023-06-09 NOTE — ASSESSMENT
[FreeTextEntry1] : Aline Serra is a 32 year old female who was admitted to [facility] 4/2023 with syncope and found to have ITP.  Platelet count responded to high dose dexamethasone x 4 days.  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  \par \par Plan:\par 1.  immune thrombocytopenia\par --reviewed lab results w/ pt and her mother\par --continue dex 40 mg po daily x 4 days\par --discussed options for salvage therapy.  She may respond to this course of dex again but is high risk for another relapse.  Rituximab 375 mg IV weekly x 4 is an option for 2nd line therapy of ITP.  Risks, side effects of rituximab reviewed w/ the patient and literature provided.  She provided informed consent for this treatment\par --CBC next week when she presents for rituximab\par --bleeding precautions discussed\par --alternatives to rituximab (splenectomy, TPO agonist, observe after dexamethasone for response and reserve rituximab for refractory ITP or future relapse) were discussed.\par \par 2.  syncope\par --ITP not the cause\par --maintain f/u with cardiology given complex cardiac family hx.\par \par RTC 3-4 weeks for 4th dose of rituximab treatment \par CBC, CMP

## 2023-06-09 NOTE — REASON FOR VISIT
[Follow-Up Visit] : a follow-up visit for [Blood Count Assessment] : blood count assessment [Parent] : parent [FreeTextEntry2] : ITP

## 2023-06-09 NOTE — HISTORY OF PRESENT ILLNESS
[de-identified] : Aline Serra is a 32 year old  female presents to clinic for thrombocytopenia. Today here for follow up\par \par Hematology history:\par She presented to [facility] on 4/6/23 after syncopal episode.  In ED, she was found to have low platelets, 30K which then trended down into the 20's.  Cardiac workup showed mild troponin elevation but echo, cardiac MRI all unremarkable.  She was treated with dexamethasone 40mg QD x 4 days for presumptive diagnosis of ITP and  platelet count improved to normal.  \par \par workup while hospitalized included viral studies - HIV, hep B and C serologies - all negative.  Abd US unremarkable.  RONALD normal.  B12/folate normal.  peripheral blood flow cytometry normal.  smear negative for schistocytes, consistent with ITP with giant platelets present\par \par PMH; anxiety, covid-19 x 2, vitamin D deficiency, ruptured ovarian cyst\par PSH: none\par FMH: aunt with QT prolongation, mother with ICD. no family history of blood disease \par SH: never a smoker, social alcohol use, works as an \par   [de-identified] : She presents to clinic with her mother. She experienced increased fatigue and felt weak  3 weeks ago and repeated CBC with PCP at that time, plts were over 300K. She saw a cardiologist yesterday and repeated CBC, plts were 18K.  she took dexamethasone PO 40mg yesterday evening w/o any problems. She reports fatigue, mild petechia on LE Lt>Rt, + nose bleeding when blowing nose. She had heavy period for 2 days in late May. . Otherwise she feels ok.

## 2023-06-09 NOTE — PHYSICAL EXAM
[Fully active, able to carry on all pre-disease performance without restriction] : Status 0 - Fully active, able to carry on all pre-disease performance without restriction [Normal] : no peripheral adenopathy appreciated [de-identified] : bruising on lower back, right pelvis, legs , petechia on LE

## 2023-06-09 NOTE — REVIEW OF SYSTEMS
[Anxiety] : anxiety [Easy Bruising] : a tendency for easy bruising [Negative] : Endocrine [Fatigue] : fatigue [Fever] : no fever [Chills] : no chills [Night Sweats] : night sweats [Recent Change In Weight] : ~T no recent weight change [Dysphagia] : no dysphagia [Nosebleeds] : no nosebleeds [Loss of Hearing] : no loss of hearing [Hoarseness] : no hoarseness [Odynophagia] : no odynophagia [Mucosal Pain] : no mucosal pain [Suicidal] : not suicidal [Insomnia] : no insomnia [Depression] : no depression [Easy Bleeding] : no tendency for easy bleeding [Swollen Glands] : no swollen glands [FreeTextEntry2] : rare drenching night sweats, wakes with mild sweating frequently [FreeTextEntry4] : had nose bleeding when blowing nose  [de-identified] : petechia, bruising on lower back after fall from the bed at night, no injury [FreeTextEntry1] : hx of cold induced urticaria

## 2023-06-13 ENCOUNTER — NON-APPOINTMENT (OUTPATIENT)
Age: 33
End: 2023-06-13

## 2023-06-13 PROBLEM — Z78.9 DOES NOT USE ILLICIT DRUGS: Status: ACTIVE | Noted: 2023-06-13

## 2023-06-13 NOTE — PHYSICAL EXAM
[Well Developed] : well developed [Well Nourished] : well nourished [No Acute Distress] : no acute distress [Normal S1, S2] : normal S1, S2 [Clear Lung Fields] : clear lung fields [Good Air Entry] : good air entry [No Respiratory Distress] : no respiratory distress  [Soft] : abdomen soft [Normal Gait] : normal gait [No Edema] : no edema [No Rash] : no rash [Moves all extremities] : moves all extremities [Alert and Oriented] : alert and oriented

## 2023-06-15 ENCOUNTER — APPOINTMENT (OUTPATIENT)
Dept: INFUSION THERAPY | Facility: CLINIC | Age: 33
End: 2023-06-15

## 2023-06-15 ENCOUNTER — OUTPATIENT (OUTPATIENT)
Dept: OUTPATIENT SERVICES | Facility: HOSPITAL | Age: 33
LOS: 1 days | End: 2023-06-15
Payer: COMMERCIAL

## 2023-06-15 VITALS
HEART RATE: 80 BPM | WEIGHT: 132.06 LBS | HEIGHT: 66 IN | SYSTOLIC BLOOD PRESSURE: 90 MMHG | DIASTOLIC BLOOD PRESSURE: 67 MMHG | OXYGEN SATURATION: 99 % | RESPIRATION RATE: 18 BRPM | TEMPERATURE: 98 F

## 2023-06-15 DIAGNOSIS — D69.3 IMMUNE THROMBOCYTOPENIC PURPURA: ICD-10-CM

## 2023-06-15 LAB
BASOPHILS # BLD AUTO: 0 K/UL — SIGNIFICANT CHANGE UP (ref 0–0.2)
BASOPHILS NFR BLD AUTO: 0 % — SIGNIFICANT CHANGE UP (ref 0–2)
EOSINOPHIL # BLD AUTO: 0.15 K/UL — SIGNIFICANT CHANGE UP (ref 0–0.5)
EOSINOPHIL NFR BLD AUTO: 0.9 % — SIGNIFICANT CHANGE UP (ref 0–6)
HCT VFR BLD CALC: 37.4 % — SIGNIFICANT CHANGE UP (ref 34.5–45)
HGB BLD-MCNC: 13.8 G/DL — SIGNIFICANT CHANGE UP (ref 11.5–15.5)
LYMPHOCYTES # BLD AUTO: 27.8 % — SIGNIFICANT CHANGE UP (ref 13–44)
LYMPHOCYTES # BLD AUTO: 4.49 K/UL — HIGH (ref 1–3.3)
MANUAL SMEAR VERIFICATION: SIGNIFICANT CHANGE UP
MCHC RBC-ENTMCNC: 31.4 PG — SIGNIFICANT CHANGE UP (ref 27–34)
MCHC RBC-ENTMCNC: 36.9 GM/DL — HIGH (ref 32–36)
MCV RBC AUTO: 85.2 FL — SIGNIFICANT CHANGE UP (ref 80–100)
METAMYELOCYTES # FLD: 1.7 % — HIGH (ref 0–0)
MONOCYTES # BLD AUTO: 0.84 K/UL — SIGNIFICANT CHANGE UP (ref 0–0.9)
MONOCYTES NFR BLD AUTO: 5.2 % — SIGNIFICANT CHANGE UP (ref 2–14)
NEUTROPHILS # BLD AUTO: 10.41 K/UL — HIGH (ref 1.8–7.4)
NEUTROPHILS NFR BLD AUTO: 64.4 % — SIGNIFICANT CHANGE UP (ref 43–77)
PLAT MORPH BLD: NORMAL — SIGNIFICANT CHANGE UP
PLATELET # BLD AUTO: 43 K/UL — LOW (ref 150–400)
POLYCHROMASIA BLD QL SMEAR: SLIGHT — SIGNIFICANT CHANGE UP
RBC # BLD: 4.39 M/UL — SIGNIFICANT CHANGE UP (ref 3.8–5.2)
RBC # FLD: 12.6 % — SIGNIFICANT CHANGE UP (ref 10.3–14.5)
RBC BLD AUTO: ABNORMAL
SMUDGE CELLS # BLD: PRESENT — SIGNIFICANT CHANGE UP
SPHEROCYTES BLD QL SMEAR: SLIGHT — SIGNIFICANT CHANGE UP
WBC # BLD: 16.16 K/UL — HIGH (ref 3.8–10.5)
WBC # FLD AUTO: 16.16 K/UL — HIGH (ref 3.8–10.5)

## 2023-06-15 PROCEDURE — 96413 CHEMO IV INFUSION 1 HR: CPT

## 2023-06-15 PROCEDURE — 96415 CHEMO IV INFUSION ADDL HR: CPT

## 2023-06-15 PROCEDURE — 85025 COMPLETE CBC W/AUTO DIFF WBC: CPT

## 2023-06-15 PROCEDURE — 96375 TX/PRO/DX INJ NEW DRUG ADDON: CPT

## 2023-06-15 PROCEDURE — 36415 COLL VENOUS BLD VENIPUNCTURE: CPT

## 2023-06-15 RX ORDER — ACETAMINOPHEN 500 MG
650 TABLET ORAL ONCE
Refills: 0 | Status: COMPLETED | OUTPATIENT
Start: 2023-06-15 | End: 2023-06-15

## 2023-06-15 RX ORDER — DIPHENHYDRAMINE HCL 50 MG
50 CAPSULE ORAL ONCE
Refills: 0 | Status: COMPLETED | OUTPATIENT
Start: 2023-06-15 | End: 2023-06-15

## 2023-06-15 RX ORDER — RITUXIMAB 10 MG/ML
620 INJECTION, SOLUTION INTRAVENOUS ONCE
Refills: 0 | Status: COMPLETED | OUTPATIENT
Start: 2023-06-15 | End: 2023-06-15

## 2023-06-15 RX ORDER — DEXAMETHASONE 0.5 MG/5ML
10 ELIXIR ORAL ONCE
Refills: 0 | Status: COMPLETED | OUTPATIENT
Start: 2023-06-15 | End: 2023-06-15

## 2023-06-15 RX ADMIN — Medication 50 MILLIGRAM(S): at 10:00

## 2023-06-15 RX ADMIN — Medication 650 MILLIGRAM(S): at 09:43

## 2023-06-15 RX ADMIN — Medication 650 MILLIGRAM(S): at 10:43

## 2023-06-15 RX ADMIN — RITUXIMAB 620 MILLIGRAM(S): 10 INJECTION, SOLUTION INTRAVENOUS at 14:00

## 2023-06-15 RX ADMIN — RITUXIMAB 620 MILLIGRAM(S): 10 INJECTION, SOLUTION INTRAVENOUS at 10:03

## 2023-06-15 RX ADMIN — Medication 10 MILLIGRAM(S): at 09:55

## 2023-06-15 RX ADMIN — Medication 204 MILLIGRAM(S): at 09:40

## 2023-06-15 RX ADMIN — Medication 204 MILLIGRAM(S): at 09:50

## 2023-06-21 ENCOUNTER — EMERGENCY (EMERGENCY)
Facility: HOSPITAL | Age: 33
LOS: 1 days | Discharge: HOME | End: 2023-06-21
Attending: EMERGENCY MEDICINE | Admitting: EMERGENCY MEDICINE
Payer: COMMERCIAL

## 2023-06-21 ENCOUNTER — NON-APPOINTMENT (OUTPATIENT)
Age: 33
End: 2023-06-21

## 2023-06-21 VITALS
HEART RATE: 100 BPM | WEIGHT: 130.07 LBS | HEIGHT: 66 IN | RESPIRATION RATE: 18 BRPM | SYSTOLIC BLOOD PRESSURE: 129 MMHG | TEMPERATURE: 98 F | DIASTOLIC BLOOD PRESSURE: 89 MMHG | OXYGEN SATURATION: 98 %

## 2023-06-21 VITALS
DIASTOLIC BLOOD PRESSURE: 53 MMHG | RESPIRATION RATE: 18 BRPM | HEART RATE: 98 BPM | OXYGEN SATURATION: 96 % | SYSTOLIC BLOOD PRESSURE: 87 MMHG | TEMPERATURE: 98 F

## 2023-06-21 DIAGNOSIS — Z86.59 PERSONAL HISTORY OF OTHER MENTAL AND BEHAVIORAL DISORDERS: ICD-10-CM
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
## 2023-06-21 DIAGNOSIS — S00.83XA CONTUSION OF OTHER PART OF HEAD, INITIAL ENCOUNTER: ICD-10-CM

## 2023-06-21 DIAGNOSIS — F90.9 ATTENTION-DEFICIT HYPERACTIVITY DISORDER, UNSPECIFIED TYPE: ICD-10-CM

## 2023-06-21 DIAGNOSIS — Z86.16 PERSONAL HISTORY OF COVID-19: ICD-10-CM
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
## 2023-06-21 DIAGNOSIS — R55 SYNCOPE AND COLLAPSE: ICD-10-CM

## 2023-06-21 DIAGNOSIS — D69.6 THROMBOCYTOPENIA, UNSPECIFIED: ICD-10-CM

## 2023-06-21 DIAGNOSIS — S40.011A CONTUSION OF RIGHT SHOULDER, INITIAL ENCOUNTER: ICD-10-CM

## 2023-06-21 LAB
ALBUMIN SERPL ELPH-MCNC: 3.8 G/DL — SIGNIFICANT CHANGE UP (ref 3.3–5)
ALP SERPL-CCNC: 62 U/L — SIGNIFICANT CHANGE UP (ref 40–120)
ALT FLD-CCNC: 29 U/L — SIGNIFICANT CHANGE UP (ref 10–45)
ANION GAP SERPL CALC-SCNC: 12 MMOL/L — SIGNIFICANT CHANGE UP (ref 5–17)
AST SERPL-CCNC: 22 U/L — SIGNIFICANT CHANGE UP (ref 10–40)
BASOPHILS # BLD AUTO: 0.02 K/UL — SIGNIFICANT CHANGE UP (ref 0–0.2)
BASOPHILS NFR BLD AUTO: 0.2 % — SIGNIFICANT CHANGE UP (ref 0–2)
BILIRUB SERPL-MCNC: 0.2 MG/DL — SIGNIFICANT CHANGE UP (ref 0.2–1.2)
BUN SERPL-MCNC: 7 MG/DL — SIGNIFICANT CHANGE UP (ref 7–23)
CALCIUM SERPL-MCNC: 8.4 MG/DL — SIGNIFICANT CHANGE UP (ref 8.4–10.5)
CHLORIDE SERPL-SCNC: 99 MMOL/L — SIGNIFICANT CHANGE UP (ref 96–108)
CK MB CFR SERPL CALC: <1 NG/ML — SIGNIFICANT CHANGE UP (ref 0–6.7)
CK SERPL-CCNC: 37 U/L — SIGNIFICANT CHANGE UP (ref 25–170)
CO2 SERPL-SCNC: 27 MMOL/L — SIGNIFICANT CHANGE UP (ref 22–31)
CREAT SERPL-MCNC: 0.53 MG/DL — SIGNIFICANT CHANGE UP (ref 0.5–1.3)
EGFR: 128 ML/MIN/1.73M2 — SIGNIFICANT CHANGE UP
EOSINOPHIL # BLD AUTO: 0.11 K/UL — SIGNIFICANT CHANGE UP (ref 0–0.5)
EOSINOPHIL NFR BLD AUTO: 1 % — SIGNIFICANT CHANGE UP (ref 0–6)
GLUCOSE SERPL-MCNC: 85 MG/DL — SIGNIFICANT CHANGE UP (ref 70–99)
HCG SERPL-ACNC: <0 MIU/ML — SIGNIFICANT CHANGE UP
HCT VFR BLD CALC: 33.7 % — LOW (ref 34.5–45)
HGB BLD-MCNC: 12.1 G/DL — SIGNIFICANT CHANGE UP (ref 11.5–15.5)
IMM GRANULOCYTES NFR BLD AUTO: 0.8 % — SIGNIFICANT CHANGE UP (ref 0–0.9)
LYMPHOCYTES # BLD AUTO: 3.25 K/UL — SIGNIFICANT CHANGE UP (ref 1–3.3)
LYMPHOCYTES # BLD AUTO: 30.1 % — SIGNIFICANT CHANGE UP (ref 13–44)
MCHC RBC-ENTMCNC: 31.1 PG — SIGNIFICANT CHANGE UP (ref 27–34)
MCHC RBC-ENTMCNC: 35.9 GM/DL — SIGNIFICANT CHANGE UP (ref 32–36)
MCV RBC AUTO: 86.6 FL — SIGNIFICANT CHANGE UP (ref 80–100)
MONOCYTES # BLD AUTO: 0.66 K/UL — SIGNIFICANT CHANGE UP (ref 0–0.9)
MONOCYTES NFR BLD AUTO: 6.1 % — SIGNIFICANT CHANGE UP (ref 2–14)
NEUTROPHILS # BLD AUTO: 6.68 K/UL — SIGNIFICANT CHANGE UP (ref 1.8–7.4)
NEUTROPHILS NFR BLD AUTO: 61.8 % — SIGNIFICANT CHANGE UP (ref 43–77)
NRBC # BLD: 0 /100 WBCS — SIGNIFICANT CHANGE UP (ref 0–0)
PLATELET # BLD AUTO: 49 K/UL — LOW (ref 150–400)
POTASSIUM SERPL-MCNC: 4.3 MMOL/L — SIGNIFICANT CHANGE UP (ref 3.5–5.3)
POTASSIUM SERPL-SCNC: 4.3 MMOL/L — SIGNIFICANT CHANGE UP (ref 3.5–5.3)
PROT SERPL-MCNC: 6.3 G/DL — SIGNIFICANT CHANGE UP (ref 6–8.3)
RBC # BLD: 3.89 M/UL — SIGNIFICANT CHANGE UP (ref 3.8–5.2)
RBC # FLD: 12.3 % — SIGNIFICANT CHANGE UP (ref 10.3–14.5)
SODIUM SERPL-SCNC: 138 MMOL/L — SIGNIFICANT CHANGE UP (ref 135–145)
TROPONIN T, HIGH SENSITIVITY RESULT: <6 NG/L — SIGNIFICANT CHANGE UP (ref 0–51)
WBC # BLD: 10.81 K/UL — HIGH (ref 3.8–10.5)
WBC # FLD AUTO: 10.81 K/UL — HIGH (ref 3.8–10.5)

## 2023-06-21 PROCEDURE — 70450 CT HEAD/BRAIN W/O DYE: CPT | Mod: MA

## 2023-06-21 PROCEDURE — 82962 GLUCOSE BLOOD TEST: CPT

## 2023-06-21 PROCEDURE — 80053 COMPREHEN METABOLIC PANEL: CPT

## 2023-06-21 PROCEDURE — 82553 CREATINE MB FRACTION: CPT

## 2023-06-21 PROCEDURE — 70450 CT HEAD/BRAIN W/O DYE: CPT | Mod: 26,MA

## 2023-06-21 PROCEDURE — 36415 COLL VENOUS BLD VENIPUNCTURE: CPT

## 2023-06-21 PROCEDURE — 85025 COMPLETE CBC W/AUTO DIFF WBC: CPT

## 2023-06-21 PROCEDURE — 99284 EMERGENCY DEPT VISIT MOD MDM: CPT | Mod: 25

## 2023-06-21 PROCEDURE — 82550 ASSAY OF CK (CPK): CPT

## 2023-06-21 PROCEDURE — 99285 EMERGENCY DEPT VISIT HI MDM: CPT

## 2023-06-21 PROCEDURE — 84484 ASSAY OF TROPONIN QUANT: CPT

## 2023-06-21 PROCEDURE — 84702 CHORIONIC GONADOTROPIN TEST: CPT

## 2023-06-21 RX ORDER — SODIUM CHLORIDE 9 MG/ML
1000 INJECTION INTRAMUSCULAR; INTRAVENOUS; SUBCUTANEOUS ONCE
Refills: 0 | Status: COMPLETED | OUTPATIENT
Start: 2023-06-21 | End: 2023-06-21

## 2023-06-21 RX ADMIN — SODIUM CHLORIDE 1000 MILLILITER(S): 9 INJECTION INTRAMUSCULAR; INTRAVENOUS; SUBCUTANEOUS at 10:10

## 2023-06-21 RX ADMIN — SODIUM CHLORIDE 1000 MILLILITER(S): 9 INJECTION INTRAMUSCULAR; INTRAVENOUS; SUBCUTANEOUS at 12:41

## 2023-06-21 RX ADMIN — SODIUM CHLORIDE 125 MILLILITER(S): 9 INJECTION INTRAMUSCULAR; INTRAVENOUS; SUBCUTANEOUS at 14:58

## 2023-06-21 NOTE — ED PROVIDER NOTE - PHYSICAL EXAMINATION
~3cm ecchymosis R forehead, minimal overlying ttp  Ecchymosis R shoulder  No spinal ttp, neck FROM. Strength 5/5. No bony ttp, FROM all extremities. Normal equal distal pulses.   TM/EAC wnl b/l  PERRL, EOMI, no nystagmus. CN intact. No pronator drift. Sensation intact. Normal speech, no dysarthria. No carotid bruits. No temporal ttp, no nuchal rigidity.

## 2023-06-21 NOTE — ED ADULT TRIAGE NOTE - CHIEF COMPLAINT QUOTE
Pt s/p syncopal event this morning with + head injury and + LOC. Abrasion to R frontal forehead and R shoulder. 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

## 2023-06-21 NOTE — CONSULT NOTE ADULT - ASSESSMENT
33 yo F with history of prolong QT,  Anxiety, thrombocytopenia, syncope presents to ED for evaluation of syncope with head trauma.	Pt was started on rituxmab for recurrent thrombocytopenia ~1w ago. Since then has felt intermittent lightheaded, mostly w/ standing. Last night ~2300 she was standing in her apartment and she next remembers waking up on the floor (last night). Sustained ecchymosis to R shoulder and R forehead.   Patient extensive work up on last admission, MRI and CTs were negative, she also  had outpatient telemetry ,with night time HB likely vagal etiology.   Will plan for ILR implant as outpatient , as soon as we have her insurance authorization

## 2023-06-21 NOTE — ED PROVIDER NOTE - PATIENT PORTAL LINK FT
You can access the FollowMyHealth Patient Portal offered by [facility] by registering at the following website: [email or web address]. By joining [facility]’s FollowMyHealth portal, you will also be able to view your health information using other applications (apps) compatible with our system.

## 2023-06-21 NOTE — ED ADULT NURSE NOTE - OBJECTIVE STATEMENT
Pt received aaox3 s/p syncopal episode last night at aprox 2300. pt fell from a standing position with brief LOC waking up on her floor. Pt has bruising on the right shoulder and small hematoma noted on the right forehead. Pt states she has been feeling intermitted lightheadedness for several days. pt has pmhx of thrombocytopenia and started on Rituximab 1x wk ago. Pt denies any current lightheadedness. No focal weakness on exam, pt denies any HA, vision changes, N/V/D, any signs of bleeding, Cp or SOB.

## 2023-06-21 NOTE — ED ADULT NURSE NOTE - MODE OF DISCHARGE
Routine  care  Anticipatory guidance  Encourage BF  Tc Bili at 36 hrs   OAE, [facility]D, [place] screen PTD
Ambulatory

## 2023-06-21 NOTE — ED PROVIDER NOTE - CARE PLAN
Principal Discharge DX:	Syncope  Secondary Diagnosis:	Head trauma  Secondary Diagnosis:	Ecchymosis   1

## 2023-06-21 NOTE — ED PROVIDER NOTE - NSFOLLOWUPINSTRUCTIONS_ED_ALL_ED_FT
Can take tylenol 650mg every 6hrs as needed for pain.    Stay well hydrated.    Follow up with primary doctor within 1-2 days.    Return to ER for persistent fever/vomit, uncontrolled pain, focal weakness/numbness, vision changes, worsening breathing, worsening lightheaded.    Follow up with your cardiologist as soon as possible.     Continue to follow up with your hematologist!    Syncope    Syncope refers to a condition in which a person temporarily loses consciousness. Syncope may also be called fainting or passing out. It is caused by a sudden decrease in blood flow to the brain. Even though most causes of syncope are not dangerous, syncope can be a sign of a serious medical problem. Your health care provider may do tests to find the reason why you are having syncope.    Signs that you may be about to faint include:  Feeling dizzy or light-headed.  Feeling nauseous.  Seeing all white or all black in your field of vision.  Having cold, clammy skin.  If you faint, get medical help right away.   Call your local emergency services (911 in the [place] Do not drive yourself to the hospital.    Follow these instructions at home:  Pay attention to any changes in your symptoms. Take these actions to stay safe and to help relieve your symptoms:    Lifestyle     Do not drive, use machinery, or play sports until your health care provider says it is okay. Do not drink alcohol. Do not use any products that contain nicotine or tobacco, such as cigarettes and e-cigarettes. If you need help quitting, ask your health care provider. Drink enough fluid to keep your urine pale yellow.    General instructions     Take over-the-counter and prescription medicines only as told by your health care provider. If you are taking blood pressure or heart medicine, get up slowly and take several minutes to sit and then stand. This can reduce dizziness or light-headedness. Have someone stay with you until you feel stable. If you start to feel like you might faint, lie down right away and raise (elevate) your feet above the level of your heart. Breathe deeply and steadily. Wait until all the symptoms have passed. Keep all follow-up visits as told by your health care provider. This is important.   Get help right away if you:  Have a severe headache.  Faint once or repeatedly.  Have pain in your chest, abdomen, or back.  Have a very fast or irregular heartbeat (palpitations).  Have pain when you breathe.  Are bleeding from your mouth or rectum, or you have black or tarry stool.  Have a seizure.  Are confused.  Have trouble walking.  Have severe weakness.  Have vision problems.  These symptoms may represent a serious problem that is an emergency. Do not wait to see if your symptoms will go away. Get medical help right away. Call your local emergency services (911 in the [place] Do not drive yourself to the hospital.     Head Injury, Adult    There are many types of head injuries. Head injuries can be as minor as a bump, or they can be more severe. More severe head injuries include:  A jarring injury to the brain (concussion).  A bruise of the brain (contusion). This means there is bleeding in the brain that can cause swelling.  A cracked skull (skull fracture).  Bleeding in the brain that collects, clots, and forms a bump (hematoma).    After a head injury, you may need to be observed for a while in the emergency department or urgent care. Sometimes admission to the hospital is needed.    After a head injury has happened, most problems occur within the first 24 hours, but side effects may occur up to 7–10 days after the injury. It is important to watch your condition for any changes.    What are the causes?  There are many possible causes of a head injury. A serious head injury may happen to someone who is in a car accident (motor vehicle collision). Other causes of major head injuries include bicycle or motorcycle accidents, sports injuries, and falls.    Risk factors  This condition is more likely to occur in people who:  Drink a lot of alcohol or use drugs.  Are over the age of 65.  Are at risk for falls.    What are the symptoms?  There are many possible symptoms of a head injury. Visible symptoms of a head injury include a bruise, bump, or bleeding at the site of the injury. Other non-visible symptoms include:  Feeling sleepy or not being able to stay awake.  Passing out.  Headache.  Seizures.  Dizziness.  Confusion.  Memory problems.  Nausea or vomiting.  Other possible symptoms that may develop after the head injury include:  Poor attention and concentration.  Fatigue or tiring easily.  Irritability.  Being uncomfortable around bright lights or loud noises.  Anxiety or depression.  Disturbed sleep.    How is this diagnosed?  This condition can usually be diagnosed based on your symptoms, a description of the injury, and a physical exam. You may also have imaging tests done, such as a CT scan or MRI. You will also be closely watched.    How is this treated?  Treatment for this condition depends on the severity and type of injury you have. The main goal of treatment is to prevent complications and allow the brain time to heal.    For mild head injury, you may be sent home and treatment may include:  Observation. A responsible adult should stay with you for 24 hours after your injury and check on you often.  Physical rest.  Brain rest.  Pain medicines.  For severe brain injury, treatment may include:  Close observation. This includes hospitalization with frequent physical exams. You may need to go to a hospital that specializes in head injury.  Pain medicines.  Breathing support. This may include using a ventilator.  Managing the pressure inside the brain (intracranial pressure, or ICP). This may include:  Monitoring the ICP.  Giving medicines to decrease the ICP.  Positioning you to decrease the ICP.  Medicine to prevent seizures.  Surgery to stop bleeding or to remove blood clots (craniotomy).  Surgery to remove part of the skull (decompressive craniectomy). This allows room for the brain to swell.    Follow these instructions at home:  Activity   Rest as much as possible and avoid activities that are physically hard or tiring.  Make sure you get enough sleep.  Limit activities that require a lot of thought or attention, such as:  Watching TV.  Playing memory games and puzzles.  Job-related work or homework.  Working on the computer, social media, and texting.  Avoid activities that could cause another head injury, such as playing sports, until your health care provider approves. Having another head injury, especially before the first one has healed, can be dangerous.    Ask your health care provider when it is safe for you to return to your regular activities, including work or school. Ask your health care provider for a step-by-step plan for gradually returning to activities.  Ask your health care provider when you can drive, ride a bicycle, or use heavy machinery. Your ability to react may be slower after a brain injury. Never do these activities if you are dizzy.    Lifestyle     Do not drink alcohol until your health care provider approves, and avoid drug use. Alcohol and certain drugs may slow your recovery and can put you at risk of further injury.  If it is harder than usual to remember things, write them down.  If you are easily distracted, try to do one thing at a time.  Talk with family members or close friends when making important decisions.  Tell your friends, family, a trusted colleague, and  about your injury, symptoms, and restrictions. Have them watch for any new or worsening problems.  General instructions     Take over-the-counter and prescription medicines only as told by your health care provider.  Have someone stay with you for 24 hours after your head injury. This person should watch you for any changes in your symptoms and be ready to seek medical help, as needed.  Keep all follow-up visits as told by your health care provider. This is important.    How is this prevented?  Work on improving your balance and strength to avoid falls.  Wear a seatbelt when you are in a moving vehicle.  Wear a helmet when riding a bicycle, skiing, or doing any other sport or activity that has a risk of injury.  Drink alcohol only in moderation.  Take safety measures in your home, such as:  Removing clutter and tripping hazards from floors and stairways.  Using grab bars in bathrooms and handrails by stairs.  Placing non-slip mats on floors and in bathtubs.  Improving lighting in dim areas.    Get help right away if:  You have:  A severe headache that is not helped by medicine.  Trouble walking, have weakness in your arms and legs, or lose your balance.  Clear or bloody fluid coming from your nose or ears.  Changes in your vision.  A seizure.  You vomit.  Your symptoms get worse.  Your speech is slurred.  You pass out.  You are sleepier and have trouble staying awake.  Your pupils change size.  These symptoms may represent a serious problem that is an emergency. Do not wait to see if the symptoms will go away. Get medical help right away. Call your local emergency services (911 in the [place] Do not drive yourself to the hospital.     Post-Concussion Syndrome    A concussion is a brain injury from a direct hit (blow) to your head or body. This blow causes your brain to shake quickly back and forth inside your skull. This can damage brain cells and cause chemical changes in your brain. Concussions are usually not life-threatening but can cause several serious symptoms.    Post-concussion syndrome is when symptoms that occur after a concussion last longer than normal. These symptoms can last from weeks to months.    What are the causes?  The cause of this condition is not known. It can happen whether your head injury was mild or severe.    What increases the risk?  You are more likely to develop this condition if:  You are female.  You are a child, teen, or young adult.  You had a past head injury.  You have a history of headaches.  You have depression or anxiety.    What are the signs or symptoms?    Physical symptoms   Headaches.  Tiredness.  Dizziness.  Weakness.  Blurry vision.  Sensitivity to light.  Hearing difficulties.  Mental and emotional symptoms     Memory difficulties.  Difficulty with concentration.  Difficulty sleeping or staying asleep.  Feeling irritable.  Anxiety or depression.  Difficulty learning new things.    How is this diagnosed?  This condition may be diagnosed based on:  Your symptoms.  A description of your injury.  Your medical history.  Your health care provider may order other tests such as:  Brain function tests (neurological testing).  CT scan.    How is this treated?  Treatment for this condition may depend on your symptoms. Symptoms usually go away on their own over time. Treatments may include:  Medicines for headaches.  Resting your brain and body for a few days after your injury.  Rehabilitation therapy, such as:  Physical or occupational therapy. This may include exercises to help with balance and dizziness.  Mental health counseling.  Speech therapy.  Vision therapy. A brain and eye specialist can recommend treatments for vision problems.    Follow these instructions at home:  Medicines     Take over-the-counter and prescription medicines only as told by your health care provider.  Avoid opioid prescription pain medicines when recovering from a concussion.  Activity     Limit your mental activities for the first few days after your injury, such as:  Homework or job-related work.  Complex thinking.  Watching TV, and using a computer or phone.  Playing memory games and puzzles.  Gradually return to your normal activity level. If a certain activity brings on your symptoms, stop or slow down until you can do the activity without it triggering your symptoms.  Limit physical activity, such as exercise or sports, for the first few days after a concussion. Gradually return to normal activity as told by your health care provider.  If a certain activity brings on your symptoms, stop or slow down until you can do the activity without it triggering your symptoms.  Rest. Rest helps your brain heal. Make sure you:  Get plenty of sleep at night. Most adults should get at least 7–9 hours of sleep each night.  Rest during the day. Take naps or rest breaks when you feel tired.  Do not do high-risk activities that could cause a second concussion, such as riding a bike or playing sports. Having another concussion before the first one has healed can be dangerous.    General instructions   Do not drink alcohol until your health care provider says you can.  Keep track of the frequency and the severity of your symptoms. Give this information to your health care provider.  Keep all follow-up visits as directed by your health care provider. This is important.  Contact a health care provider if:  Your symptoms do not improve.  You have another injury.  Get help right away if you:  Have a severe or worsening headache.  Are confused.  Have trouble staying awake.  Pass out.  Vomit.  Have weakness or numbness in any part of your body.  Have a seizure.  Have trouble speaking.  Summary  Post-concussion syndrome is when symptoms that occur after a concussion last longer than normal.  Symptoms usually go away on their own over time. Depending on your symptoms, you may need treatment, such as medicines or rehabilitation therapy.  Rest your brain and body for a few days after your injury. Gradually return to activities, as told by your health care provider.  Get plenty of sleep, and avoid alcohol and opioid pain medicines while recovering from a concussion.

## 2023-06-21 NOTE — CONSULT NOTE ADULT - SUBJECTIVE AND OBJECTIVE BOX
Electrophysiology Consult Note:     CHIEF COMPLAINT:  Patient is a 32y old  Female who presents with a chief complaint of       HISTORY OF PRESENT ILLNESS:   HPI:  31 yo F with history of prolong QT,  Anxiety, thrombocytopenia, syncope presents to ED for evaluation of syncope with head trauma.	Pt was started on rituxmab for recurrent thrombocytopenia ~1w ago. Since then has felt intermittent lightheaded, mostly w/ standing. Last night ~2300 she was standing in her apartment and she next remembers waking up on the floor (last night). Sustained ecchymosis to R shoulder and R forehead.   Patient extensive work up on last admission, MRI and CTs were negative, she also  had outpatient telemetry ,with night time HB likely vagal etiology.   EPS called to evaluate , for syncope      PAST MEDICAL & SURGICAL HISTORY:  ADHD      Anxiety      COVID-19      History of thrombocytopenia      No significant past surgical history          FAMILY HISTORY:  Family history of cardiomegaly (Aunt)        SOCIAL HISTORY:    [x ] Non-smoker      Allergies    No Known Allergies    Intolerances    	    MEDICATIONS:  MEDICATIONS  (STANDING):    MEDICATIONS  (PRN):        REVIEW OF SYSTEMS:  CONSTITUTIONAL: No fever, weight loss, or fatigue  EYES: No eye pain, visual disturbances, or discharge  ENMT:  No difficulty hearing, tinnitus, vertigo; No sinus or throat pain  NECK: No pain or stiffness  BREASTS: No pain, masses, or nipple discharge  RESPIRATORY: No cough, wheezing, chills or hemoptysis; No Shortness of Breath  CARDIOVASCULAR: No chest pain, palpitations, dizziness, or leg swelling  GASTROINTESTINAL: No abdominal or epigastric pain. No nausea, vomiting, or hematemesis; No diarrhea or constipation.       PHYSICAL EXAM:  Vital Signs Last 24 Hrs  T(C): 36.7 (21 Jun 2023 14:38), Max: 36.9 (21 Jun 2023 09:37)  T(F): 98.1 (21 Jun 2023 14:38), Max: 98.4 (21 Jun 2023 09:37)  HR: 80 (21 Jun 2023 14:38) (80 - 100)  BP: 84/45 (21 Jun 2023 14:38) (84/45 - 129/89)  BP(mean): --  RR: 18 (21 Jun 2023 14:38) (18 - 18)  SpO2: 95% (21 Jun 2023 14:38) (95% - 98%)    Parameters below as of 21 Jun 2023 14:38  Patient On (Oxygen Delivery Method): room air      Daily Height in cm: 167.64 (21 Jun 2023 09:37)    Daily     Constitutional: NAD	  HEENT:   PERRL, EOMI	  CVS:  S1 S2, No JVD,  No edema  Pulm: Lungs clear to auscultation	  GI:  Soft, Non-tender, + BS	  Ext: No LE edema  Neurologic: A&O x 3  Skin: No rash or lesion       	  LABS:	                         12.1   10.81 )-----------( 49       ( 21 Jun 2023 10:08 )             33.7     06-21    138  |  99  |  7   ----------------------------<  85  4.3   |  27  |  0.53    Ca    8.4      21 Jun 2023 10:08    TPro  6.3  /  Alb  3.8  /  TBili  0.2  /  DBili  x   /  AST  22  /  ALT  29  /  AlkPhos  62  06-21    proBNP:   Lipid Profile:   HgA1c:   TSH: 	      EKG: < from: 12 Lead ECG (04.07.23 @ 06:51) >  Ventricular Rate 76 BPM    Atrial Rate 76 BPM    P-R Interval 172 ms    QRS Duration 82 ms    Q-T Interval 338 ms    QTC Calculation(Bazett) 380 ms    P Axis 44 degrees    R Axis 65 degrees    T Axis -45 degrees    Diagnosis Line Normal sinus rhythm  ST - T  abnormalities        Echo: < from: TTE Echo Complete w/o Contrast w/ Doppler (04.07.23 @ 10:20) >   1. Normal left and right ventricular cavity size and systolic function.   2. No significant valvular disease.   3. No evidence of pulmonary hypertension.   4. No pericardial effusion.

## 2023-06-21 NOTE — ED ADULT NURSE NOTE - NSFALLHARMRISKINTERV_ED_ALL_ED

## 2023-06-21 NOTE — ED PROVIDER NOTE - CLINICAL SUMMARY MEDICAL DECISION MAKING FREE TEXT BOX
32F PMH Anxiety, thrombocytopenia p/w LOC/head trauma.   FMHx prolonged QTC, mom has AICD.   Pt was started on rituxmab for recurrent thrombocytopenia ~1w ago. Since then has felt intermittent lightheaded, mostly w/ standing. Last night ~2300 she was standing in her apartment and she next remembers waking up on the floor (last night). Sustained ecchymosis to R shoulder and R forehead. Hs minimal pain to R shoulder. Has no pain to head, unless she palpates it. No current lightheadedness.   Pt had presented ~2.5 months ago w/ syncope/LOC. Had +d-dimer and trop. EKG w/ LVH/diffuse STD. Had extensive w/u including CTH, CTA chest, TTE, CCTA, cardiac MRI, exercise stress test, all w/ no significant findings. Had labs showing thrombocytopenia, heme felt likely immune mediated. Pt states that she had worn monitor that showed she has intermittent Wenckebach when she sleeps and that Dr. Landa was thinking about possible additional work up or intervention if she were to pass out again  Borderline triage tachycardia self resolved, other vitals wnl. Exam as above.   ddx: Syncope, low suspicion but possible cardiac origin.   Subsequent mild head trauma - but is likely thrombocytopenic. Clinically no shoulder fx/dislocation or significant hematoma.   Labs, CTH, IVF, reassess.

## 2023-06-21 NOTE — ED PROVIDER NOTE - OBJECTIVE STATEMENT
32F PMH Anxiety, thrombocytopenia p/w LOC/head trauma.   FMHx prolonged QTC, mom has AICD.   Pt was started on rituxmab for recurrent thrombocytopenia ~1w ago. Since then has felt intermittent lightheaded, mostly w/ standing. Last night ~2300 she was standing in her apartment and she next remembers waking up on the floor (last night). Sustained ecchymosis to R shoulder and R forehead. Hs minimal pain to R shoulder. Has no pain to head, unless she palpates it. No current lightheadedness.   Denies bleeding from anywhere - no gum bleeding, vaginal bleeding, black/bloody stool. LMP end of May.   Pt had presented ~2.5 months ago w/ syncope/LOC. Had +d-dimer and trop. EKG w/ LVH/diffuse STD. Had extensive w/u including CTH, CTA chest, TTE, CCTA, cardiac MRI, exercise stress test, all w/ no significant findings. Had labs showing thrombocytopenia, heme felt likely immune mediated. Pt states that she had worn monitor that showed she has intermittent Wenckebach when she sleeps and that Dr. Landa was thinking about possible additional work up or intervention if she were to pass out again.   Denies f/c, SOB/CP, palpitations, diaphoresis, NVD, abd pain, URI symptoms, urinary complaints, black/bloody stool, focal weakness/numbness, recent travel/immobilization. Denies other trauma from fall - no other MSK/back pain.   Meds: Adderall, Dexamethasone, Lexapro, Lo Loestrin Fe

## 2023-06-21 NOTE — ED PROVIDER NOTE - PROGRESS NOTE DETAILS
[place]: WBC improved to 10.81, plt mildly improved to 49, other labs grossly wnl. CTH w/ no acute pathology. During orthostatics, pt's BP was low entire time, no actual significant change w/ position. Received IVF. Currently pt is asymptomatic. EP consulted ~1045, awaiting eval. Updated pt/mom at bedside. [place]: Pt sleeping comfortably, rediscussed w/ EP who will eval pt soon. Updated mom at bedside. [place]: Pt remains asymptomatic while in ED. Evaluated by EP, was considering loop recorder but unable to arrange for today. Pt's BP still soft - states her baseline SBP is . Given food. Will reassess. [place]: BP improving. In ED for >7hrs and remains well appearing and asymptomatic. Discussed importance of outpt follow up and return precautions. Clinically no indication for further emergent ED workup or hospitalization at this time. Stable for dc, outpt f/u.

## 2023-06-22 PROBLEM — Z86.2 PERSONAL HISTORY OF DISEASES OF THE BLOOD AND BLOOD-FORMING ORGANS AND CERTAIN DISORDERS INVOLVING THE IMMUNE MECHANISM: Chronic | Status: ACTIVE | Noted: 2023-06-21

## 2023-06-22 NOTE — PHARMACOTHERAPY INTERVENTION NOTE - COMMENTS
Patient currently on Truxima treatment. HBV Panel missing the following parameters:  1. Hepatitis B Surface Antibody  2. Hepatitis B Core Antibody, Total  Labs were ordered by Rebeca Mera to complete the patient's HBV reactivation screening.  Patient currently on Truxima treatment. HBV Panel missing the following parameters:  1. Hepatitis B Surface Antibody  2. Hepatitis B Core Antibody, Total  Recommended labs were ordered by Rebeca Mera to complete the patient's HBV reactivation screening.

## 2023-06-23 ENCOUNTER — OUTPATIENT (OUTPATIENT)
Dept: OUTPATIENT SERVICES | Facility: HOSPITAL | Age: 33
LOS: 1 days | End: 2023-06-23
Payer: COMMERCIAL

## 2023-06-23 ENCOUNTER — APPOINTMENT (OUTPATIENT)
Dept: HEMATOLOGY ONCOLOGY | Facility: CLINIC | Age: 33
End: 2023-06-23
Payer: COMMERCIAL

## 2023-06-23 ENCOUNTER — APPOINTMENT (OUTPATIENT)
Dept: INFUSION THERAPY | Facility: CLINIC | Age: 33
End: 2023-06-23

## 2023-06-23 VITALS
TEMPERATURE: 97 F | HEIGHT: 66 IN | HEART RATE: 78 BPM | OXYGEN SATURATION: 98 % | RESPIRATION RATE: 18 BRPM | SYSTOLIC BLOOD PRESSURE: 95 MMHG | WEIGHT: 132.06 LBS | DIASTOLIC BLOOD PRESSURE: 61 MMHG

## 2023-06-23 VITALS
SYSTOLIC BLOOD PRESSURE: 99 MMHG | TEMPERATURE: 98 F | DIASTOLIC BLOOD PRESSURE: 70 MMHG | RESPIRATION RATE: 18 BRPM | HEART RATE: 78 BPM | OXYGEN SATURATION: 99 %

## 2023-06-23 VITALS — BODY MASS INDEX: 23.32 KG/M2 | HEIGHT: 65 IN | WEIGHT: 140 LBS

## 2023-06-23 DIAGNOSIS — D69.3 IMMUNE THROMBOCYTOPENIC PURPURA: ICD-10-CM

## 2023-06-23 LAB
BASOPHILS # BLD AUTO: 0.05 K/UL — SIGNIFICANT CHANGE UP (ref 0–0.2)
BASOPHILS NFR BLD AUTO: 0.9 % — SIGNIFICANT CHANGE UP (ref 0–2)
EOSINOPHIL # BLD AUTO: 0 K/UL — SIGNIFICANT CHANGE UP (ref 0–0.5)
EOSINOPHIL NFR BLD AUTO: 0 % — SIGNIFICANT CHANGE UP (ref 0–6)
GIANT PLATELETS BLD QL SMEAR: PRESENT — SIGNIFICANT CHANGE UP
HBV CORE AB SER-ACNC: SIGNIFICANT CHANGE UP
HBV SURFACE AB SER-ACNC: REACTIVE
HCT VFR BLD CALC: 28.5 % — LOW (ref 34.5–45)
HGB BLD-MCNC: 9.6 G/DL — LOW (ref 11.5–15.5)
HYPOCHROMIA BLD QL: SLIGHT — SIGNIFICANT CHANGE UP
LYMPHOCYTES # BLD AUTO: 2.16 K/UL — SIGNIFICANT CHANGE UP (ref 1–3.3)
LYMPHOCYTES # BLD AUTO: 38.2 % — SIGNIFICANT CHANGE UP (ref 13–44)
MACROCYTES BLD QL: SLIGHT — SIGNIFICANT CHANGE UP
MANUAL SMEAR VERIFICATION: SIGNIFICANT CHANGE UP
MCHC RBC-ENTMCNC: 30.9 PG — SIGNIFICANT CHANGE UP (ref 27–34)
MCHC RBC-ENTMCNC: 33.7 GM/DL — SIGNIFICANT CHANGE UP (ref 32–36)
MCV RBC AUTO: 91.6 FL — SIGNIFICANT CHANGE UP (ref 80–100)
MICROCYTES BLD QL: SLIGHT — SIGNIFICANT CHANGE UP
MONOCYTES # BLD AUTO: 0.49 K/UL — SIGNIFICANT CHANGE UP (ref 0–0.9)
MONOCYTES NFR BLD AUTO: 8.7 % — SIGNIFICANT CHANGE UP (ref 2–14)
MYELOCYTES NFR BLD: 0.9 % — HIGH (ref 0–0)
NEUTROPHILS # BLD AUTO: 2.9 K/UL — SIGNIFICANT CHANGE UP (ref 1.8–7.4)
NEUTROPHILS NFR BLD AUTO: 51.3 % — SIGNIFICANT CHANGE UP (ref 43–77)
OVALOCYTES BLD QL SMEAR: SLIGHT — SIGNIFICANT CHANGE UP
PLAT MORPH BLD: NORMAL — SIGNIFICANT CHANGE UP
PLATELET # BLD AUTO: 93 K/UL — LOW (ref 150–400)
POIKILOCYTOSIS BLD QL AUTO: SLIGHT — SIGNIFICANT CHANGE UP
POLYCHROMASIA BLD QL SMEAR: SLIGHT — SIGNIFICANT CHANGE UP
RBC # BLD: 3.11 M/UL — LOW (ref 3.8–5.2)
RBC # FLD: 12.7 % — SIGNIFICANT CHANGE UP (ref 10.3–14.5)
RBC BLD AUTO: ABNORMAL
SPHEROCYTES BLD QL SMEAR: SLIGHT — SIGNIFICANT CHANGE UP
WBC # BLD: 5.66 K/UL — SIGNIFICANT CHANGE UP (ref 3.8–10.5)
WBC # FLD AUTO: 5.66 K/UL — SIGNIFICANT CHANGE UP (ref 3.8–10.5)

## 2023-06-23 PROCEDURE — 85025 COMPLETE CBC W/AUTO DIFF WBC: CPT

## 2023-06-23 PROCEDURE — 99214 OFFICE O/P EST MOD 30 MIN: CPT

## 2023-06-23 PROCEDURE — 86704 HEP B CORE ANTIBODY TOTAL: CPT

## 2023-06-23 PROCEDURE — 96375 TX/PRO/DX INJ NEW DRUG ADDON: CPT

## 2023-06-23 PROCEDURE — 36415 COLL VENOUS BLD VENIPUNCTURE: CPT

## 2023-06-23 PROCEDURE — 86706 HEP B SURFACE ANTIBODY: CPT

## 2023-06-23 PROCEDURE — 96415 CHEMO IV INFUSION ADDL HR: CPT

## 2023-06-23 PROCEDURE — 96413 CHEMO IV INFUSION 1 HR: CPT

## 2023-06-23 RX ORDER — DEXAMETHASONE 0.5 MG/5ML
10 ELIXIR ORAL ONCE
Refills: 0 | Status: COMPLETED | OUTPATIENT
Start: 2023-06-23 | End: 2023-06-23

## 2023-06-23 RX ORDER — DIPHENHYDRAMINE HCL 50 MG
50 CAPSULE ORAL ONCE
Refills: 0 | Status: COMPLETED | OUTPATIENT
Start: 2023-06-23 | End: 2023-06-23

## 2023-06-23 RX ORDER — DEXAMETHASONE 4 MG/1
4 TABLET ORAL DAILY
Qty: 40 | Refills: 0 | Status: DISCONTINUED | COMMUNITY
Start: 2023-06-08 | End: 2023-06-23

## 2023-06-23 RX ORDER — ACETAMINOPHEN 500 MG
650 TABLET ORAL ONCE
Refills: 0 | Status: COMPLETED | OUTPATIENT
Start: 2023-06-23 | End: 2023-06-23

## 2023-06-23 RX ORDER — RITUXIMAB 10 MG/ML
620 INJECTION, SOLUTION INTRAVENOUS ONCE
Refills: 0 | Status: COMPLETED | OUTPATIENT
Start: 2023-06-23 | End: 2023-06-23

## 2023-06-23 RX ADMIN — RITUXIMAB 620 MILLIGRAM(S): 10 INJECTION, SOLUTION INTRAVENOUS at 15:00

## 2023-06-23 RX ADMIN — Medication 204 MILLIGRAM(S): at 08:40

## 2023-06-23 RX ADMIN — Medication 650 MILLIGRAM(S): at 08:10

## 2023-06-23 RX ADMIN — Medication 10 MILLIGRAM(S): at 08:40

## 2023-06-23 RX ADMIN — RITUXIMAB 620 MILLIGRAM(S): 10 INJECTION, SOLUTION INTRAVENOUS at 08:59

## 2023-06-23 RX ADMIN — Medication 50 MILLIGRAM(S): at 08:55

## 2023-06-23 RX ADMIN — Medication 650 MILLIGRAM(S): at 08:25

## 2023-06-23 RX ADMIN — Medication 204 MILLIGRAM(S): at 08:25

## 2023-06-23 NOTE — HISTORY OF PRESENT ILLNESS
[de-identified] : Aline Serra is a 32 year old  female presents to clinic for follow up of ITP\par \par Hematology history:\par She presented to [facility] on 4/6/23 after syncopal episode.  In ED, she was found to have low platelets, 30K which then trended down into the 20's.  Cardiac workup showed mild troponin elevation but echo, cardiac MRI all unremarkable.  She was treated with dexamethasone 40mg QD x 4 days for presumptive diagnosis of ITP and  platelet count improved to normal.  \par \par workup while hospitalized included viral studies - HIV, hep B and C serologies - all negative.  Abd US unremarkable.  RONALD normal.  B12/folate normal.  peripheral blood flow cytometry normal.  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 no family history of blood disease \par SH: never a smoker, social alcohol use, works as an \par   [de-identified] : She presents to clinic with her mother.  She received rituximab 6/15/23.  was very tired afterward.  had nausea the day after treatment.  took zofran w/ relief.  Did not need to take zofran thereafter.  had to be evaluated in ER 6/21/23 due to syncopal epsiode at home in the early morning hours.  Stood up out of bed, became lightheaded, fainted, hit forehead and R shoulder.  large bruises both locations.  to ER.  platelet count 49.  head CT negative.  saw cardiology - plan for implanted loop recorder.  Denies poor PO intake the day prior, denies vomiting the day prior, denies use of zofran the day prior.\par \par denies HA today.

## 2023-06-23 NOTE — ASSESSMENT
[FreeTextEntry1] : Aline Serra is a 32 year old female who was admitted to [facility] 4/2023 with syncope and found to have ITP.  Platelet count responded to high dose dexamethasone x 4 days.  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  Poor response to steroids.  started rituximab 6/15/23\par \par Plan:\par 1.  immune thrombocytopenia\par --presents for dose #2 of rituximab\par --CBC today reviewed- platelet count up to 93 which is wonderful!  \par --unfortunately for unclear reasons her hemoglobin is lower at 9.  Rituximab effect?  repeat CBC in 1 week.\par --continue weekly rituximab - planning for total 4 doses.\par --bleeding precautions discussed\par \par 2.  syncope\par --ITP not the cause\par --needs f/u with cardiology\par --has an Rx for zofran, will use sparingly to avoid issues w/ QT prolongation.  \par \par RTC for office visit prior to 4th dose of rituximab treatment \par with CBC

## 2023-06-23 NOTE — PHYSICAL EXAM
[Normal] : affect appropriate [Restricted in physically strenuous activity but ambulatory and able to carry out work of a light or sedentary nature] : Status 1- Restricted in physically strenuous activity but ambulatory and able to carry out work of a light or sedentary nature, e.g., light house work, office work [de-identified] : bruise on forehead.  bruise on R shoulder.

## 2023-06-23 NOTE — REVIEW OF SYSTEMS
[Fever] : no fever [Chills] : no chills [Night Sweats] : night sweats [Fatigue] : fatigue [Recent Change In Weight] : ~T no recent weight change [Dysphagia] : no dysphagia [Loss of Hearing] : no loss of hearing [Nosebleeds] : no nosebleeds [Hoarseness] : no hoarseness [Odynophagia] : no odynophagia [Mucosal Pain] : no mucosal pain [Suicidal] : not suicidal [Insomnia] : no insomnia [Anxiety] : anxiety [Depression] : no depression [Easy Bleeding] : no tendency for easy bleeding [Easy Bruising] : a tendency for easy bruising [Swollen Glands] : no swollen glands [Negative] : Endocrine [FreeTextEntry2] : rare drenching night sweats, wakes with mild sweating frequently [FreeTextEntry4] : had nose bleeding when blowing nose  [de-identified] : petechia, bruising on lower back after fall from the bed at night, no injury [FreeTextEntry1] : hx of cold induced urticaria

## 2023-06-26 NOTE — ADDENDUM
[FreeTextEntry1] : I, Jordon Landa, hereby attest that the medical record entry for this patient accurately reflects signatures/notations that I made on the Date of Service in my capacity as an Attending Physician when I treated/diagnosed the above patient. I do hereby attest that this information is true, accurate and complete to the best of my knowledge and I understand that any falsification, omission, or concealment of material fact may subject me to administrative, civil, or, criminal liability. I agree with the note as written by my PA in its entirety.\par I was present for the entire visit and supervised the entire visit and agree with the plan as outlined.\par \par \par I, Tre Fortune, am scribing for and the presence of Dr. Landa the following sections: HPI, PMH,Family/social history, ROS, Physical Exam, Assessment / Plan.\par

## 2023-06-26 NOTE — DISCUSSION/SUMMARY
LM on pt vm to call back regarding test results.  [EKG obtained to assist in diagnosis and management of assessed problem(s)] : EKG obtained to assist in diagnosis and management of assessed problem(s) [FreeTextEntry1] : 32 year old female with family history of prolonged QTC and history of Anxiety, who was admitted to [facility] 4/2023 s/p syncope and now presents for follow up.  She has had one episode of syncope with an extensive work up in the hospital (echo, stress, MRi, CTa) that were all unremarkable.  The story of her syncope sounds vagal in nature.  Since the hospital she wore an event monitor with heart block during sleeping hours which also is vagal in nature.  We discussed that given her history and the way these episodes looked on the monitor the syncope and night time heart block are likely unrelated.  We discussed if they were related we would proceed with a permanent pacemaker.  Given her young age, history and that this was her first episode of syncope we decided not to proceed with a pacemaker after discussing risks and benefits.  We discussed long term monitoring with a loop recorder which we will defer at this time given that this was her first syncopal episode.  She knows to stay well hydrated, avoid rapid changes in posture, liberalize her salt intake and if she becomes lightheaded to go into a safe position.  She will follow up in 6-8 weeks or sooner if needed and knows to call with any questions or concerns. If she has recurrent syncope, we would likely advise an implantable loop recorder given the findings to date.

## 2023-06-26 NOTE — HISTORY OF PRESENT ILLNESS
[FreeTextEntry1] : 32 year old female with family history of prolonged QTC and hisotry of Anxiety, who was admitted to [facility] 4/2023 s/p syncope and now presents for follow up,\par \par She was admitted to [facility] 4/2023 s/p syncope.  She was climbing 6 flights of stairs while carrying laundry and felt SOB, followed by loss of consciousness and striking head. In ED, VSS, Labs significant for Trop I 9153 > 35323, Ddimer 427, WBC 14.64 w/ left shift and PLT 30K. EKG SR w/ LVH and diffuse STD. CTH unremarkable and CTA chest negative for PE. She was transferred to [facility];  Troponin trended down.  Telemetry unremarkable.  Echo  revealed normal LVEF w/o valvular disease and CCTA revealed Ca score 0 and normal coronaries. \par Hospital course significant for Thrombocytopenia,  Heme/Onc consulted and felt likely idiopathic \par thrombocytopenia.  She has hematology follow up.\par \par Cardiac MRI performed showing \par 1. There is no convincing focus of increased T2 signal to suggest myocardial edema. \par 2.  No global or segmental wall motion abnormality. \par 3.  At the inferoseptal wall at the mid cavity is a subtle linear focus of increased signal on the late gadolinium enhanced sequences. Remainder of the late gadolinium sequences are normal. \par 4. There is finding of uncertain etiology (? Secondary to averaging along the course of a conspicuous septal division and a subtle myocardial channels along the anteroseptal wall). \par \par \par Patient had Exercise Stress Test to look for arrhythmias which showed revealing PAC and PVCS post stress, no other arrhythmia noted. \par \par \par She was discharged with no further syncope.  No syncope prior to this.  She has had hematology follow up.  She wore an event monitor with transient, asymptomatic heart block at night times only.  Normal QRS throughout the monitoring period.  Heart rates 39- bpm.  No chest pain, near syncope, palpitations, orthopnea, PND or edema.  She has had no further syncope since discharge from the hospital.\par

## 2023-06-26 NOTE — REVIEW OF SYSTEMS
[Negative] : Heme/Lymph [Fever] : no fever [Chills] : no chills [Feeling Fatigued] : not feeling fatigued [SOB] : no shortness of breath [Dyspnea on exertion] : not dyspnea during exertion [Chest Discomfort] : no chest discomfort [Palpitations] : no palpitations [Orthopnea] : no orthopnea

## 2023-06-30 ENCOUNTER — APPOINTMENT (OUTPATIENT)
Dept: INFUSION THERAPY | Facility: CLINIC | Age: 33
End: 2023-06-30

## 2023-06-30 ENCOUNTER — OUTPATIENT (OUTPATIENT)
Dept: OUTPATIENT SERVICES | Facility: HOSPITAL | Age: 33
LOS: 1 days | End: 2023-06-30
Payer: COMMERCIAL

## 2023-06-30 VITALS
DIASTOLIC BLOOD PRESSURE: 62 MMHG | TEMPERATURE: 97 F | SYSTOLIC BLOOD PRESSURE: 101 MMHG | HEIGHT: 66 IN | WEIGHT: 132.06 LBS | RESPIRATION RATE: 18 BRPM | OXYGEN SATURATION: 97 % | HEART RATE: 84 BPM

## 2023-06-30 VITALS
DIASTOLIC BLOOD PRESSURE: 74 MMHG | TEMPERATURE: 98 F | OXYGEN SATURATION: 98 % | HEART RATE: 80 BPM | RESPIRATION RATE: 18 BRPM | SYSTOLIC BLOOD PRESSURE: 105 MMHG

## 2023-06-30 DIAGNOSIS — D69.3 IMMUNE THROMBOCYTOPENIC PURPURA: ICD-10-CM

## 2023-06-30 LAB
HCT VFR BLD CALC: 33 % — LOW (ref 34.5–45)
HGB BLD-MCNC: 11.7 G/DL — SIGNIFICANT CHANGE UP (ref 11.5–15.5)
LYMPHOCYTES # BLD AUTO: 2.8 K/UL — SIGNIFICANT CHANGE UP (ref 1–3.3)
LYMPHOCYTES # BLD AUTO: 37.7 % — SIGNIFICANT CHANGE UP (ref 13–44)
MCHC RBC-ENTMCNC: 31.8 PG — SIGNIFICANT CHANGE UP (ref 27–34)
MCHC RBC-ENTMCNC: 35.5 GM/DL — SIGNIFICANT CHANGE UP (ref 32–36)
MCV RBC AUTO: 89.7 FL — SIGNIFICANT CHANGE UP (ref 80–100)
NEUTROPHILS # BLD AUTO: 4.1 K/UL — SIGNIFICANT CHANGE UP (ref 1.8–7.4)
NEUTROPHILS NFR BLD AUTO: 55.5 % — SIGNIFICANT CHANGE UP (ref 43–77)
PLATELET # BLD AUTO: 335 K/UL — SIGNIFICANT CHANGE UP (ref 150–400)
RBC # BLD: 3.68 M/UL — LOW (ref 3.8–5.2)
RBC # FLD: 13.3 % — SIGNIFICANT CHANGE UP (ref 10.3–14.5)
WBC # BLD: 7.4 K/UL — SIGNIFICANT CHANGE UP (ref 3.8–10.5)
WBC # FLD AUTO: 7.4 K/UL — SIGNIFICANT CHANGE UP (ref 3.8–10.5)

## 2023-06-30 PROCEDURE — 96375 TX/PRO/DX INJ NEW DRUG ADDON: CPT

## 2023-06-30 PROCEDURE — 85025 COMPLETE CBC W/AUTO DIFF WBC: CPT

## 2023-06-30 PROCEDURE — 96413 CHEMO IV INFUSION 1 HR: CPT

## 2023-06-30 PROCEDURE — 36415 COLL VENOUS BLD VENIPUNCTURE: CPT

## 2023-06-30 PROCEDURE — 96415 CHEMO IV INFUSION ADDL HR: CPT

## 2023-06-30 RX ORDER — ACETAMINOPHEN 500 MG
650 TABLET ORAL ONCE
Refills: 0 | Status: COMPLETED | OUTPATIENT
Start: 2023-06-30 | End: 2023-06-30

## 2023-06-30 RX ORDER — DEXAMETHASONE 0.5 MG/5ML
10 ELIXIR ORAL ONCE
Refills: 0 | Status: COMPLETED | OUTPATIENT
Start: 2023-06-30 | End: 2023-06-30

## 2023-06-30 RX ORDER — RITUXIMAB 10 MG/ML
620 INJECTION, SOLUTION INTRAVENOUS ONCE
Refills: 0 | Status: COMPLETED | OUTPATIENT
Start: 2023-06-30 | End: 2023-06-30

## 2023-06-30 RX ORDER — DIPHENHYDRAMINE HCL 50 MG
50 CAPSULE ORAL ONCE
Refills: 0 | Status: COMPLETED | OUTPATIENT
Start: 2023-06-30 | End: 2023-06-30

## 2023-06-30 RX ADMIN — Medication 50 MILLIGRAM(S): at 09:40

## 2023-06-30 RX ADMIN — Medication 650 MILLIGRAM(S): at 08:55

## 2023-06-30 RX ADMIN — Medication 204 MILLIGRAM(S): at 09:10

## 2023-06-30 RX ADMIN — RITUXIMAB 620 MILLIGRAM(S): 10 INJECTION, SOLUTION INTRAVENOUS at 14:10

## 2023-06-30 RX ADMIN — Medication 650 MILLIGRAM(S): at 09:25

## 2023-06-30 RX ADMIN — Medication 204 MILLIGRAM(S): at 09:25

## 2023-06-30 RX ADMIN — Medication 10 MILLIGRAM(S): at 09:25

## 2023-06-30 RX ADMIN — RITUXIMAB 620 MILLIGRAM(S): 10 INJECTION, SOLUTION INTRAVENOUS at 09:26

## 2023-07-07 ENCOUNTER — APPOINTMENT (OUTPATIENT)
Dept: INFUSION THERAPY | Facility: CLINIC | Age: 33
End: 2023-07-07

## 2023-07-07 ENCOUNTER — OUTPATIENT (OUTPATIENT)
Dept: OUTPATIENT SERVICES | Facility: HOSPITAL | Age: 33
LOS: 1 days | End: 2023-07-07
Payer: COMMERCIAL

## 2023-07-07 ENCOUNTER — LABORATORY RESULT (OUTPATIENT)
Age: 33
End: 2023-07-07

## 2023-07-07 ENCOUNTER — APPOINTMENT (OUTPATIENT)
Dept: HEMATOLOGY ONCOLOGY | Facility: CLINIC | Age: 33
End: 2023-07-07
Payer: COMMERCIAL

## 2023-07-07 VITALS
TEMPERATURE: 97.3 F | DIASTOLIC BLOOD PRESSURE: 66 MMHG | HEIGHT: 65 IN | HEART RATE: 94 BPM | OXYGEN SATURATION: 98 % | RESPIRATION RATE: 18 BRPM | WEIGHT: 134 LBS | SYSTOLIC BLOOD PRESSURE: 91 MMHG | BODY MASS INDEX: 22.33 KG/M2

## 2023-07-07 VITALS
TEMPERATURE: 97 F | SYSTOLIC BLOOD PRESSURE: 91 MMHG | HEIGHT: 60.6 IN | RESPIRATION RATE: 18 BRPM | WEIGHT: 134.04 LBS | DIASTOLIC BLOOD PRESSURE: 66 MMHG | OXYGEN SATURATION: 98 % | HEART RATE: 94 BPM

## 2023-07-07 VITALS
SYSTOLIC BLOOD PRESSURE: 100 MMHG | HEART RATE: 81 BPM | TEMPERATURE: 98 F | RESPIRATION RATE: 18 BRPM | OXYGEN SATURATION: 97 % | DIASTOLIC BLOOD PRESSURE: 65 MMHG

## 2023-07-07 DIAGNOSIS — I95.9 HYPOTENSION, UNSPECIFIED: ICD-10-CM

## 2023-07-07 DIAGNOSIS — D69.3 IMMUNE THROMBOCYTOPENIC PURPURA: ICD-10-CM

## 2023-07-07 PROCEDURE — 96415 CHEMO IV INFUSION ADDL HR: CPT

## 2023-07-07 PROCEDURE — 96413 CHEMO IV INFUSION 1 HR: CPT

## 2023-07-07 PROCEDURE — 99213 OFFICE O/P EST LOW 20 MIN: CPT | Mod: 25

## 2023-07-07 PROCEDURE — 96375 TX/PRO/DX INJ NEW DRUG ADDON: CPT

## 2023-07-07 PROCEDURE — 36415 COLL VENOUS BLD VENIPUNCTURE: CPT

## 2023-07-07 RX ORDER — ACETAMINOPHEN 500 MG
650 TABLET ORAL ONCE
Refills: 0 | Status: COMPLETED | OUTPATIENT
Start: 2023-07-07 | End: 2023-07-07

## 2023-07-07 RX ORDER — DEXAMETHASONE 0.5 MG/5ML
10 ELIXIR ORAL ONCE
Refills: 0 | Status: COMPLETED | OUTPATIENT
Start: 2023-07-07 | End: 2023-07-07

## 2023-07-07 RX ORDER — RITUXIMAB 10 MG/ML
620 INJECTION, SOLUTION INTRAVENOUS ONCE
Refills: 0 | Status: COMPLETED | OUTPATIENT
Start: 2023-07-07 | End: 2023-07-07

## 2023-07-07 RX ORDER — DIPHENHYDRAMINE HCL 50 MG
50 CAPSULE ORAL ONCE
Refills: 0 | Status: COMPLETED | OUTPATIENT
Start: 2023-07-07 | End: 2023-07-07

## 2023-07-07 RX ADMIN — Medication 204 MILLIGRAM(S): at 10:03

## 2023-07-07 RX ADMIN — Medication 650 MILLIGRAM(S): at 09:34

## 2023-07-07 RX ADMIN — RITUXIMAB 620 MILLIGRAM(S): 10 INJECTION, SOLUTION INTRAVENOUS at 13:40

## 2023-07-07 RX ADMIN — Medication 204 MILLIGRAM(S): at 09:51

## 2023-07-07 RX ADMIN — RITUXIMAB 620 MILLIGRAM(S): 10 INJECTION, SOLUTION INTRAVENOUS at 10:10

## 2023-07-07 RX ADMIN — Medication 10 MILLIGRAM(S): at 10:06

## 2023-07-07 RX ADMIN — Medication 50 MILLIGRAM(S): at 10:20

## 2023-07-07 RX ADMIN — Medication 650 MILLIGRAM(S): at 10:34

## 2023-07-10 PROBLEM — I95.9 LOW BP: Status: ACTIVE | Noted: 2023-07-10

## 2023-07-10 NOTE — ASSESSMENT
[FreeTextEntry1] : Aline Serra is a 32 year old female who was admitted to [facility] 4/2023 with syncope and found to have ITP.  Platelet count responded to high dose dexamethasone x 4 days.  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  Poor response to steroids.  started rituximab 6/15/23\par \par Plan:\par 1.immune thrombocytopenia\par --presents for dose #4 of rituximab, tolerating treatment well. Will complete 4/4 Rituximab today. \par --CBC today reviewed- platelet counts 131 from 335 on 6/30. \par --Hgb went up to 11.6 \par --will repeat CBC next week to see the trending of platelet. \par --bleeding precautions discussed\par \par 2. low BP\par --slightly low, BP 91/66, asymptomatic. \par --will give NS 500cc at infusion unit.\par \par 3. h/o syncope\par --ITP not the cause\par --needs f/u with cardiology. \par --has an Rx for zofran, will use sparingly to avoid issues w/ QT prolongation.  \par \par Plan discussed with Dr. Mathew\par repeat CBC next week\par RTC in 6 weeks\par with CBC

## 2023-07-10 NOTE — HISTORY OF PRESENT ILLNESS
[de-identified] : Aline Serra is a 32 year old  female presents to clinic for follow up of ITP.\par \par Hematology history:\par She presented to [facility] on 4/6/23 after syncopal episode.  In ED, she was found to have low platelets, 30K which then trended down into the 20's.  Cardiac workup showed mild troponin elevation but echo, cardiac MRI all unremarkable.  She was treated with dexamethasone 40mg QD x 4 days for presumptive diagnosis of ITP and  platelet count improved to normal.  \par \par workup while hospitalized included viral studies - HIV, hep B and C serologies - all negative.  Abd US unremarkable.  RONALD normal.  B12/folate normal.  peripheral blood flow cytometry normal.  smear negative for schistocytes, consistent with ITP with giant platelets present\par \par ITP relapse 6/2023 - poor response to dexamethasone\par rituximab started 6/15/23-7/7/23\par \par PMH; anxiety, covid-19 x 2, vitamin D deficiency, ruptured ovarian cyst\par PSH: none\par FMH: aunt with QT prolongation, mother with ICD. no family history of blood disease \par SH: never a smoker, social alcohol use, works as an \par   [de-identified] : She presents to clinic with her mother.  She did not have any syncopal episode or signs of bleeding, reports occasional  night sweats but not  drenching. BP 91/66 today. She is feeling good. Denies nausea, vomiting, dizziness, headache, chest pain, fever, or palpitation. \par \par

## 2023-07-10 NOTE — HISTORY OF PRESENT ILLNESS
[de-identified] : Aline Serra is a 32 year old  female presents to clinic for follow up of ITP.\par \par Hematology history:\par She presented to [facility] on 4/6/23 after syncopal episode.  In ED, she was found to have low platelets, 30K which then trended down into the 20's.  Cardiac workup showed mild troponin elevation but echo, cardiac MRI all unremarkable.  She was treated with dexamethasone 40mg QD x 4 days for presumptive diagnosis of ITP and  platelet count improved to normal.  \par \par workup while hospitalized included viral studies - HIV, hep B and C serologies - all negative.  Abd US unremarkable.  RONALD normal.  B12/folate normal.  peripheral blood flow cytometry normal.  smear negative for schistocytes, consistent with ITP with giant platelets present\par \par ITP relapse 6/2023 - poor response to dexamethasone\par rituximab started 6/15/23-7/7/23\par \par PMH; anxiety, covid-19 x 2, vitamin D deficiency, ruptured ovarian cyst\par PSH: none\par FMH: aunt with QT prolongation, mother with ICD. no family history of blood disease \par SH: never a smoker, social alcohol use, works as an \par   [de-identified] : She presents to clinic with her mother.  She did not have any syncopal episode or signs of bleeding, reports occasional  night sweats but not  drenching. BP 91/66 today. She is feeling good. Denies nausea, vomiting, dizziness, headache, chest pain, fever, or palpitation. \par \par  normal

## 2023-07-10 NOTE — ASSESSMENT
[FreeTextEntry1] : Aline Serra is a 32 year old female who was admitted to [facility] 4/2023 with syncope and found to have ITP.  Platelet count responded to high dose dexamethasone x 4 days.  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  Poor response to steroids.  started rituximab 6/15/23\par \par Plan:\par 1.immune thrombocytopenia\par --presents for dose #4 of rituximab, tolerating treatment well. Will complete 4/4 Rituximab today. \par --CBC today reviewed- platelet counts 131 from 335 on 6/30. \par --Hgb went up to 11.6 \par --will repeat CBC next week to see the trending of platelet. \par --bleeding precautions discussed\par \par 2. low BP\par --slightly low, BP 91/66, asymptomatic. \par --will give NS 500cc at infusion unit.\par \par 3. h/o syncope\par --ITP not the cause\par --needs f/u with cardiology. \par --has an Rx for zofran, will use sparingly to avoid issues w/ QT prolongation.  \par \par Plan discussed with Dr. Mathew\par repeat CBC next week\par RTC in 6 weeks\par with CBC

## 2023-07-10 NOTE — REVIEW OF SYSTEMS
[Fever] : no fever [Chills] : no chills [Night Sweats] : night sweats [Fatigue] : fatigue [Recent Change In Weight] : ~T no recent weight change [Dysphagia] : no dysphagia [Loss of Hearing] : no loss of hearing [Nosebleeds] : no nosebleeds [Hoarseness] : no hoarseness [Odynophagia] : no odynophagia [Mucosal Pain] : no mucosal pain [Suicidal] : not suicidal [Insomnia] : no insomnia [Anxiety] : anxiety [Depression] : no depression [Easy Bleeding] : no tendency for easy bleeding [Easy Bruising] : a tendency for easy bruising [Swollen Glands] : no swollen glands [Negative] : Endocrine [FreeTextEntry2] :  occasional night sweats, not drenching [FreeTextEntry1] : hx of cold induced urticaria

## 2023-07-14 ENCOUNTER — NON-APPOINTMENT (OUTPATIENT)
Age: 33
End: 2023-07-14

## 2023-07-14 ENCOUNTER — OUTPATIENT (OUTPATIENT)
Dept: OUTPATIENT SERVICES | Facility: HOSPITAL | Age: 33
LOS: 1 days | Discharge: HOME | End: 2023-07-14
Payer: COMMERCIAL

## 2023-07-14 PROCEDURE — 33285 INSJ SUBQ CAR RHYTHM MNTR: CPT

## 2023-07-14 PROCEDURE — C1764: CPT

## 2023-07-14 NOTE — PROGRESS NOTE ADULT - SUBJECTIVE AND OBJECTIVE BOX
EPS Progress Note    S: 32 year old female with family history of prolonged QTC and hisotry of Anxiety, who was admitted to [facility] 4/2023 s/p syncope and now presents for ILR implant .      MEDICATIONS  (STANDING):  Adderall 20 mg   Lexapro 10 mg  Lo Loestrin 10 mg            General:  NAD        HEENT:   PERRL, EOMI	  Neck: Supple, - JVD;  Cardiovascular: S1 S2, No JVD  Respiratory: CTA B/L        Gastrointestinal:  Soft, Non-tender, + BS	  Skin: No rashes, No ecchymoses, No cyanosis  Extremities: No edema  Psychiatry: A & O x 3                                    Assessment/Plan:  32 year old female with family history of prolonged QTC and hisotry of Anxiety, who was admitted to [facility] 4/2023 s/p syncope and now presents for ILR implant .   Will discharge home today

## 2023-07-20 ENCOUNTER — APPOINTMENT (OUTPATIENT)
Dept: HEMATOLOGY ONCOLOGY | Facility: CLINIC | Age: 33
End: 2023-07-20
Payer: COMMERCIAL

## 2023-07-20 ENCOUNTER — LABORATORY RESULT (OUTPATIENT)
Age: 33
End: 2023-07-20

## 2023-07-20 VITALS
BODY MASS INDEX: 22.49 KG/M2 | TEMPERATURE: 98.3 F | RESPIRATION RATE: 18 BRPM | OXYGEN SATURATION: 98 % | DIASTOLIC BLOOD PRESSURE: 75 MMHG | SYSTOLIC BLOOD PRESSURE: 108 MMHG | HEART RATE: 84 BPM | HEIGHT: 65 IN | WEIGHT: 135 LBS

## 2023-07-20 PROCEDURE — 99213 OFFICE O/P EST LOW 20 MIN: CPT | Mod: 25

## 2023-07-20 PROCEDURE — 36415 COLL VENOUS BLD VENIPUNCTURE: CPT

## 2023-07-20 NOTE — HISTORY OF PRESENT ILLNESS
[de-identified] : Aline Serra is a 32 year old  female presents to clinic for follow up of ITP.\par \par Hematology history:\par She presented to [facility] on 4/6/23 after syncopal episode.  In ED, she was found to have low platelets, 30K which then trended down into the 20's.  Cardiac workup showed mild troponin elevation but echo, cardiac MRI all unremarkable.  She was treated with dexamethasone 40mg QD x 4 days for presumptive diagnosis of ITP and  platelet count improved to normal.  \par \par workup while hospitalized included viral studies - HIV, hep B and C serologies - all negative.  Abd US unremarkable.  RONALD normal.  B12/folate normal.  peripheral blood flow cytometry normal.  smear negative for schistocytes, consistent with ITP with giant platelets present\par \par ITP relapse 6/2023 - poor response to dexamethasone\par rituximab started 6/15/23-7/7/23\par \par PMH; anxiety, covid-19 x 2, vitamin D deficiency, ruptured ovarian cyst\par PSH: none\par FMH: aunt with QT prolongation, mother with ICD. no family history of blood disease \par SH: never a smoker, social alcohol use, works as an \par   [de-identified] : She presents to clinic with her mother. She had ILR implant on 7/14 and platelet counts were 65K at that time. She noticed big bruising on right pelvic area last week,  denies any syncopal episode or signs of active bleeding, continues to have occasional  night sweats but not drenching. Otherwise she is feeling good. Denies nausea, vomiting, dizziness, headache, chest pain, fever, or palpitation. \par \par

## 2023-07-20 NOTE — PHYSICAL EXAM
[Restricted in physically strenuous activity but ambulatory and able to carry out work of a light or sedentary nature] : Status 1- Restricted in physically strenuous activity but ambulatory and able to carry out work of a light or sedentary nature, e.g., light house work, office work [Normal] : affect appropriate [de-identified] : bruising on right pelvic area

## 2023-07-20 NOTE — REVIEW OF SYSTEMS
[Night Sweats] : night sweats [Fatigue] : fatigue [Anxiety] : anxiety [Easy Bruising] : a tendency for easy bruising [Negative] : Endocrine [Fever] : no fever [Chills] : no chills [Recent Change In Weight] : ~T no recent weight change [Dysphagia] : no dysphagia [Loss of Hearing] : no loss of hearing [Nosebleeds] : no nosebleeds [Hoarseness] : no hoarseness [Odynophagia] : no odynophagia [Mucosal Pain] : no mucosal pain [Suicidal] : not suicidal [Insomnia] : no insomnia [Depression] : no depression [Easy Bleeding] : no tendency for easy bleeding [Swollen Glands] : no swollen glands [FreeTextEntry2] :  occasional night sweats, not drenching [FreeTextEntry1] : hx of cold induced urticaria

## 2023-07-20 NOTE — ASSESSMENT
[FreeTextEntry1] : Aline Serra is a 32 year old female who was admitted to [facility] 4/2023 with syncope and found to have ITP.  Platelet count responded to high dose dexamethasone x 4 days.  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  Poor response to steroids.  started rituximab 6/15/23 and completed on 7/7/23\par \par Plan:\par 1.immune thrombocytopenia\par --s/p Rituximab weekly x4 on 7/7/23, tolerated treatment well  \par --platelet counts dropped to 65K on 7/14. today went up to 403K, denies any episodes of active bleeding. She is feeling good. \par --will repeat CBC in 2 weeks to see the trending of platelet. \par --bleeding precautions discussed\par \par 2. h/o syncope\par --ITP not the cause\par --s/p  ILR implant on 7/14,  f/u with cardiology. \par \par I saw and evaluated the patient with  Dr. Mathew\par repeat CBC in 2 weeks (rx given to her)\par RTC on 8/18\par with CBC

## 2023-07-27 ENCOUNTER — LABORATORY RESULT (OUTPATIENT)
Age: 33
End: 2023-07-27

## 2023-08-03 ENCOUNTER — APPOINTMENT (OUTPATIENT)
Dept: HEART AND VASCULAR | Facility: CLINIC | Age: 33
End: 2023-08-03
Payer: COMMERCIAL

## 2023-08-03 VITALS
DIASTOLIC BLOOD PRESSURE: 78 MMHG | SYSTOLIC BLOOD PRESSURE: 120 MMHG | HEART RATE: 107 BPM | HEIGHT: 65 IN | BODY MASS INDEX: 22.49 KG/M2 | WEIGHT: 135 LBS | TEMPERATURE: 97.6 F

## 2023-08-03 PROCEDURE — 93291 INTERROG DEV EVAL SCRMS IP: CPT

## 2023-08-03 PROCEDURE — 99212 OFFICE O/P EST SF 10 MIN: CPT | Mod: 25

## 2023-08-08 NOTE — PHYSICAL EXAM
[Well Developed] : well developed [Well Nourished] : well nourished [No Acute Distress] : no acute distress [Normal S1, S2] : normal S1, S2 [Clear Lung Fields] : clear lung fields [Good Air Entry] : good air entry [No Respiratory Distress] : no respiratory distress  [Soft] : abdomen soft [Normal Gait] : normal gait [No Edema] : no edema [No Rash] : no rash [Moves all extremities] : moves all extremities [Alert and Oriented] : alert and oriented [Normal Speech] : normal speech [Clean] : clean [Dry] : dry [Well-Healed] : well-healed [Palpable Crepitus] : no palpable crepitus [Bleeding] : no active bleeding [Foul Odor] : no foul smell [Purulent Drainage] : no purulent drainage [Serosanguineous Drainage] : no serosanquineous drainage [Serous Drainage] : no serous drainage [Erythema] : not erythematous [Warm] : not warm [Tender] : not tender [Indurated] : not indurated [Fluctuant] : not fluctuant

## 2023-08-08 NOTE — ADDENDUM
[FreeTextEntry1] : I, Jordon Landa, hereby attest that the medical record entry for this patient accurately reflects signatures/notations that I made on the Date of Service in my capacity as an Attending Physician when I treated/diagnosed the above patient. I do hereby attest that this information is true, accurate and complete to the best of my knowledge and I understand that any falsification, omission, or concealment of material fact may subject me to administrative, civil, or, criminal liability. I agree with the note as written by my PA in its entirety. I was present for the entire visit and supervised the entire visit and agree with the plan as outlined.   I, Tre Fortune, am scribing for and the presence of Dr. Landa the following sections: HPI, PMH,Family/social history, ROS, Physical Exam, Assessment / Plan.

## 2023-08-08 NOTE — DISCUSSION/SUMMARY
[FreeTextEntry1] : 32 year old female with family history of prolonged QTC and history of Anxiety, who was admitted to [facility] 4/2023 s/p syncope and recurrent syncope 7/2023 s/p ILR with night time bradycardia / heart block, who now presents for follow up.   She is doing well with no recurrent syncope.  2 night time pauses she was asymptomatic from.  Syncope sounds vagal in nature.  She has heart block during sleeping hours which also is vagal in nature.  We discussed that given her history and the way these episodes looked on the monitor the syncope and night time heart block are likely unrelated.  We discussed if they were related we would proceed with a permanent pacemaker.  Given her young age, history and that this was her first episode of syncope we decided not to proceed with a pacemaker after discussing risks and benefits.  We will continue long term monitoring with a loop recorder.  She knows to stay well hydrated, avoid rapid changes in posture, liberalize her salt intake and if she becomes lightheaded to go into a safe position.  She will follow up in 3-4 or sooner if needed and knows to call with any questions or concerns. REmote monitoring is working.

## 2023-08-08 NOTE — REVIEW OF SYSTEMS
[Negative] : Heme/Lymph [Fever] : no fever [Chills] : no chills [Feeling Fatigued] : feeling fatigued [SOB] : no shortness of breath [Dyspnea on exertion] : not dyspnea during exertion [Chest Discomfort] : no chest discomfort [Leg Claudication] : no intermittent leg claudication [Palpitations] : no palpitations [Orthopnea] : no orthopnea

## 2023-08-08 NOTE — PROCEDURE
[de-identified] : [employer] REVEAL LINQ battery good sensing good symptom NSR (was just checking patient activator) 2 pauses while sleeping - asymptomatic

## 2023-08-08 NOTE — HISTORY OF PRESENT ILLNESS
[FreeTextEntry1] : 32 year old female with family history of prolonged QTC and history of Anxiety, who was admitted to [facility] 4/2023 s/p syncope and recurrent syncope 7/2023 s/p ILR with night time bradycardia / heart block, who now presents for follow up,  She was admitted to [facility] 4/2023 s/p syncope.  She was climbing 6 flights of stairs while carrying laundry and felt SOB, followed by loss of consciousness and striking head. In ED, VSS, Labs significant for Trop I 9153 > 72847, Ddimer 427, WBC 14.64 w/ left shift and PLT 30K. EKG SR w/ LVH and diffuse STD. CTH unremarkable and CTA chest negative for PE. She was transferred to [facility];  Troponin trended down.  Telemetry unremarkable.  Echo  revealed normal LVEF w/o valvular disease and CCTA revealed Ca score 0 and normal coronaries.  Hospital course significant for Thrombocytopenia,  Heme/Onc consulted and felt likely idiopathic thrombocytopenia.  She has hematology follow up.   Cardiac MRI performed showing  1. There is no convincing focus of increased T2 signal to suggest myocardial edema.  2.  No global or segmental wall motion abnormality.  3.  At the inferoseptal wall at the mid cavity is a subtle linear focus of increased signal on the late gadolinium enhanced sequences. Remainder of the late gadolinium sequences are normal.  4. There is finding of uncertain etiology (? Secondary to averaging along the course of a conspicuous septal division and a subtle myocardial channels along the anteroseptal wall).    Patient had Exercise Stress Test to look for arrhythmias which showed revealing PAC and PVCS post stress, no other arrhythmia noted.    She wore an event monitor with transient, asymptomatic heart block at night times only.  Normal QRS throughout the monitoring period.  Heart rates 39- bpm.  Decision made for observation and then she had recurrent syncope 7/2023 s/p implantable ILR.  She presents for follow up.  Only complaint is fatigue .  No device related issues.  No chest pain, near syncope, palpitations, orthopnea, PND or edema.  She has had no further syncope since discharge from the hospital.

## 2023-08-10 ENCOUNTER — LABORATORY RESULT (OUTPATIENT)
Age: 33
End: 2023-08-10

## 2023-08-18 ENCOUNTER — NON-APPOINTMENT (OUTPATIENT)
Age: 33
End: 2023-08-18

## 2023-08-18 ENCOUNTER — APPOINTMENT (OUTPATIENT)
Dept: HEART AND VASCULAR | Facility: CLINIC | Age: 33
End: 2023-08-18
Payer: COMMERCIAL

## 2023-08-18 ENCOUNTER — APPOINTMENT (OUTPATIENT)
Dept: HEMATOLOGY ONCOLOGY | Facility: CLINIC | Age: 33
End: 2023-08-18

## 2023-08-18 PROCEDURE — G2066: CPT

## 2023-08-18 PROCEDURE — 93298 REM INTERROG DEV EVAL SCRMS: CPT

## 2023-08-31 ENCOUNTER — APPOINTMENT (OUTPATIENT)
Dept: HEMATOLOGY ONCOLOGY | Facility: CLINIC | Age: 33
End: 2023-08-31
Payer: COMMERCIAL

## 2023-08-31 ENCOUNTER — LABORATORY RESULT (OUTPATIENT)
Age: 33
End: 2023-08-31

## 2023-08-31 ENCOUNTER — NON-APPOINTMENT (OUTPATIENT)
Age: 33
End: 2023-08-31

## 2023-08-31 VITALS
SYSTOLIC BLOOD PRESSURE: 112 MMHG | WEIGHT: 130 LBS | DIASTOLIC BLOOD PRESSURE: 78 MMHG | RESPIRATION RATE: 18 BRPM | HEIGHT: 65 IN | TEMPERATURE: 98.3 F | HEART RATE: 117 BPM | BODY MASS INDEX: 21.66 KG/M2 | OXYGEN SATURATION: 96 %

## 2023-08-31 PROCEDURE — 36415 COLL VENOUS BLD VENIPUNCTURE: CPT

## 2023-08-31 PROCEDURE — 99214 OFFICE O/P EST MOD 30 MIN: CPT | Mod: 25

## 2023-09-01 NOTE — HISTORY OF PRESENT ILLNESS
[de-identified] : Aline Serra is a 32 year old  female presents to clinic for follow up of ITP.  Hematology history: She presented to [facility] on 4/6/23 after syncopal episode.  In ED, she was found to have low platelets, 30K which then trended down into the 20's.  Cardiac workup showed mild troponin elevation but echo, cardiac MRI all unremarkable.  She was treated with dexamethasone 40mg QD x 4 days for presumptive diagnosis of ITP and  platelet count improved to normal.    workup while hospitalized included viral studies - HIV, hep B and C serologies - all negative.  Abd US unremarkable.  RONALD normal.  B12/folate normal.  peripheral blood flow cytometry normal.  smear negative for schistocytes, consistent with ITP with giant platelets present  ITP relapse 6/2023 - poor response to dexamethasone rituximab x 4 doses 6/15/23-7/7/23  PMH; anxiety, covid-19 x 2, vitamin D deficiency, ruptured ovarian cyst PSH: none FMH: aunt with QT prolongation, mother with ICD. no family history of blood disease  SH: never a smoker, social alcohol use, works as an    Platelet trend: 209 today --> 214 08/10 --> 180 07/27 --> 403 07/20 --> 65 07/14  [de-identified] : She presents to clinic with her mother. She had ILR implant on 7/14 and platelet counts were 65K at that time.  Denies any syncopal episode or signs of active bleeding, continues to have occasional  night sweats but not drenching. Otherwise she is feeling good. Denies nausea, vomiting, dizziness, headache, chest pain, fever, or palpitation.  She endorses some bl shin bruising following a family meeting where she did a lot of physical activity. She is curious whether her taking Lexapro might have decreased her platelet count. She started taking it 3 years ago. She remembers her platelet count being normal on routine bloodwork done two years ago.

## 2023-09-01 NOTE — END OF VISIT
[] : Fellow [FreeTextEntry3] :  I evaluated the pt with the hematology fellow Dr Vasquez.  The patient presents for f/u of ITP, approximately 7 weeks after completing rituximab infusions.  The platelet count is normal and stable.  Repeat CBC in 1 month.  Return precautions reviewed.  Order for CBC provided.  RTC in 2-3 months, barring problems in the interim.  OK to continue lexapro, letter provided, doubt connection to the ITP and important for her mental  health.

## 2023-09-01 NOTE — PHYSICAL EXAM
[Restricted in physically strenuous activity but ambulatory and able to carry out work of a light or sedentary nature] : Status 1- Restricted in physically strenuous activity but ambulatory and able to carry out work of a light or sedentary nature, e.g., light house work, office work [Normal] : affect appropriate [de-identified] : Light bruising on anterior ble

## 2023-09-01 NOTE — ASSESSMENT
[FreeTextEntry1] : Aline Serra is a 32 year old female who was admitted to [facility] 4/2023 with syncope and found to have ITP.  Platelet count responded to high dose dexamethasone x 4 days.  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  Poor response to steroids.  started rituximab 6/15/23 and completed on 7/7/23  Plan: 1.immune thrombocytopenia --s/p Rituximab weekly x4 on 7/7/23, tolerated treatment well   --Platelet counts stable, 209 today   -- Given platelet stability over the last month and no concerning reported symptoms will proceed with monthly bloodwork from now. She was instructed she may call the office any time if she experiences easy bleeding/bruising, pre-syncope/syncope, or with any questions. -- All questions were answered. Given that drug immune thrombocytopenia is associated with newly introduced medications and usually appears 5-10 days following offending agent administration, it is unlikely that Lexapro that she has been taking for three years is the etiology for her ITP. Letter to the prescribing provider given.   2. h/o syncope --ITP not the cause --s/p  ILR implant on 7/14,  f/u with cardiology.   D/w Dr Mathew CBC in 1 mo, RTC in 2-3 mos if patient continues to be asymptomatic.

## 2023-09-21 ENCOUNTER — NON-APPOINTMENT (OUTPATIENT)
Age: 33
End: 2023-09-21

## 2023-09-21 ENCOUNTER — APPOINTMENT (OUTPATIENT)
Dept: HEART AND VASCULAR | Facility: CLINIC | Age: 33
End: 2023-09-21
Payer: COMMERCIAL

## 2023-09-22 PROCEDURE — 93298 REM INTERROG DEV EVAL SCRMS: CPT

## 2023-09-22 PROCEDURE — G2066: CPT

## 2023-10-04 ENCOUNTER — LABORATORY RESULT (OUTPATIENT)
Age: 33
End: 2023-10-04

## 2023-10-06 ENCOUNTER — LABORATORY RESULT (OUTPATIENT)
Age: 33
End: 2023-10-06

## 2023-10-06 ENCOUNTER — APPOINTMENT (OUTPATIENT)
Dept: HEMATOLOGY ONCOLOGY | Facility: CLINIC | Age: 33
End: 2023-10-06
Payer: COMMERCIAL

## 2023-10-06 VITALS
TEMPERATURE: 97.9 F | HEART RATE: 119 BPM | WEIGHT: 133 LBS | OXYGEN SATURATION: 96 % | SYSTOLIC BLOOD PRESSURE: 103 MMHG | DIASTOLIC BLOOD PRESSURE: 72 MMHG | HEIGHT: 65 IN | RESPIRATION RATE: 18 BRPM | BODY MASS INDEX: 22.16 KG/M2

## 2023-10-06 PROCEDURE — 99215 OFFICE O/P EST HI 40 MIN: CPT | Mod: 25

## 2023-10-06 PROCEDURE — 36415 COLL VENOUS BLD VENIPUNCTURE: CPT

## 2023-10-09 LAB — PLATELET # PLAS AUTO: 30 K/UL

## 2023-10-11 ENCOUNTER — LABORATORY RESULT (OUTPATIENT)
Age: 33
End: 2023-10-11

## 2023-10-11 ENCOUNTER — APPOINTMENT (OUTPATIENT)
Dept: HEMATOLOGY ONCOLOGY | Facility: CLINIC | Age: 33
End: 2023-10-11
Payer: COMMERCIAL

## 2023-10-11 VITALS
SYSTOLIC BLOOD PRESSURE: 122 MMHG | OXYGEN SATURATION: 98 % | WEIGHT: 131 LBS | BODY MASS INDEX: 21.83 KG/M2 | HEIGHT: 65 IN | HEART RATE: 98 BPM | DIASTOLIC BLOOD PRESSURE: 84 MMHG | RESPIRATION RATE: 18 BRPM

## 2023-10-11 PROCEDURE — 99213 OFFICE O/P EST LOW 20 MIN: CPT | Mod: 25

## 2023-10-11 PROCEDURE — 36415 COLL VENOUS BLD VENIPUNCTURE: CPT

## 2023-10-16 ENCOUNTER — APPOINTMENT (OUTPATIENT)
Dept: SURGICAL ONCOLOGY | Facility: CLINIC | Age: 33
End: 2023-10-16
Payer: COMMERCIAL

## 2023-10-16 PROCEDURE — 99203 OFFICE O/P NEW LOW 30 MIN: CPT | Mod: 95

## 2023-10-19 ENCOUNTER — NON-APPOINTMENT (OUTPATIENT)
Age: 33
End: 2023-10-19

## 2023-10-19 ENCOUNTER — APPOINTMENT (OUTPATIENT)
Dept: CT IMAGING | Facility: CLINIC | Age: 33
End: 2023-10-19
Payer: COMMERCIAL

## 2023-10-19 ENCOUNTER — OUTPATIENT (OUTPATIENT)
Dept: OUTPATIENT SERVICES | Facility: HOSPITAL | Age: 33
LOS: 1 days | End: 2023-10-19

## 2023-10-19 PROCEDURE — 74177 CT ABD & PELVIS W/CONTRAST: CPT | Mod: 26

## 2023-10-20 ENCOUNTER — APPOINTMENT (OUTPATIENT)
Dept: INTERVENTIONAL RADIOLOGY/VASCULAR | Facility: HOSPITAL | Age: 33
End: 2023-10-20
Payer: COMMERCIAL

## 2023-10-20 ENCOUNTER — RESULT REVIEW (OUTPATIENT)
Age: 33
End: 2023-10-20

## 2023-10-20 ENCOUNTER — OUTPATIENT (OUTPATIENT)
Dept: OUTPATIENT SERVICES | Facility: HOSPITAL | Age: 33
LOS: 1 days | End: 2023-10-20
Payer: COMMERCIAL

## 2023-10-20 LAB
BASOPHILS # BLD AUTO: 0.06 K/UL
BASOPHILS NFR BLD AUTO: 1 %
EOSINOPHIL # BLD AUTO: 0.12 K/UL
EOSINOPHIL NFR BLD AUTO: 1.9 %
HCT VFR BLD CALC: 38.6 %
HGB BLD-MCNC: 13 G/DL
IMM GRANULOCYTES NFR BLD AUTO: 0.8 %
LYMPHOCYTES # BLD AUTO: 2.94 K/UL
LYMPHOCYTES NFR BLD AUTO: 47.4 %
MAN DIFF?: NORMAL
MCHC RBC-ENTMCNC: 30 PG
MCHC RBC-ENTMCNC: 33.7 GM/DL
MCV RBC AUTO: 89.1 FL
MONOCYTES # BLD AUTO: 0.48 K/UL
MONOCYTES NFR BLD AUTO: 7.7 %
NEUTROPHILS # BLD AUTO: 2.55 K/UL
NEUTROPHILS NFR BLD AUTO: 41.2 %
PLATELET # BLD AUTO: 185 K/UL
RBC # BLD: 4.33 M/UL
RBC # FLD: 13.4 %
WBC # FLD AUTO: 6.2 K/UL

## 2023-10-20 PROCEDURE — 88313 SPECIAL STAINS GROUP 2: CPT

## 2023-10-20 PROCEDURE — 38222 DX BONE MARROW BX & ASPIR: CPT | Mod: LT

## 2023-10-20 PROCEDURE — 77002 NEEDLE LOCALIZATION BY XRAY: CPT | Mod: 26

## 2023-10-20 PROCEDURE — 99153 MOD SED SAME PHYS/QHP EA: CPT

## 2023-10-20 PROCEDURE — 99152 MOD SED SAME PHYS/QHP 5/>YRS: CPT

## 2023-10-20 PROCEDURE — 88311 DECALCIFY TISSUE: CPT | Mod: 26

## 2023-10-20 PROCEDURE — 88311 DECALCIFY TISSUE: CPT

## 2023-10-20 PROCEDURE — 88189 FLOWCYTOMETRY/READ 16 & >: CPT

## 2023-10-20 PROCEDURE — 85097 BONE MARROW INTERPRETATION: CPT

## 2023-10-20 PROCEDURE — 88342 IMHCHEM/IMCYTCHM 1ST ANTB: CPT | Mod: 26,59

## 2023-10-20 PROCEDURE — 88305 TISSUE EXAM BY PATHOLOGIST: CPT | Mod: 26

## 2023-10-20 PROCEDURE — 88341 IMHCHEM/IMCYTCHM EA ADD ANTB: CPT | Mod: 26,59

## 2023-10-20 PROCEDURE — 88313 SPECIAL STAINS GROUP 2: CPT | Mod: 26

## 2023-10-20 PROCEDURE — 77002 NEEDLE LOCALIZATION BY XRAY: CPT

## 2023-10-20 PROCEDURE — 88341 IMHCHEM/IMCYTCHM EA ADD ANTB: CPT

## 2023-10-20 PROCEDURE — 38222 DX BONE MARROW BX & ASPIR: CPT

## 2023-10-20 PROCEDURE — 88305 TISSUE EXAM BY PATHOLOGIST: CPT

## 2023-10-20 PROCEDURE — C1830: CPT

## 2023-10-24 ENCOUNTER — APPOINTMENT (OUTPATIENT)
Dept: HEART AND VASCULAR | Facility: CLINIC | Age: 33
End: 2023-10-24
Payer: COMMERCIAL

## 2023-10-24 ENCOUNTER — NON-APPOINTMENT (OUTPATIENT)
Age: 33
End: 2023-10-24

## 2023-10-24 PROCEDURE — G2066: CPT

## 2023-10-24 PROCEDURE — 93298 REM INTERROG DEV EVAL SCRMS: CPT | Mod: NC

## 2023-10-25 ENCOUNTER — APPOINTMENT (OUTPATIENT)
Dept: HEMATOLOGY ONCOLOGY | Facility: CLINIC | Age: 33
End: 2023-10-25
Payer: COMMERCIAL

## 2023-10-25 VITALS
BODY MASS INDEX: 29.82 KG/M2 | RESPIRATION RATE: 18 BRPM | DIASTOLIC BLOOD PRESSURE: 80 MMHG | TEMPERATURE: 98.3 F | HEART RATE: 101 BPM | OXYGEN SATURATION: 97 % | WEIGHT: 179 LBS | SYSTOLIC BLOOD PRESSURE: 116 MMHG | HEIGHT: 65 IN

## 2023-10-25 PROCEDURE — 99213 OFFICE O/P EST LOW 20 MIN: CPT | Mod: 25

## 2023-10-25 PROCEDURE — 36415 COLL VENOUS BLD VENIPUNCTURE: CPT

## 2023-10-26 LAB
HEMATOPATHOLOGY REPORT: SIGNIFICANT CHANGE UP
HEMATOPATHOLOGY REPORT: SIGNIFICANT CHANGE UP

## 2023-10-29 LAB
BASOPHILS # BLD AUTO: 0.02 K/UL
BASOPHILS NFR BLD AUTO: 0.2 %
EOSINOPHIL # BLD AUTO: 0.14 K/UL
EOSINOPHIL NFR BLD AUTO: 1.3 %
HCT VFR BLD CALC: 34.3 %
HGB BLD-MCNC: 11.9 G/DL
IMM GRANULOCYTES NFR BLD AUTO: 0.5 %
LYMPHOCYTES # BLD AUTO: 2.51 K/UL
LYMPHOCYTES NFR BLD AUTO: 23 %
MAN DIFF?: NORMAL
MCHC RBC-ENTMCNC: 30.4 PG
MCHC RBC-ENTMCNC: 34.7 GM/DL
MCV RBC AUTO: 87.7 FL
MONOCYTES # BLD AUTO: 0.42 K/UL
MONOCYTES NFR BLD AUTO: 3.8 %
NEUTROPHILS # BLD AUTO: 7.77 K/UL
NEUTROPHILS NFR BLD AUTO: 71.2 %
PLATELET # BLD AUTO: 342 K/UL
RBC # BLD: 3.91 M/UL
RBC # FLD: 13 %
WBC # FLD AUTO: 10.91 K/UL

## 2023-10-30 ENCOUNTER — NON-APPOINTMENT (OUTPATIENT)
Age: 33
End: 2023-10-30

## 2023-10-31 RX ORDER — PNEUMOCOCCAL 20-VALENT CONJUGATE VACCINE 2.2; 2.2; 2.2; 2.2; 2.2; 2.2; 2.2; 2.2; 2.2; 2.2; 2.2; 2.2; 2.2; 2.2; 2.2; 2.2; 4.4; 2.2; 2.2; 2.2 UG/.5ML; UG/.5ML; UG/.5ML; UG/.5ML; UG/.5ML; UG/.5ML; UG/.5ML; UG/.5ML; UG/.5ML; UG/.5ML; UG/.5ML; UG/.5ML; UG/.5ML; UG/.5ML; UG/.5ML; UG/.5ML; UG/.5ML; UG/.5ML; UG/.5ML; UG/.5ML
INJECTION, SUSPENSION INTRAMUSCULAR
Qty: 1 | Refills: 0 | Status: ACTIVE | COMMUNITY
Start: 2023-10-31 | End: 1900-01-01

## 2023-10-31 RX ORDER — NEISSERIA MENINGITIDIS SEROGROUP B NHBA FUSION PROTEIN ANTIGEN, NEISSERIA MENINGITIDIS SEROGROUP B FHBP FUSION PROTEIN ANTIGEN AND NEISSERIA MENINGITIDIS SEROGROUP B NADA PROTEIN ANTIGEN 50; 50; 50; 25 UG/.5ML; UG/.5ML; UG/.5ML; UG/.5ML
INJECTION, SUSPENSION INTRAMUSCULAR
Qty: 1 | Refills: 0 | Status: ACTIVE | COMMUNITY
Start: 2023-10-31 | End: 1900-01-01

## 2023-10-31 RX ORDER — HAEMOPHILUS B POLYSACCHARIDE CONJUGATE VACCINE FOR INJ 10 MCG/0.5
RECON SOLN INTRAMUSCULAR
Qty: 1 | Refills: 0 | Status: ACTIVE | COMMUNITY
Start: 2023-10-31 | End: 1900-01-01

## 2023-10-31 RX ORDER — MENINGOCOCCAL (GROUPS A, C, Y AND W-135) OLIGOSACCHARIDE DIPHTHERIA CRM197 CONJUGATE VACCINE 10-5/0.5ML
KIT INTRAMUSCULAR
Qty: 1 | Refills: 0 | Status: ACTIVE | COMMUNITY
Start: 2023-10-31 | End: 1900-01-01

## 2023-11-05 LAB
BASOPHILS # BLD AUTO: 0.07 K/UL
BASOPHILS NFR BLD AUTO: 0.6 %
EOSINOPHIL # BLD AUTO: 0.04 K/UL
EOSINOPHIL NFR BLD AUTO: 0.3 %
HCT VFR BLD CALC: 39.2 %
HGB BLD-MCNC: 12.9 G/DL
IMM GRANULOCYTES NFR BLD AUTO: 0.4 %
LYMPHOCYTES # BLD AUTO: 2.46 K/UL
LYMPHOCYTES NFR BLD AUTO: 20.8 %
MAN DIFF?: NORMAL
MCHC RBC-ENTMCNC: 30.3 PG
MCHC RBC-ENTMCNC: 32.9 GM/DL
MCV RBC AUTO: 92 FL
MONOCYTES # BLD AUTO: 0.62 K/UL
MONOCYTES NFR BLD AUTO: 5.2 %
NEUTROPHILS # BLD AUTO: 8.57 K/UL
NEUTROPHILS NFR BLD AUTO: 72.7 %
PLATELET # BLD AUTO: 170 K/UL
RBC # BLD: 4.26 M/UL
RBC # FLD: 14 %
WBC # FLD AUTO: 11.81 K/UL

## 2023-11-09 ENCOUNTER — APPOINTMENT (OUTPATIENT)
Dept: SURGICAL ONCOLOGY | Facility: CLINIC | Age: 33
End: 2023-11-09
Payer: COMMERCIAL

## 2023-11-09 ENCOUNTER — MED ADMIN CHARGE (OUTPATIENT)
Age: 33
End: 2023-11-09

## 2023-11-09 DIAGNOSIS — Z23 ENCOUNTER FOR IMMUNIZATION: ICD-10-CM

## 2023-11-09 PROCEDURE — 90620 MENB-4C VACCINE IM: CPT

## 2023-11-09 PROCEDURE — 90648 HIB PRP-T VACCINE 4 DOSE IM: CPT

## 2023-11-09 PROCEDURE — G0009: CPT

## 2023-11-09 PROCEDURE — 90677 PCV20 VACCINE IM: CPT

## 2023-11-09 PROCEDURE — 90472 IMMUNIZATION ADMIN EACH ADD: CPT

## 2023-11-14 ENCOUNTER — NON-APPOINTMENT (OUTPATIENT)
Age: 33
End: 2023-11-14

## 2023-11-14 LAB
BASOPHILS # BLD AUTO: 0.05 K/UL
BASOPHILS NFR BLD AUTO: 0.5 %
EOSINOPHIL # BLD AUTO: 0.2 K/UL
EOSINOPHIL NFR BLD AUTO: 1.9 %
HCT VFR BLD CALC: 38 %
HGB BLD-MCNC: 12.5 G/DL
IMM GRANULOCYTES NFR BLD AUTO: 0.9 %
LYMPHOCYTES # BLD AUTO: 2.06 K/UL
LYMPHOCYTES NFR BLD AUTO: 20 %
MAN DIFF?: NORMAL
MCHC RBC-ENTMCNC: 30.6 PG
MCHC RBC-ENTMCNC: 32.9 GM/DL
MCV RBC AUTO: 92.9 FL
MONOCYTES # BLD AUTO: 0.57 K/UL
MONOCYTES NFR BLD AUTO: 5.5 %
NEUTROPHILS # BLD AUTO: 7.32 K/UL
NEUTROPHILS NFR BLD AUTO: 71.2 %
PLATELET # BLD AUTO: 278 K/UL
RBC # BLD: 4.09 M/UL
RBC # FLD: 13.8 %
WBC # FLD AUTO: 10.29 K/UL

## 2023-11-15 ENCOUNTER — APPOINTMENT (OUTPATIENT)
Dept: HEMATOLOGY ONCOLOGY | Facility: CLINIC | Age: 33
End: 2023-11-15

## 2023-11-20 ENCOUNTER — NON-APPOINTMENT (OUTPATIENT)
Age: 33
End: 2023-11-20

## 2023-11-29 ENCOUNTER — NON-APPOINTMENT (OUTPATIENT)
Age: 33
End: 2023-11-29

## 2023-11-29 LAB
BASOPHILS # BLD AUTO: 0.05 K/UL
BASOPHILS NFR BLD AUTO: 0.5 %
EOSINOPHIL # BLD AUTO: 0.14 K/UL
EOSINOPHIL NFR BLD AUTO: 1.4 %
HCT VFR BLD CALC: 38.6 %
HGB BLD-MCNC: 12.5 G/DL
IMM GRANULOCYTES NFR BLD AUTO: 0.9 %
LYMPHOCYTES # BLD AUTO: 3.28 K/UL
LYMPHOCYTES NFR BLD AUTO: 32.6 %
MAN DIFF?: NORMAL
MCHC RBC-ENTMCNC: 30.9 PG
MCHC RBC-ENTMCNC: 32.4 GM/DL
MCV RBC AUTO: 95.5 FL
MONOCYTES # BLD AUTO: 0.88 K/UL
MONOCYTES NFR BLD AUTO: 8.8 %
NEUTROPHILS # BLD AUTO: 5.61 K/UL
NEUTROPHILS NFR BLD AUTO: 55.8 %
PLATELET # BLD AUTO: 151 K/UL
RBC # BLD: 4.04 M/UL
RBC # FLD: 14.7 %
WBC # FLD AUTO: 10.05 K/UL

## 2023-11-30 ENCOUNTER — APPOINTMENT (OUTPATIENT)
Dept: HEART AND VASCULAR | Facility: CLINIC | Age: 33
End: 2023-11-30
Payer: COMMERCIAL

## 2023-11-30 VITALS
WEIGHT: 134 LBS | SYSTOLIC BLOOD PRESSURE: 121 MMHG | DIASTOLIC BLOOD PRESSURE: 75 MMHG | BODY MASS INDEX: 22.33 KG/M2 | HEART RATE: 110 BPM | HEIGHT: 65 IN

## 2023-11-30 DIAGNOSIS — Z87.898 PERSONAL HISTORY OF OTHER SPECIFIED CONDITIONS: ICD-10-CM

## 2023-11-30 PROCEDURE — 99214 OFFICE O/P EST MOD 30 MIN: CPT | Mod: 25

## 2023-11-30 PROCEDURE — 93291 INTERROG DEV EVAL SCRMS IP: CPT

## 2023-12-12 ENCOUNTER — NON-APPOINTMENT (OUTPATIENT)
Age: 33
End: 2023-12-12

## 2023-12-12 ENCOUNTER — APPOINTMENT (OUTPATIENT)
Dept: HEART AND VASCULAR | Facility: CLINIC | Age: 33
End: 2023-12-12

## 2023-12-12 RX ORDER — PREDNISONE 20 MG/1
20 TABLET ORAL
Qty: 40 | Refills: 1 | Status: DISCONTINUED | COMMUNITY
Start: 2023-10-06 | End: 2023-12-12

## 2024-01-03 ENCOUNTER — APPOINTMENT (OUTPATIENT)
Dept: HEART AND VASCULAR | Facility: CLINIC | Age: 34
End: 2024-01-03
Payer: COMMERCIAL

## 2024-01-03 ENCOUNTER — NON-APPOINTMENT (OUTPATIENT)
Age: 34
End: 2024-01-03

## 2024-01-03 PROCEDURE — 93298 REM INTERROG DEV EVAL SCRMS: CPT

## 2024-01-04 LAB
BASOPHILS # BLD AUTO: 0.05 K/UL
BASOPHILS NFR BLD AUTO: 0.6 %
EOSINOPHIL # BLD AUTO: 0.19 K/UL
EOSINOPHIL NFR BLD AUTO: 2.2 %
HCT VFR BLD CALC: 39.1 %
HGB BLD-MCNC: 12.4 G/DL
IMM GRANULOCYTES NFR BLD AUTO: 0.3 %
LYMPHOCYTES # BLD AUTO: 2.64 K/UL
LYMPHOCYTES NFR BLD AUTO: 30.6 %
MAN DIFF?: NORMAL
MCHC RBC-ENTMCNC: 30.5 PG
MCHC RBC-ENTMCNC: 31.7 GM/DL
MCV RBC AUTO: 96.1 FL
MONOCYTES # BLD AUTO: 0.66 K/UL
MONOCYTES NFR BLD AUTO: 7.6 %
NEUTROPHILS # BLD AUTO: 5.06 K/UL
NEUTROPHILS NFR BLD AUTO: 58.7 %
PLATELET # BLD AUTO: 349 K/UL
RBC # BLD: 4.07 M/UL
RBC # FLD: 13 %
WBC # FLD AUTO: 8.63 K/UL

## 2024-01-22 ENCOUNTER — NON-APPOINTMENT (OUTPATIENT)
Age: 34
End: 2024-01-22

## 2024-01-23 ENCOUNTER — NON-APPOINTMENT (OUTPATIENT)
Age: 34
End: 2024-01-23

## 2024-02-05 ENCOUNTER — LABORATORY RESULT (OUTPATIENT)
Age: 34
End: 2024-02-05

## 2024-02-06 LAB
BASOPHILS # BLD AUTO: 0.07 K/UL
BASOPHILS NFR BLD AUTO: 0.6 %
EOSINOPHIL # BLD AUTO: 0.2 K/UL
EOSINOPHIL NFR BLD AUTO: 1.7 %
HCT VFR BLD CALC: 42.1 %
HGB BLD-MCNC: 13.9 G/DL
IMM GRANULOCYTES NFR BLD AUTO: 0.4 %
LYMPHOCYTES # BLD AUTO: 2.97 K/UL
LYMPHOCYTES NFR BLD AUTO: 24.7 %
MAN DIFF?: NORMAL
MCHC RBC-ENTMCNC: 30.5 PG
MCHC RBC-ENTMCNC: 33 GM/DL
MCV RBC AUTO: 92.5 FL
MONOCYTES # BLD AUTO: 0.71 K/UL
MONOCYTES NFR BLD AUTO: 5.9 %
NEUTROPHILS # BLD AUTO: 8.02 K/UL
NEUTROPHILS NFR BLD AUTO: 66.7 %
PLATELET # BLD AUTO: 428 K/UL
RBC # BLD: 4.55 M/UL
RBC # FLD: 12.5 %
WBC # FLD AUTO: 12.02 K/UL

## 2024-02-07 ENCOUNTER — NON-APPOINTMENT (OUTPATIENT)
Age: 34
End: 2024-02-07

## 2024-02-07 ENCOUNTER — APPOINTMENT (OUTPATIENT)
Dept: HEART AND VASCULAR | Facility: CLINIC | Age: 34
End: 2024-02-07
Payer: SELF-PAY

## 2024-02-08 PROCEDURE — 93298 REM INTERROG DEV EVAL SCRMS: CPT

## 2024-03-05 ENCOUNTER — NON-APPOINTMENT (OUTPATIENT)
Age: 34
End: 2024-03-05

## 2024-03-05 LAB
BASOPHILS # BLD AUTO: 0.04 K/UL
BASOPHILS NFR BLD AUTO: 0.8 %
EOSINOPHIL # BLD AUTO: 0.19 K/UL
EOSINOPHIL NFR BLD AUTO: 3.7 %
HCT VFR BLD CALC: 37.5 %
HGB BLD-MCNC: 12.6 G/DL
IMM GRANULOCYTES NFR BLD AUTO: 0.4 %
LYMPHOCYTES # BLD AUTO: 1.92 K/UL
LYMPHOCYTES NFR BLD AUTO: 37.6 %
MAN DIFF?: NORMAL
MCHC RBC-ENTMCNC: 31.2 PG
MCHC RBC-ENTMCNC: 33.6 GM/DL
MCV RBC AUTO: 92.8 FL
MONOCYTES # BLD AUTO: 0.51 K/UL
MONOCYTES NFR BLD AUTO: 10 %
NEUTROPHILS # BLD AUTO: 2.43 K/UL
NEUTROPHILS NFR BLD AUTO: 47.5 %
PLATELET # BLD AUTO: 300 K/UL
RBC # BLD: 4.04 M/UL
RBC # FLD: 13.2 %
WBC # FLD AUTO: 5.11 K/UL

## 2024-03-06 ENCOUNTER — NON-APPOINTMENT (OUTPATIENT)
Age: 34
End: 2024-03-06

## 2024-03-28 ENCOUNTER — APPOINTMENT (OUTPATIENT)
Dept: HEART AND VASCULAR | Facility: CLINIC | Age: 34
End: 2024-03-28
Payer: COMMERCIAL

## 2024-03-28 VITALS
SYSTOLIC BLOOD PRESSURE: 122 MMHG | WEIGHT: 130 LBS | HEIGHT: 65 IN | BODY MASS INDEX: 21.66 KG/M2 | DIASTOLIC BLOOD PRESSURE: 86 MMHG | HEART RATE: 108 BPM

## 2024-03-28 DIAGNOSIS — I45.9 CONDUCTION DISORDER, UNSPECIFIED: ICD-10-CM

## 2024-03-28 PROCEDURE — 93291 INTERROG DEV EVAL SCRMS IP: CPT

## 2024-03-28 PROCEDURE — 99213 OFFICE O/P EST LOW 20 MIN: CPT | Mod: 25

## 2024-03-28 NOTE — PROCEDURE
[de-identified] : [employer] REVEAL LINQ battery good sensing good sinus pauses in setting of vomiting / GI distress

## 2024-03-28 NOTE — HISTORY OF PRESENT ILLNESS
[FreeTextEntry1] : 33 year old female with family history of prolonged QTC and history of Anxiety, who was admitted to [facility] 4/2023 s/p syncope and recurrent syncope 7/2023 s/p ILR with night time bradycardia / heart block, who now presents for follow up,  She was admitted to [facility] 4/2023 s/p syncope.  She was climbing 6 flights of stairs while carrying laundry and felt SOB, followed by loss of consciousness and striking head. In ED, VSS, Labs significant for Trop I 9153 > 97576, Ddimer 427, WBC 14.64 w/ left shift and PLT 30K. EKG SR w/ LVH and diffuse STD. CTH unremarkable and CTA chest negative for PE. She was transferred to [facility];  Troponin trended down.  Telemetry unremarkable.  Echo  revealed normal LVEF w/o valvular disease and CCTA revealed Ca score 0 and normal coronaries.  Hospital course significant for Thrombocytopenia,  Heme/Onc consulted and felt likely idiopathic thrombocytopenia.  She has hematology follow up.   Cardiac MRI performed showing  1. There is no convincing focus of increased T2 signal to suggest myocardial edema.  2.  No global or segmental wall motion abnormality.  3.  At the inferoseptal wall at the mid cavity is a subtle linear focus of increased signal on the late gadolinium enhanced sequences. Remainder of the late gadolinium sequences are normal.  4. There is finding of uncertain etiology (? Secondary to averaging along the course of a conspicuous septal division and a subtle myocardial channels along the anteroseptal wall).    Patient had Exercise Stress Test to look for arrhythmias which showed revealing PAC and PVCS post stress, no other arrhythmia noted.    She wore an event monitor with transient, asymptomatic heart block at night times only.  Normal QRS throughout the monitoring period.  Heart rates 39- bpm.  Decision made for observation and then she had recurrent syncope 7/2023 s/p implantable ILR.  She presents for follow up.  Remote transmission with pauses in the setting of vomiting / food poisoning (confirmed dates).  No device related issues.  No further syncope.  No chest pain, near syncope, palpitations, orthopnea, PND or edema.  She may need a splenectomy (just stopped steroids and has repeat blood work pending).

## 2024-03-28 NOTE — REVIEW OF SYSTEMS
[Negative] : Heme/Lymph [Fever] : no fever [Weight Gain (___ Lbs)] : no recent weight gain [Feeling Fatigued] : not feeling fatigued [Chills] : no chills [Weight Loss (___ Lbs)] : no recent weight loss [SOB] : no shortness of breath [Dyspnea on exertion] : not dyspnea during exertion [Chest Discomfort] : no chest discomfort [Lower Ext Edema] : no extremity edema [Palpitations] : no palpitations [Orthopnea] : no orthopnea [Syncope] : no syncope

## 2024-03-28 NOTE — DISCUSSION/SUMMARY
[FreeTextEntry1] : 33 year old female with family history of prolonged QTC and history of Anxiety, who was admitted to [facility] 4/2023 s/p syncope and recurrent syncope 7/2023 s/p ILR with night time bradycardia / heart block, who now presents for follow up.   She is doing well with no recurrent syncope.  Day time pauses while vomiting / GI distress vagal in nature.  She has heart block during sleeping hours which also is vagal in nature.  We discussed that given her history and the way these episodes looked on the monitor the syncope and night time heart block are likely unrelated.  We discussed if they were related we would proceed with a permanent pacemaker.  Given her young age, history and that this was her first episode of syncope we decided not to proceed with a pacemaker after discussing risks and benefits.  We will continue long term monitoring with a loop recorder.  She knows to stay well hydrated, avoid rapid changes in posture, liberalize her salt intake and if she becomes lightheaded to go into a safe position.  She is cleared from a cardiac standpoint for her upcoming potential splenectomy - however it should be noted she has high vagal tone.  She will follow up in 6 months or sooner if needed and knows to call with any questions or concerns. Remote monitoring is working (she re-opened lea). She knows to call with any questions or concerns.

## 2024-04-11 LAB
BASOPHILS # BLD AUTO: 0.05 K/UL
BASOPHILS NFR BLD AUTO: 0.5 %
EOSINOPHIL # BLD AUTO: 0.14 K/UL
EOSINOPHIL NFR BLD AUTO: 1.4 %
HCT VFR BLD CALC: 40.7 %
HGB BLD-MCNC: 13.4 G/DL
IMM GRANULOCYTES NFR BLD AUTO: 0.8 %
LYMPHOCYTES # BLD AUTO: 3.62 K/UL
LYMPHOCYTES NFR BLD AUTO: 35.5 %
MAN DIFF?: NORMAL
MCHC RBC-ENTMCNC: 30.9 PG
MCHC RBC-ENTMCNC: 32.9 GM/DL
MCV RBC AUTO: 94 FL
MONOCYTES # BLD AUTO: 0.65 K/UL
MONOCYTES NFR BLD AUTO: 6.4 %
NEUTROPHILS # BLD AUTO: 5.67 K/UL
NEUTROPHILS NFR BLD AUTO: 55.4 %
PLATELET # BLD AUTO: 55 K/UL
RBC # BLD: 4.33 M/UL
RBC # FLD: 14.4 %
WBC # FLD AUTO: 10.21 K/UL

## 2024-04-18 LAB
BASOPHILS # BLD AUTO: 0.06 K/UL
BASOPHILS NFR BLD AUTO: 0.4 %
EOSINOPHIL # BLD AUTO: 0.16 K/UL
EOSINOPHIL NFR BLD AUTO: 1.1 %
HCT VFR BLD CALC: 40.5 %
HGB BLD-MCNC: 13.9 G/DL
IMM GRANULOCYTES NFR BLD AUTO: 0.9 %
LYMPHOCYTES # BLD AUTO: 3.21 K/UL
LYMPHOCYTES NFR BLD AUTO: 21.1 %
MAN DIFF?: NORMAL
MCHC RBC-ENTMCNC: 30.8 PG
MCHC RBC-ENTMCNC: 34.3 GM/DL
MCV RBC AUTO: 89.6 FL
MONOCYTES # BLD AUTO: 0.88 K/UL
MONOCYTES NFR BLD AUTO: 5.8 %
NEUTROPHILS # BLD AUTO: 10.74 K/UL
NEUTROPHILS NFR BLD AUTO: 70.7 %
PLATELET # BLD AUTO: 26 K/UL
RBC # BLD: 4.52 M/UL
RBC # FLD: 14.6 %
WBC # FLD AUTO: 15.19 K/UL

## 2024-04-18 RX ORDER — PREDNISONE 20 MG/1
20 TABLET ORAL
Qty: 60 | Refills: 1 | Status: ACTIVE | COMMUNITY
Start: 2024-04-18 | End: 1900-01-01

## 2024-04-26 LAB
BASOPHILS # BLD AUTO: 0.03 K/UL
BASOPHILS NFR BLD AUTO: 0.2 %
EOSINOPHIL # BLD AUTO: 0.09 K/UL
EOSINOPHIL NFR BLD AUTO: 0.6 %
HCT VFR BLD CALC: 42.4 %
HGB BLD-MCNC: 13.9 G/DL
IMM GRANULOCYTES NFR BLD AUTO: 0.8 %
LYMPHOCYTES # BLD AUTO: 1.56 K/UL
LYMPHOCYTES NFR BLD AUTO: 10.1 %
MAN DIFF?: NORMAL
MCHC RBC-ENTMCNC: 31.3 PG
MCHC RBC-ENTMCNC: 32.8 GM/DL
MCV RBC AUTO: 95.5 FL
MONOCYTES # BLD AUTO: 0.69 K/UL
MONOCYTES NFR BLD AUTO: 4.5 %
NEUTROPHILS # BLD AUTO: 12.9 K/UL
NEUTROPHILS NFR BLD AUTO: 83.8 %
PLATELET # BLD AUTO: 459 K/UL
RBC # BLD: 4.44 M/UL
RBC # FLD: 13.7 %
WBC # FLD AUTO: 15.4 K/UL

## 2024-04-26 RX ORDER — ONDANSETRON 8 MG/1
8 TABLET, ORALLY DISINTEGRATING ORAL EVERY 8 HOURS
Qty: 45 | Refills: 1 | Status: ACTIVE | COMMUNITY
Start: 2023-06-16 | End: 1900-01-01

## 2024-05-02 ENCOUNTER — NON-APPOINTMENT (OUTPATIENT)
Age: 34
End: 2024-05-02

## 2024-05-02 ENCOUNTER — APPOINTMENT (OUTPATIENT)
Dept: HEART AND VASCULAR | Facility: CLINIC | Age: 34
End: 2024-05-02
Payer: COMMERCIAL

## 2024-05-02 PROCEDURE — 93298 REM INTERROG DEV EVAL SCRMS: CPT

## 2024-05-09 ENCOUNTER — APPOINTMENT (OUTPATIENT)
Dept: HEMATOLOGY ONCOLOGY | Facility: CLINIC | Age: 34
End: 2024-05-09
Payer: COMMERCIAL

## 2024-05-09 ENCOUNTER — LABORATORY RESULT (OUTPATIENT)
Age: 34
End: 2024-05-09

## 2024-05-09 VITALS
RESPIRATION RATE: 18 BRPM | WEIGHT: 125 LBS | DIASTOLIC BLOOD PRESSURE: 88 MMHG | TEMPERATURE: 98.2 F | OXYGEN SATURATION: 95 % | SYSTOLIC BLOOD PRESSURE: 127 MMHG | HEIGHT: 65 IN | BODY MASS INDEX: 20.83 KG/M2 | HEART RATE: 99 BPM

## 2024-05-09 DIAGNOSIS — R11.2 NAUSEA WITH VOMITING, UNSPECIFIED: ICD-10-CM

## 2024-05-09 DIAGNOSIS — Q89.09 CONGENITAL MALFORMATIONS OF SPLEEN: ICD-10-CM

## 2024-05-09 DIAGNOSIS — K29.00 ACUTE GASTRITIS W/OUT BLEEDING: ICD-10-CM

## 2024-05-09 LAB
ALBUMIN SERPL ELPH-MCNC: 3.5 G/DL
ALP BLD-CCNC: 70 U/L
ALT SERPL-CCNC: 67 U/L
ANION GAP SERPL CALC-SCNC: 14 MMOL/L
AST SERPL-CCNC: 86 U/L
BILIRUB SERPL-MCNC: 0.5 MG/DL
BUN SERPL-MCNC: 5 MG/DL
CALCIUM SERPL-MCNC: 9.2 MG/DL
CHLORIDE SERPL-SCNC: 101 MMOL/L
CO2 SERPL-SCNC: 27 MMOL/L
CREAT SERPL-MCNC: 0.5 MG/DL
EGFR: 129 ML/MIN/1.73M2
GLUCOSE SERPL-MCNC: 125 MG/DL
POTASSIUM SERPL-SCNC: 4.5 MMOL/L
PROT SERPL-MCNC: 6.8 G/DL
SODIUM SERPL-SCNC: 142 MMOL/L

## 2024-05-09 PROCEDURE — 99214 OFFICE O/P EST MOD 30 MIN: CPT | Mod: 25

## 2024-05-09 PROCEDURE — 36415 COLL VENOUS BLD VENIPUNCTURE: CPT

## 2024-05-09 RX ORDER — PANTOPRAZOLE 40 MG/1
40 TABLET, DELAYED RELEASE ORAL TWICE DAILY
Qty: 60 | Refills: 1 | Status: ACTIVE | COMMUNITY
Start: 2024-05-09 | End: 1900-01-01

## 2024-05-13 RX ORDER — METOPROLOL TARTRATE 25 MG/1
25 TABLET, FILM COATED ORAL
Qty: 20 | Refills: 3 | Status: ACTIVE | COMMUNITY
Start: 2023-11-30 | End: 1900-01-01

## 2024-05-19 PROBLEM — R11.2 NAUSEA AND VOMITING: Status: ACTIVE | Noted: 2023-06-16

## 2024-05-19 PROBLEM — Q89.09 ACCESSORY SPLEEN: Status: ACTIVE | Noted: 2024-05-19

## 2024-05-19 NOTE — HISTORY OF PRESENT ILLNESS
[de-identified] : Aline Serra is a 33 year old female presents to clinic for follow up of ITP.  Hematology history: She presented to [facility] on 4/6/23 after syncopal episode. In ED, she was found to have low platelets, 30K which then trended down into the 20's. Cardiac workup showed mild troponin elevation but echo, cardiac MRI all unremarkable. She was treated with dexamethasone 40mg QD x 4 days for presumptive diagnosis of ITP and platelet count improved to normal.  workup while hospitalized included viral studies - HIV, hep B and C serologies - all negative. Abd US unremarkable. RONALD normal. B12/folate normal. peripheral blood flow cytometry normal. smear negative for schistocytes, consistent with ITP with giant platelets present  ITP relapse 6/2023 - poor response to dexamethasone rituximab x 4 doses 6/15/23-7/7/23  ITP relapse early Oct 2023 - started prednisone.  consulted w/ surgeon (Dr Cordero) re: splenectomy.  Started vaccination series.    10/23/23: Bone marrow biopsy and aspirate. normocellular for the age of the patient. 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 Full maturation is seen in both myeloid and erythroid cell lineages. Megakaryocytes are increased in number and range from small to normal to rare slightly large forms. Onkosight NGS myeloid report negative.  consistent with ITP.  CT abdomen/pelvis 10/19/23:  no splenomegaly or adenopathy. + incidental small accessory splenule.  Had good response to prednisone and thus splenectomy was deferred.  Tapered down to lowest dose of 5 mg daily.  As she had maintained a good platelet count for several weeks on that dose, we elected to discontinue prednisone late March 2024.   Unfortunately, off steroids she experienced an ITP relapse early April 2024 - re initiated prednisone 5 mg daily which was ineffective.  Increased to high dose prednisone 1 mg/kg daily with response, now tapering.    PMH; anxiety, covid-19 x 2, vitamin D deficiency, ruptured ovarian cyst PSH: none FMH: aunt with QT prolongation, mother with ICD. no family history of blood disease SH: never a smoker, social alcohol use, works as an .    [de-identified] : She presents to clinic for follow up with her mother.  Current prednisone dose = 40 mg po daily. Having daily nausea, not significantly improved with zofran. Bleeding gums with brushing. Denies other bleeding or bruising including epistaxis, blood in urine, or stool. LMP 2 weeks ago, normal flow, 3 days. Denies CP, SOB, HA, rashes.

## 2024-05-19 NOTE — END OF VISIT
[] : Fellow [FreeTextEntry3] :  I evaluated the patient with the hematology/oncology fellow, Dr Sibley. ITP relapse currently on prednisone.  OK to taper dose further.  Next CBC on 5/20/24.  Will also obtain abdominal US due to persistent nausea, elevated LFTs.  Discussion as described above about long term ITP management.  Encouraged f/u with Dr Cordero for discussion of splenectomy.

## 2024-05-19 NOTE — HISTORY OF PRESENT ILLNESS
[de-identified] : Aline Serra is a 33 year old female presents to clinic for follow up of ITP.  Hematology history: She presented to [facility] on 4/6/23 after syncopal episode. In ED, she was found to have low platelets, 30K which then trended down into the 20's. Cardiac workup showed mild troponin elevation but echo, cardiac MRI all unremarkable. She was treated with dexamethasone 40mg QD x 4 days for presumptive diagnosis of ITP and platelet count improved to normal.  workup while hospitalized included viral studies - HIV, hep B and C serologies - all negative. Abd US unremarkable. RONALD normal. B12/folate normal. peripheral blood flow cytometry normal. smear negative for schistocytes, consistent with ITP with giant platelets present  ITP relapse 6/2023 - poor response to dexamethasone rituximab x 4 doses 6/15/23-7/7/23  ITP relapse early Oct 2023 - started prednisone.  consulted w/ surgeon (Dr Cordero) re: splenectomy.  Started vaccination series.    10/23/23: Bone marrow biopsy and aspirate. normocellular for the age of the patient. 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 Full maturation is seen in both myeloid and erythroid cell lineages. Megakaryocytes are increased in number and range from small to normal to rare slightly large forms. Onkosight NGS myeloid report negative.  consistent with ITP.  CT abdomen/pelvis 10/19/23:  no splenomegaly or adenopathy. + incidental small accessory splenule.  Had good response to prednisone and thus splenectomy was deferred.  Tapered down to lowest dose of 5 mg daily.  As she had maintained a good platelet count for several weeks on that dose, we elected to discontinue prednisone late March 2024.   Unfortunately, off steroids she experienced an ITP relapse early April 2024 - re initiated prednisone 5 mg daily which was ineffective.  Increased to high dose prednisone 1 mg/kg daily with response, now tapering.    PMH; anxiety, covid-19 x 2, vitamin D deficiency, ruptured ovarian cyst PSH: none FMH: aunt with QT prolongation, mother with ICD. no family history of blood disease SH: never a smoker, social alcohol use, works as an .    [de-identified] : She presents to clinic for follow up with her mother.  Current prednisone dose = 40 mg po daily. Having daily nausea, not significantly improved with zofran. Bleeding gums with brushing. Denies other bleeding or bruising including epistaxis, blood in urine, or stool. LMP 2 weeks ago, normal flow, 3 days. Denies CP, SOB, HA, rashes.

## 2024-05-19 NOTE — PHYSICAL EXAM
[Normal] : normoactive bowel sounds, soft and nontender, no hepatosplenomegaly or masses appreciated [de-identified] : normal RR, breathing pattern [de-identified] : normal HR, regular [de-identified] : not distended, non tender [Ulcers] : no ulcers [Mucositis] : no mucositis [Thrush] : no thrush [de-identified] : supple

## 2024-05-19 NOTE — ASSESSMENT
[FreeTextEntry1] : Aline Serra is a 33 F who was admitted to [facility] 4/2023 with syncope and found to have ITP. Platelet count responded to high dose dexamethasone x 4 days. Has had multiple relapses.  Brief response to rituximab (completed 7/2023), relapsed again in 10/2023.  Did well with a prolonged course of prednisone, but relapsed after prednisone was dc'ed (in late 3/2024).  Now again on prednisone.   Plan: 1. relapsed immune thrombocytopenia --decrease current prednisone dose 40 mg to 30 mg po daily. repeat CBC on or around 5/20/24 at [facility]. If platelet count stable - will reduce prednisone to 20 mg po daily at that time. --discussed long term management of ITP.  We could try to taper prednisone to lowest dose and continue indefinitely, but that carries risk of long term steroid toxicity.  Discussed TPO agonists - good choice to spare exposure to steroids.  however these drugs are not proven to be safe during pregnancy, which could pose a challenge in the future if/when she wants to conceive.  Furthermore, no guarantee of successful treatment with TPO agonist; and lastly, this would be long term/indefinite therapy as well.  Final option is to pursue splenectomy, which may offer her a chance for durable remission. --she will follow up with Dr Cordero for discusssion about splenectomy. 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 --return precautions reviewed - if any bleeding, pre-syncope, spontaneous bruising, etc - ER.  2. Persistent nausea/Slight LFT elevation Possibly 2/2 steroid induced gastritis or ucler. Denies alcohol use, tylenol, or new medications - Trial Pantoprazole 40mg BID for 14 days then 40mg daily after - Will order for abdominal ultrasound complete - Repeat LFTs in 1-2 weeks  3. Easy Gum Bleeding Easy gum bleeding noted for past several months. No change in gum bleeding with normal platelet counts.  - Discussed evaluation with dentist and continue to monitor. No other signs or symptoms of bleeding.  Return to clinic TBD. CBC with diff in 2 weeks. Will call to discuss results. Patient moving home at end of the month to [place] with family.

## 2024-05-19 NOTE — PHYSICAL EXAM
[Normal] : normoactive bowel sounds, soft and nontender, no hepatosplenomegaly or masses appreciated [de-identified] : normal RR, breathing pattern [de-identified] : normal HR, regular [de-identified] : not distended, non tender [Ulcers] : no ulcers [Mucositis] : no mucositis [Thrush] : no thrush [de-identified] : supple

## 2024-05-20 ENCOUNTER — APPOINTMENT (OUTPATIENT)
Dept: ULTRASOUND IMAGING | Facility: CLINIC | Age: 34
End: 2024-05-20
Payer: COMMERCIAL

## 2024-05-20 ENCOUNTER — OUTPATIENT (OUTPATIENT)
Dept: OUTPATIENT SERVICES | Facility: HOSPITAL | Age: 34
LOS: 1 days | End: 2024-05-20

## 2024-05-20 PROCEDURE — 76700 US EXAM ABDOM COMPLETE: CPT | Mod: 26

## 2024-05-28 ENCOUNTER — NON-APPOINTMENT (OUTPATIENT)
Age: 34
End: 2024-05-28

## 2024-05-28 DIAGNOSIS — D69.3 IMMUNE THROMBOCYTOPENIC PURPURA: ICD-10-CM

## 2024-05-28 LAB
ALBUMIN SERPL ELPH-MCNC: 4.4 G/DL
ALP BLD-CCNC: 68 U/L
ALT SERPL-CCNC: 34 U/L
ANION GAP SERPL CALC-SCNC: 19 MMOL/L
AST SERPL-CCNC: 54 U/L
BASOPHILS # BLD AUTO: 0.09 K/UL
BASOPHILS NFR BLD AUTO: 0.9 %
BILIRUB SERPL-MCNC: 0.3 MG/DL
BUN SERPL-MCNC: 7 MG/DL
CALCIUM SERPL-MCNC: 9.2 MG/DL
CHLORIDE SERPL-SCNC: 100 MMOL/L
CO2 SERPL-SCNC: 20 MMOL/L
CREAT SERPL-MCNC: 0.66 MG/DL
EGFR: 120 ML/MIN/1.73M2
EOSINOPHIL # BLD AUTO: 0.1 K/UL
EOSINOPHIL NFR BLD AUTO: 1 %
GLUCOSE SERPL-MCNC: 102 MG/DL
HCT VFR BLD CALC: 41.9 %
HGB BLD-MCNC: 13.8 G/DL
IMM GRANULOCYTES NFR BLD AUTO: 0.4 %
LYMPHOCYTES # BLD AUTO: 3.52 K/UL
LYMPHOCYTES NFR BLD AUTO: 33.6 %
MAN DIFF?: NORMAL
MCHC RBC-ENTMCNC: 31.3 PG
MCHC RBC-ENTMCNC: 32.9 GM/DL
MCV RBC AUTO: 95 FL
MONOCYTES # BLD AUTO: 0.67 K/UL
MONOCYTES NFR BLD AUTO: 6.4 %
NEUTROPHILS # BLD AUTO: 6.06 K/UL
NEUTROPHILS NFR BLD AUTO: 57.7 %
PLATELET # BLD AUTO: 563 K/UL
POTASSIUM SERPL-SCNC: 4.5 MMOL/L
PROT SERPL-MCNC: 7.2 G/DL
RBC # BLD: 4.41 M/UL
RBC # FLD: 13.7 %
SODIUM SERPL-SCNC: 140 MMOL/L
WBC # FLD AUTO: 10.48 K/UL

## 2024-05-30 ENCOUNTER — RX RENEWAL (OUTPATIENT)
Age: 34
End: 2024-05-30

## 2024-05-30 RX ORDER — PREDNISONE 5 MG/1
5 TABLET ORAL
Qty: 60 | Refills: 1 | Status: ACTIVE | COMMUNITY
Start: 2023-12-12 | End: 1900-01-01

## 2024-06-04 NOTE — ED PROVIDER NOTE - CROS ED ROS STATEMENT
Cheilitis Normal Treatment: I recommended application of Vaseline or Aquaphor numerous times a day (as often as every hour) and before going to bed. all other ROS negative except as per HPI

## 2024-06-06 ENCOUNTER — NON-APPOINTMENT (OUTPATIENT)
Age: 34
End: 2024-06-06

## 2024-06-06 ENCOUNTER — APPOINTMENT (OUTPATIENT)
Dept: HEART AND VASCULAR | Facility: CLINIC | Age: 34
End: 2024-06-06
Payer: COMMERCIAL

## 2024-06-06 PROCEDURE — 93298 REM INTERROG DEV EVAL SCRMS: CPT

## 2024-06-24 ENCOUNTER — APPOINTMENT (OUTPATIENT)
Dept: SURGICAL ONCOLOGY | Facility: CLINIC | Age: 34
End: 2024-06-24
Payer: COMMERCIAL

## 2024-06-24 VITALS
OXYGEN SATURATION: 99 % | HEART RATE: 79 BPM | TEMPERATURE: 98.6 F | SYSTOLIC BLOOD PRESSURE: 137 MMHG | WEIGHT: 125 LBS | DIASTOLIC BLOOD PRESSURE: 92 MMHG | HEIGHT: 65 IN | BODY MASS INDEX: 20.83 KG/M2

## 2024-06-24 DIAGNOSIS — Z01.818 ENCOUNTER FOR OTHER PREPROCEDURAL EXAMINATION: ICD-10-CM

## 2024-06-24 PROCEDURE — 99214 OFFICE O/P EST MOD 30 MIN: CPT

## 2024-06-24 RX ORDER — MENINGOCOCCAL (GROUPS A, C, Y AND W-135) OLIGOSACCHARIDE DIPHTHERIA CRM197 CONJUGATE VACCINE 10-5/0.5ML
KIT INTRAMUSCULAR
Qty: 1 | Refills: 0 | Status: ACTIVE | COMMUNITY
Start: 2024-06-24 | End: 1900-01-01

## 2024-06-24 RX ORDER — PNEUMOCOCCAL VACCINE POLYVALENT 25; 25; 25; 25; 25; 25; 25; 25; 25; 25; 25; 25; 25; 25; 25; 25; 25; 25; 25; 25; 25; 25; 25 UG/.5ML; UG/.5ML; UG/.5ML; UG/.5ML; UG/.5ML; UG/.5ML; UG/.5ML; UG/.5ML; UG/.5ML; UG/.5ML; UG/.5ML; UG/.5ML; UG/.5ML; UG/.5ML; UG/.5ML; UG/.5ML; UG/.5ML; UG/.5ML; UG/.5ML; UG/.5ML; UG/.5ML; UG/.5ML; UG/.5ML
25 INJECTION, SOLUTION INTRAMUSCULAR; SUBCUTANEOUS
Qty: 1 | Refills: 0 | Status: ACTIVE | COMMUNITY
Start: 2024-06-24 | End: 1900-01-01

## 2024-06-24 RX ORDER — NEISSERIA MENINGITIDIS SEROGROUP B NHBA FUSION PROTEIN ANTIGEN, NEISSERIA MENINGITIDIS SEROGROUP B FHBP FUSION PROTEIN ANTIGEN AND NEISSERIA MENINGITIDIS SEROGROUP B NADA PROTEIN ANTIGEN 50; 50; 50; 25 UG/.5ML; UG/.5ML; UG/.5ML; UG/.5ML
INJECTION, SUSPENSION INTRAMUSCULAR
Qty: 1 | Refills: 0 | Status: ACTIVE | COMMUNITY
Start: 2024-06-24 | End: 1900-01-01

## 2024-06-27 NOTE — PHYSICAL EXAM
[Normal] : normal breast inspection and palpation of axillas [Normal Female] : normal external genitalia, urethral meatus without lesions or discharge. On bimanual exam uterus, adnexa, parametria all normal without any discrete masses. [Normal] : oriented to person, place and time, with appropriate affect [de-identified] : Elevated BP this visit; PMHx of Heart block

## 2024-06-27 NOTE — ASSESSMENT
[FreeTextEntry1] : Ms. Serra is a 33 y.o. woman with ITP, which has not responded to medical treatment. I discussed this case with Dr. Mathew, who is recommending splenectomy.  The patient is in the process of completing her pre-splenectomy vaccines.  I suggested a robotic splenectomy. During surgery we will make sure to remove her splenule, as well.  Indications for surgery, as well as R/B/A were discussed with the patient, who agreed to proceed. Since she is traveling in August, she would like to schedule her surgery in September. I think this is reasonable.

## 2024-06-27 NOTE — HISTORY OF PRESENT ILLNESS
[de-identified] : Patient Name: Aline Sorto  MRN: [id number]  Referring Provider: Dr. Angelique Mathew Hematologist: Dr. Angelique Mathew Date of Visit: 6/24/24   Diagnosis:  ITP  She presents today to discuss surgical options for treating her ITP. 4/6/23 - diagnosed with low platelets after suffering a syncopal episode and was treated with dexamethasone for presumptive ITP and platelet count improved to normal range. She underwent a thorough workup and smear was consistent with ITP with giant platelets present. 6/2023 - ITP relapse and had a poor response to dexamethasone. She was given Rituximab x 4 (6/15/23-7/7/23) 10/2023 - ITP relapse and started prednisone.  10/19/23 - CT AP no splenomegaly and adenopathy and small accessory splenule 10/23/23 - Bone marrow biopsy and aspirate confirmed diagnosis of ITP. She had a good response to the Prednisone so splenectomy was deferred and Prednisone was tapered down. 4/2024 - ITP relapse when she was off the steroids and they were restarted. She returns today to discuss a splenectomy.  Currently, Ms. SORTO denies abdominal pain and discomfort, denies decreased appetite, nausea or vomiting. She denies changes in bowel habits or recent unintentional weight loss. No fevers, chills, or night sweats.

## 2024-07-09 ENCOUNTER — NON-APPOINTMENT (OUTPATIENT)
Age: 34
End: 2024-07-09

## 2024-07-09 ENCOUNTER — APPOINTMENT (OUTPATIENT)
Dept: HEART AND VASCULAR | Facility: CLINIC | Age: 34
End: 2024-07-09
Payer: COMMERCIAL

## 2024-07-09 VITALS
WEIGHT: 125 LBS | SYSTOLIC BLOOD PRESSURE: 124 MMHG | HEIGHT: 65 IN | BODY MASS INDEX: 20.83 KG/M2 | OXYGEN SATURATION: 98 % | DIASTOLIC BLOOD PRESSURE: 85 MMHG | HEART RATE: 99 BPM | RESPIRATION RATE: 18 BRPM | TEMPERATURE: 98.8 F

## 2024-07-09 DIAGNOSIS — I45.9 CONDUCTION DISORDER, UNSPECIFIED: ICD-10-CM

## 2024-07-09 PROCEDURE — 99213 OFFICE O/P EST LOW 20 MIN: CPT | Mod: 25

## 2024-07-09 PROCEDURE — 93291 INTERROG DEV EVAL SCRMS IP: CPT

## 2024-08-01 ENCOUNTER — NON-APPOINTMENT (OUTPATIENT)
Age: 34
End: 2024-08-01

## 2024-08-01 ENCOUNTER — APPOINTMENT (OUTPATIENT)
Dept: HEART AND VASCULAR | Facility: CLINIC | Age: 34
End: 2024-08-01
Payer: COMMERCIAL

## 2024-08-01 PROCEDURE — 93298 REM INTERROG DEV EVAL SCRMS: CPT

## 2024-08-02 ENCOUNTER — NON-APPOINTMENT (OUTPATIENT)
Age: 34
End: 2024-08-02

## 2024-08-02 DIAGNOSIS — D69.3 IMMUNE THROMBOCYTOPENIC PURPURA: ICD-10-CM

## 2024-08-19 ENCOUNTER — APPOINTMENT (OUTPATIENT)
Dept: SURGICAL ONCOLOGY | Facility: CLINIC | Age: 34
End: 2024-08-19

## 2024-08-21 ENCOUNTER — APPOINTMENT (OUTPATIENT)
Dept: SURGICAL ONCOLOGY | Facility: CLINIC | Age: 34
End: 2024-08-21

## 2024-08-21 DIAGNOSIS — Z71.85 ENCOUNTER FOR IMMUNIZATION SAFETY COUNSELING: ICD-10-CM

## 2024-08-21 DIAGNOSIS — D69.3 IMMUNE THROMBOCYTOPENIC PURPURA: ICD-10-CM

## 2024-08-21 PROCEDURE — 90472 IMMUNIZATION ADMIN EACH ADD: CPT

## 2024-08-21 PROCEDURE — 90620 MENB-4C VACCINE IM: CPT

## 2024-08-21 PROCEDURE — G0009: CPT

## 2024-08-21 PROCEDURE — 90732 PPSV23 VACC 2 YRS+ SUBQ/IM: CPT

## 2024-08-21 NOTE — HISTORY OF PRESENT ILLNESS
[de-identified] : Patient Name: RONDA SORTO  MRN: [id number]  Referring Provider: Dr. Angelique Mathew Oncologist: Dr. Mathew Date: 08/21/2024   Diagnosis: ITP, Pre-op Splenectomy Vaccines  She presents for completion of splenectomy vaccines before her planned splenectomy on 9/4/2024 with Dr. Johnny Cordero. She feels well today and has no complaints. She denies fevers, chills, or any medical changes. She tolerated the first round of vaccines without any side effects.

## 2024-08-21 NOTE — ASSESSMENT
[FreeTextEntry1] : Menveo, Bexsero, and Pneumoccal 23 administered at this time. Aline was advised to contact me if she develops swelling, pain, or erythema at injection site. Also advised to seek medical attention if she develops fevers or chills.

## 2024-08-31 ENCOUNTER — TRANSCRIPTION ENCOUNTER (OUTPATIENT)
Age: 34
End: 2024-08-31

## 2024-09-03 ENCOUNTER — TRANSCRIPTION ENCOUNTER (OUTPATIENT)
Age: 34
End: 2024-09-03

## 2024-09-03 VITALS
HEIGHT: 66 IN | TEMPERATURE: 97 F | OXYGEN SATURATION: 98 % | HEART RATE: 94 BPM | SYSTOLIC BLOOD PRESSURE: 125 MMHG | WEIGHT: 131.18 LBS | RESPIRATION RATE: 16 BRPM | DIASTOLIC BLOOD PRESSURE: 86 MMHG

## 2024-09-03 NOTE — PATIENT PROFILE ADULT - FALL HARM RISK - HARM RISK INTERVENTIONS

## 2024-09-03 NOTE — PATIENT PROFILE ADULT - FUNCTIONAL ASSESSMENT - BASIC MOBILITY 4.
Clindamycin Pregnancy And Lactation Text: This medication can be used in pregnancy if certain situations. Clindamycin is also present in breast milk. 4 = No assist / stand by assistance

## 2024-09-04 ENCOUNTER — RESULT REVIEW (OUTPATIENT)
Age: 34
End: 2024-09-04

## 2024-09-04 ENCOUNTER — INPATIENT (INPATIENT)
Facility: HOSPITAL | Age: 34
LOS: 0 days | Discharge: HOME | DRG: 800 | End: 2024-09-05
Attending: SURGERY | Admitting: SURGERY
Payer: COMMERCIAL

## 2024-09-04 ENCOUNTER — APPOINTMENT (OUTPATIENT)
Dept: SURGICAL ONCOLOGY | Facility: HOSPITAL | Age: 34
End: 2024-09-04

## 2024-09-04 DIAGNOSIS — Z98.890 OTHER SPECIFIED POSTPROCEDURAL STATES: Chronic | ICD-10-CM

## 2024-09-04 DIAGNOSIS — S37.20XA UNSPECIFIED INJURY OF BLADDER, INITIAL ENCOUNTER: ICD-10-CM

## 2024-09-04 LAB
BLD GP AB SCN SERPL QL: NEGATIVE — SIGNIFICANT CHANGE UP
RH IG SCN BLD-IMP: POSITIVE — SIGNIFICANT CHANGE UP

## 2024-09-04 PROCEDURE — S2900 ROBOTIC SURGICAL SYSTEM: CPT | Mod: NC

## 2024-09-04 PROCEDURE — 88189 FLOWCYTOMETRY/READ 16 & >: CPT

## 2024-09-04 PROCEDURE — 88305 TISSUE EXAM BY PATHOLOGIST: CPT | Mod: 26

## 2024-09-04 PROCEDURE — 38120 LAPAROSCOPY SPLENECTOMY: CPT

## 2024-09-04 DEVICE — LIGATING CLIPS WECK HEMOLOK POLYMER MEDIUM-LARGE (GREEN) 6: Type: IMPLANTABLE DEVICE | Status: FUNCTIONAL

## 2024-09-04 DEVICE — XI STAPLER SUREFORM RELOAD 60 WHITE: Type: IMPLANTABLE DEVICE | Status: FUNCTIONAL

## 2024-09-04 RX ORDER — APREPITANT 40 MG/1
40 CAPSULE ORAL ONCE
Refills: 0 | Status: COMPLETED | OUTPATIENT
Start: 2024-09-04 | End: 2024-09-04

## 2024-09-04 RX ORDER — DEXTROAMPHETAMINE SACCHARATE, AMPHETAMINE ASPARTATE MONOHYDRATE, DEXTROAMPHETAMINE SULFATE, AMPHETAMINE SULFATE 6.25; 6.25; 6.25; 6.25 MG/1; MG/1; MG/1; MG/1
1 CAPSULE, EXTENDED RELEASE ORAL
Refills: 0 | DISCHARGE

## 2024-09-04 RX ORDER — PANTOPRAZOLE SODIUM 40 MG
1 TABLET, DELAYED RELEASE (ENTERIC COATED) ORAL
Refills: 0 | DISCHARGE

## 2024-09-04 RX ORDER — PANTOPRAZOLE SODIUM 40 MG
20 TABLET, DELAYED RELEASE (ENTERIC COATED) ORAL DAILY
Refills: 0 | Status: DISCONTINUED | OUTPATIENT
Start: 2024-09-04 | End: 2024-09-05

## 2024-09-04 RX ORDER — HEPARIN SODIUM,BOVINE 1000/ML
5000 VIAL (ML) INJECTION EVERY 8 HOURS
Refills: 0 | Status: DISCONTINUED | OUTPATIENT
Start: 2024-09-04 | End: 2024-09-05

## 2024-09-04 RX ORDER — OXYCODONE HYDROCHLORIDE 5 MG/1
10 TABLET ORAL EVERY 6 HOURS
Refills: 0 | Status: DISCONTINUED | OUTPATIENT
Start: 2024-09-04 | End: 2024-09-05

## 2024-09-04 RX ORDER — HYDROMORPHONE HYDROCHLORIDE 2 MG/1
0.25 TABLET ORAL EVERY 4 HOURS
Refills: 0 | Status: DISCONTINUED | OUTPATIENT
Start: 2024-09-04 | End: 2024-09-05

## 2024-09-04 RX ORDER — ACETAMINOPHEN 325 MG/1
650 TABLET ORAL EVERY 6 HOURS
Refills: 0 | Status: DISCONTINUED | OUTPATIENT
Start: 2024-09-04 | End: 2024-09-05

## 2024-09-04 RX ORDER — PREDNISONE 10 MG
1 TABLET, DOSE PACK ORAL
Refills: 0 | DISCHARGE

## 2024-09-04 RX ORDER — HYDROMORPHONE HYDROCHLORIDE 2 MG/1
0.2 TABLET ORAL
Refills: 0 | Status: DISCONTINUED | OUTPATIENT
Start: 2024-09-04 | End: 2024-09-04

## 2024-09-04 RX ORDER — ESCITALOPRAM OXALATE 10 MG/1
10 TABLET ORAL DAILY
Refills: 0 | Status: DISCONTINUED | OUTPATIENT
Start: 2024-09-04 | End: 2024-09-05

## 2024-09-04 RX ORDER — OXYCODONE HYDROCHLORIDE 5 MG/1
5 TABLET ORAL EVERY 6 HOURS
Refills: 0 | Status: DISCONTINUED | OUTPATIENT
Start: 2024-09-04 | End: 2024-09-05

## 2024-09-04 RX ORDER — ONDANSETRON 2 MG/ML
4 INJECTION, SOLUTION INTRAMUSCULAR; INTRAVENOUS EVERY 6 HOURS
Refills: 0 | Status: DISCONTINUED | OUTPATIENT
Start: 2024-09-04 | End: 2024-09-05

## 2024-09-04 RX ORDER — METOPROLOL TARTRATE 100 MG/1
1 TABLET ORAL
Refills: 0 | DISCHARGE

## 2024-09-04 RX ADMIN — OXYCODONE HYDROCHLORIDE 10 MILLIGRAM(S): 5 TABLET ORAL at 17:18

## 2024-09-04 RX ADMIN — APREPITANT 40 MILLIGRAM(S): 40 CAPSULE ORAL at 07:54

## 2024-09-04 RX ADMIN — HYDROMORPHONE HYDROCHLORIDE 0.2 MILLIGRAM(S): 2 TABLET ORAL at 13:48

## 2024-09-04 RX ADMIN — OXYCODONE HYDROCHLORIDE 10 MILLIGRAM(S): 5 TABLET ORAL at 23:21

## 2024-09-04 RX ADMIN — ACETAMINOPHEN 650 MILLIGRAM(S): 325 TABLET ORAL at 18:20

## 2024-09-04 RX ADMIN — ACETAMINOPHEN 650 MILLIGRAM(S): 325 TABLET ORAL at 23:49

## 2024-09-04 RX ADMIN — OXYCODONE HYDROCHLORIDE 10 MILLIGRAM(S): 5 TABLET ORAL at 22:21

## 2024-09-04 RX ADMIN — HYDROMORPHONE HYDROCHLORIDE 0.25 MILLIGRAM(S): 2 TABLET ORAL at 20:45

## 2024-09-04 RX ADMIN — HYDROMORPHONE HYDROCHLORIDE 0.2 MILLIGRAM(S): 2 TABLET ORAL at 14:03

## 2024-09-04 RX ADMIN — Medication 5000 UNIT(S): at 19:36

## 2024-09-04 RX ADMIN — HYDROMORPHONE HYDROCHLORIDE 0.25 MILLIGRAM(S): 2 TABLET ORAL at 20:04

## 2024-09-04 RX ADMIN — OXYCODONE HYDROCHLORIDE 10 MILLIGRAM(S): 5 TABLET ORAL at 16:18

## 2024-09-04 NOTE — BRIEF OPERATIVE NOTE - OPERATION/FINDINGS
2cm cystotomy repair in 2 layers, negative leak test
robotic assisted splenectomy, patient laying on right side, access abdomen via visaport. spleen identified, dissected with blunt and sharp dissection. splenic artery taken with sureform 60 stapler with white load x 1. hemostasis was achieved. spenule dissected with sharp and blunt dissection. spleen extracorporalized via pfannensteil, bladder injury repaired by urology. peritoneum closed with 1.0 vicryl, fascia closed with #1 PDS. skin closed with monocryl

## 2024-09-04 NOTE — PRE-ANESTHESIA EVALUATION ADULT - NSANTHADDINFOFT_GEN_ALL_CORE
Risks, benefits and alternatives discussed; including but not limited to headache, nausea, bleeding, infection and local trauma/positioning related injury. All questions answered. The patient agrees to proceed.    Plan: GA, 2 PIV, +/- A line
62

## 2024-09-04 NOTE — PROGRESS NOTE ADULT - SUBJECTIVE AND OBJECTIVE BOX
Procedure: Robotic splenectomy and bladder repair   Surgeon: Consuelo    S: Pt has no complaints. Denies CP, SOB, MCKENZIE, calf tenderness. Pain controlled with medication.    O:  T(C): 36.6 (09-04-24 @ 14:45), Max: 36.9 (09-04-24 @ 12:57)  T(F): 97.8 (09-04-24 @ 14:45), Max: 98.4 (09-04-24 @ 12:57)  HR: 93 (09-04-24 @ 15:08) (77 - 93)  BP: 124/84 (09-04-24 @ 15:08) (115/76 - 124/84)  RR: 16 (09-04-24 @ 14:03) (14 - 18)  SpO2: 100% (09-04-24 @ 15:08) (100% - 100%)  Wt(kg): --            Gen: NAD, resting comfortably in bed  C/V: NSR  Pulm: Nonlabored breathing, no respiratory distress  Abd: soft, NT/ND Incision: CDI  : hampton in place with clear yellow urine  Extrem: WWP, no calf edema, SCDs in place      A/P: 33yFemale s/p Robotic splenectomy and bladder repair   Diet: CLD  IVF: LR@75  Pain/nausea control  DVT ppx: SCDs, SQH  Hampton stays for 1 week

## 2024-09-04 NOTE — H&P ADULT - NSHPPHYSICALEXAM_GEN_ALL_CORE
Constitutional: no acute distress  Heart: s1s2  Lungs: no acute distress  Abdomen: soft, nontender, nondistended, without masses, erythema, ecchymosis  Extremities: no edema

## 2024-09-04 NOTE — H&P ADULT - HISTORY OF PRESENT ILLNESS
31 yo F with PMH of anxiety, prolonged QTC, ITP and no PSH presenting for a splenectomy. She had multiple episodes of ITP relapse. Was previously treated with prednisone and Rituximab x 4 (6/15/23-7/7/23) however, relapsed when off steroids.   PLT count 563 in 05/24. CT in Oct 2023 showed a normal sized spleen and a small accesory splenule (inferior to the spleen). Bone marrow bx confirmed diagnosis in Oct 2023. She is currently asymptomatic. 31 yo F with PMH of anxiety, ITP and no PSH presenting for a splenectomy. She had multiple episodes of ITP relapse. Was previously treated with prednisone and Rituximab x 4 (6/15/23-7/7/23) however, relapsed when off steroids.   PLT count 563 in 05/24. CT in Oct 2023 showed a normal sized spleen and a small accesory splenule (inferior to the spleen). Bone marrow bx confirmed diagnosis in Oct 2023. She is currently asymptomatic. 32 yo F with PMH of anxiety, ITP and no PSH presenting for a splenectomy. She had multiple episodes of ITP relapse. Was previously treated with prednisone and Rituximab x 4 (6/15/23-7/7/23) however, relapsed when off steroids.   PLT count 563 in 05/24. CT in Oct 2023 showed a normal sized spleen and a small accesory splenule (inferior to the spleen). Bone marrow bx confirmed diagnosis in Oct 2023. She is currently asymptomatic.

## 2024-09-04 NOTE — BRIEF OPERATIVE NOTE - NSICDXBRIEFPOSTOP_GEN_ALL_CORE_FT
POST-OP DIAGNOSIS:  Idiopathic thrombocytopenia 04-Sep-2024 12:43:14  Erica Winston  
POST-OP DIAGNOSIS:  Bladder injury 04-Sep-2024 11:54:38  Zaid Hung

## 2024-09-04 NOTE — PRE-ANESTHESIA EVALUATION ADULT - NSANTHPMHFT_GEN_ALL_CORE
32 yo F w/ PMH sig for ITP, hx of thrombocytopenia 2/2 ITP, hx of COVID-19, anxiety and ADHD who presents for the above listed procedure.  Of note, patient has family history of QTc prolongation and had a loop recorder placed illustrating type IIa heart block.  No recent history of syncope, S.O.B, or chest pain.

## 2024-09-04 NOTE — BRIEF OPERATIVE NOTE - NSICDXBRIEFPREOP_GEN_ALL_CORE_FT
PRE-OP DIAGNOSIS:  Bladder injury 04-Sep-2024 11:54:34  Zaid Hung  
PRE-OP DIAGNOSIS:  Idiopathic thrombocytopenia 04-Sep-2024 12:43:02  Erica Winston

## 2024-09-05 ENCOUNTER — TRANSCRIPTION ENCOUNTER (OUTPATIENT)
Age: 34
End: 2024-09-05

## 2024-09-05 ENCOUNTER — APPOINTMENT (OUTPATIENT)
Dept: HEART AND VASCULAR | Facility: CLINIC | Age: 34
End: 2024-09-05
Payer: COMMERCIAL

## 2024-09-05 ENCOUNTER — NON-APPOINTMENT (OUTPATIENT)
Age: 34
End: 2024-09-05

## 2024-09-05 VITALS
TEMPERATURE: 98 F | HEART RATE: 74 BPM | RESPIRATION RATE: 17 BRPM | OXYGEN SATURATION: 97 % | DIASTOLIC BLOOD PRESSURE: 69 MMHG | SYSTOLIC BLOOD PRESSURE: 106 MMHG

## 2024-09-05 LAB
ALBUMIN SERPL ELPH-MCNC: 3.9 G/DL — SIGNIFICANT CHANGE UP (ref 3.3–5)
ALP SERPL-CCNC: 70 U/L — SIGNIFICANT CHANGE UP (ref 40–120)
ALT FLD-CCNC: 21 U/L — SIGNIFICANT CHANGE UP (ref 10–45)
ANION GAP SERPL CALC-SCNC: 9 MMOL/L — SIGNIFICANT CHANGE UP (ref 5–17)
AST SERPL-CCNC: 48 U/L — HIGH (ref 10–40)
BILIRUB SERPL-MCNC: 0.4 MG/DL — SIGNIFICANT CHANGE UP (ref 0.2–1.2)
BUN SERPL-MCNC: 4 MG/DL — LOW (ref 7–23)
CALCIUM SERPL-MCNC: 8.6 MG/DL — SIGNIFICANT CHANGE UP (ref 8.4–10.5)
CHLORIDE SERPL-SCNC: 102 MMOL/L — SIGNIFICANT CHANGE UP (ref 96–108)
CO2 SERPL-SCNC: 25 MMOL/L — SIGNIFICANT CHANGE UP (ref 22–31)
CREAT SERPL-MCNC: 0.68 MG/DL — SIGNIFICANT CHANGE UP (ref 0.5–1.3)
EGFR: 119 ML/MIN/1.73M2 — SIGNIFICANT CHANGE UP
GLUCOSE SERPL-MCNC: 126 MG/DL — HIGH (ref 70–99)
HCT VFR BLD CALC: 30.9 % — LOW (ref 34.5–45)
HGB BLD-MCNC: 10.1 G/DL — LOW (ref 11.5–15.5)
MAGNESIUM SERPL-MCNC: 1.8 MG/DL — SIGNIFICANT CHANGE UP (ref 1.6–2.6)
MCHC RBC-ENTMCNC: 32.4 PG — SIGNIFICANT CHANGE UP (ref 27–34)
MCHC RBC-ENTMCNC: 32.7 GM/DL — SIGNIFICANT CHANGE UP (ref 32–36)
MCV RBC AUTO: 99 FL — SIGNIFICANT CHANGE UP (ref 80–100)
NRBC # BLD: 0 /100 WBCS — SIGNIFICANT CHANGE UP (ref 0–0)
PHOSPHATE SERPL-MCNC: 2.5 MG/DL — SIGNIFICANT CHANGE UP (ref 2.5–4.5)
PLATELET # BLD AUTO: 239 K/UL — SIGNIFICANT CHANGE UP (ref 150–400)
POTASSIUM SERPL-MCNC: 3.9 MMOL/L — SIGNIFICANT CHANGE UP (ref 3.5–5.3)
POTASSIUM SERPL-SCNC: 3.9 MMOL/L — SIGNIFICANT CHANGE UP (ref 3.5–5.3)
PROT SERPL-MCNC: 6.1 G/DL — SIGNIFICANT CHANGE UP (ref 6–8.3)
RBC # BLD: 3.12 M/UL — LOW (ref 3.8–5.2)
RBC # FLD: 13.2 % — SIGNIFICANT CHANGE UP (ref 10.3–14.5)
SODIUM SERPL-SCNC: 136 MMOL/L — SIGNIFICANT CHANGE UP (ref 135–145)
WBC # BLD: 15.61 K/UL — HIGH (ref 3.8–10.5)
WBC # FLD AUTO: 15.61 K/UL — HIGH (ref 3.8–10.5)

## 2024-09-05 PROCEDURE — 85027 COMPLETE CBC AUTOMATED: CPT

## 2024-09-05 PROCEDURE — 84100 ASSAY OF PHOSPHORUS: CPT

## 2024-09-05 PROCEDURE — 86850 RBC ANTIBODY SCREEN: CPT

## 2024-09-05 PROCEDURE — 86901 BLOOD TYPING SEROLOGIC RH(D): CPT

## 2024-09-05 PROCEDURE — 86900 BLOOD TYPING SEROLOGIC ABO: CPT

## 2024-09-05 PROCEDURE — 93298 REM INTERROG DEV EVAL SCRMS: CPT

## 2024-09-05 PROCEDURE — S2900: CPT

## 2024-09-05 PROCEDURE — C9399: CPT

## 2024-09-05 PROCEDURE — 88305 TISSUE EXAM BY PATHOLOGIST: CPT

## 2024-09-05 PROCEDURE — C1889: CPT

## 2024-09-05 PROCEDURE — 36415 COLL VENOUS BLD VENIPUNCTURE: CPT

## 2024-09-05 PROCEDURE — 80053 COMPREHEN METABOLIC PANEL: CPT

## 2024-09-05 PROCEDURE — 83735 ASSAY OF MAGNESIUM: CPT

## 2024-09-05 RX ORDER — OXYCODONE HYDROCHLORIDE 5 MG/1
1 TABLET ORAL
Qty: 12 | Refills: 0
Start: 2024-09-05 | End: 2024-09-07

## 2024-09-05 RX ORDER — POTASSIUM PHOSPHATE 236; 224 MG/ML; MG/ML
15 INJECTION, SOLUTION INTRAVENOUS ONCE
Refills: 0 | Status: DISCONTINUED | OUTPATIENT
Start: 2024-09-05 | End: 2024-09-05

## 2024-09-05 RX ORDER — SODIUM PHOSPHATE, DIBASIC, ANHYDROUS, POTASSIUM PHOSPHATE, MONOBASIC, AND SODIUM PHOSPHATE, MONOBASIC, MONOHYDRATE 852; 155; 130 MG/1; MG/1; MG/1
1 TABLET, COATED ORAL ONCE
Refills: 0 | Status: COMPLETED | OUTPATIENT
Start: 2024-09-05 | End: 2024-09-05

## 2024-09-05 RX ORDER — DOCUSATE CALCIUM 240 MG/1
1 CAPSULE ORAL
Qty: 14 | Refills: 0
Start: 2024-09-05 | End: 2024-09-11

## 2024-09-05 RX ADMIN — OXYCODONE HYDROCHLORIDE 10 MILLIGRAM(S): 5 TABLET ORAL at 06:21

## 2024-09-05 RX ADMIN — HYDROMORPHONE HYDROCHLORIDE 0.25 MILLIGRAM(S): 2 TABLET ORAL at 09:15

## 2024-09-05 RX ADMIN — HYDROMORPHONE HYDROCHLORIDE 0.25 MILLIGRAM(S): 2 TABLET ORAL at 08:58

## 2024-09-05 RX ADMIN — OXYCODONE HYDROCHLORIDE 10 MILLIGRAM(S): 5 TABLET ORAL at 05:21

## 2024-09-05 RX ADMIN — OXYCODONE HYDROCHLORIDE 10 MILLIGRAM(S): 5 TABLET ORAL at 17:30

## 2024-09-05 RX ADMIN — Medication 20 MILLIGRAM(S): at 11:16

## 2024-09-05 RX ADMIN — Medication 5000 UNIT(S): at 04:07

## 2024-09-05 RX ADMIN — ACETAMINOPHEN 650 MILLIGRAM(S): 325 TABLET ORAL at 05:12

## 2024-09-05 RX ADMIN — SODIUM PHOSPHATE, DIBASIC, ANHYDROUS, POTASSIUM PHOSPHATE, MONOBASIC, AND SODIUM PHOSPHATE, MONOBASIC, MONOHYDRATE 1 PACKET(S): 852; 155; 130 TABLET, COATED ORAL at 08:58

## 2024-09-05 RX ADMIN — Medication 5000 UNIT(S): at 11:16

## 2024-09-05 RX ADMIN — ACETAMINOPHEN 650 MILLIGRAM(S): 325 TABLET ORAL at 14:10

## 2024-09-05 RX ADMIN — ESCITALOPRAM OXALATE 10 MILLIGRAM(S): 10 TABLET ORAL at 11:16

## 2024-09-05 RX ADMIN — OXYCODONE HYDROCHLORIDE 10 MILLIGRAM(S): 5 TABLET ORAL at 11:24

## 2024-09-05 RX ADMIN — Medication 100 GRAM(S): at 08:58

## 2024-09-05 RX ADMIN — OXYCODONE HYDROCHLORIDE 10 MILLIGRAM(S): 5 TABLET ORAL at 12:24

## 2024-09-05 NOTE — DISCHARGE NOTE NURSING/CASE MANAGEMENT/SOCIAL WORK - PATIENT PORTAL LINK FT
You can access the FollowMyHealth Patient Portal offered by [facility] by registering at the following website: [email or web address]. By joining [facility]’s FollowMyHealth portal, you will also be able to view your health information using other applications (apps) compatible with our system.

## 2024-09-05 NOTE — DISCHARGE NOTE NURSING/CASE MANAGEMENT/SOCIAL WORK - NSDCFUADDAPPT_GEN_ALL_CORE_FT
Please note, you have a scheduled appointment with your surgeon, Dr. Cordero on 9/16/24 for follow-up after discharge from the hospital.    Please schedule a follow-up appointment with Dr. Cheng on 9/13 at the  clinic for cystogram and catheter pull. You  may reach his office at [phone] at your earliest convenience.

## 2024-09-05 NOTE — DISCHARGE NOTE PROVIDER - NSDCCPCAREPLAN_GEN_ALL_CORE_FT
PRINCIPAL DISCHARGE DIAGNOSIS  Diagnosis: Idiopathic thrombocytopenia  Assessment and Plan of Treatment:       SECONDARY DISCHARGE DIAGNOSES  Diagnosis: Bladder injury  Assessment and Plan of Treatment:

## 2024-09-05 NOTE — DISCHARGE NOTE PROVIDER - NSDCCPTREATMENT_GEN_ALL_CORE_FT
PRINCIPAL PROCEDURE  Procedure: Robot-assisted laparoscopic splenectomy  Findings and Treatment:       SECONDARY PROCEDURE  Procedure: Bladder repair  Findings and Treatment:

## 2024-09-05 NOTE — DISCHARGE NOTE PROVIDER - NSDCMRMEDTOKEN_GEN_ALL_CORE_FT
Adderall 20 mg oral tablet: 1 tab(s) orally once a day  Lexapro 10 mg oral tablet: 1 orally once a day  Loestrin 21 1.5/30 oral tablet: 1 orally once a day  metoprolol tartrate 25 mg oral tablet: 1 tab(s) orally prn as needed for  anxiety  pantoprazole 20 mg oral delayed release tablet: 1 tab(s) orally once a day  predniSONE 10 mg oral tablet: 1 tab(s) orally once a day

## 2024-09-05 NOTE — DISCHARGE NOTE PROVIDER - CARE PROVIDER_API CALL
Khoa Cordero  Surgery  [address] [address]  Phone: [phone]  Fax: [phone]  Follow Up Time:     Tom Cheng  Urology  [address] [place] [address], [address] [place] [id number]  Phone: [phone]  Fax: [phone]  Follow Up Time:

## 2024-09-05 NOTE — DISCHARGE NOTE PROVIDER - NSDCFUSCHEDAPPT_GEN_ALL_CORE_FT
Khoa Cordero  [facility]  SURGONC [address]  Scheduled Appointment: 09/16/2024    Jordon Landa  [facility]  HEARTVASC [address]  Scheduled Appointment: 10/02/2024

## 2024-09-05 NOTE — DISCHARGE NOTE PROVIDER - NSDCFUADDINST_GEN_ALL_CORE_FT
For pain management at home, please use over-the-counter tylenol/motrin every 6 hours as needed. DO NOT exceed a max daily dose of 4g for Tylenol. In case of breakthrough pain, you may take oxycodone as instructed. Please take colace twice a day to mitigate the side effects of constipation from oxycodone.    Beth catheter care instructions:  - Keep the drainage bag below the level of your bladder and off the floor at all times.  - Keep the catheter secured to your thigh to prevent it from moving.  - Don’t lie on your catheter or block the flow of urine in the tubing.  - Shower daily to keep the catheter clean.  - Clean your hands before and after touching the catheter or bag.    General Discharge Instructions:  Please resume all regular home medications unless specifically advised not to take a particular medication. Also, please take any new medications as prescribed.  Please get plenty of rest, continue to ambulate several times per day, and drink adequate amounts of fluids. Avoid lifting weights greater than 5-10 lbs until you follow-up with your surgeon, who will instruct you further regarding activity restrictions.  Avoid driving or operating heavy machinery while taking pain medications.  Please follow-up with your surgeon and Primary Care Provider (PCP) as advised.  Incision Care:  *Please call your doctor or nurse practitioner if you have increased pain, swelling, redness, or drainage from the incision site.  *Avoid swimming and baths until your follow-up appointment.  *You may shower, and wash surgical incisions with a mild soap and warm water. Gently pat the area dry.  *If you have staples, they will be removed at your follow-up appointment.  *If you have steri-strips, they will fall off on their own. Please remove any remaining strips 7-10 days after surgery.    Warning Signs:  Please call your doctor or nurse practitioner if you experience the following:  *You experience new chest pain, pressure, squeezing or tightness.  *New or worsening cough, shortness of breath, or wheeze.  *If you are vomiting and cannot keep down fluids or your medications.  *You are getting dehydrated due to continued vomiting, diarrhea, or other reasons. Signs of dehydration include dry mouth, rapid heartbeat, or feeling dizzy or faint when standing.  *You see blood or dark/black material when you vomit or have a bowel movement.  *You experience burning when you urinate, have blood in your urine, or experience a discharge.  *Your pain is not improving within 8-12 hours or is not gone within 24 hours. Call or return immediately if your pain is getting worse, changes location, or moves to your chest or back.  *You have shaking chills, or fever greater than 101.5 degrees Fahrenheit or 38 degrees Celsius.  *Any change in your symptoms, or any new symptoms that concern you.

## 2024-09-05 NOTE — DISCHARGE NOTE PROVIDER - NSDCFUADDAPPT_GEN_ALL_CORE_FT
Please note, you have a scheduled appointment with your surgeon, Dr. Cordero on 9/16/24 for follow-up after discharge from the hospital.    Please schedule a follow-up appointment with Dr. Cheng on 9/13 at the  clinic for cystogram and catheter pull. You  may reach his office at [phone] at your earliest convenience.

## 2024-09-05 NOTE — PROGRESS NOTE ADULT - ASSESSMENT
32 yo F with PMH of anxiety, ITP and no PSH presenting for a splenectomy. Now s/p robotic splenectomy w/ bladder repair on 9/4    CLD  Pain/Nausea control PRN  HSQ/SCD  IS/OOBA  Beth for 1 week  AM labs 32 yo F with PMH of anxiety, ITP and no PSH presenting for a splenectomy. Now s/p robotic splenectomy w/ bladder repair on 9/4    Reg  Pain/Nausea control PRN  HSQ/SCD  IS/OOBA  Beth for 1 week  AM labs  possible dc today if brian reg diet

## 2024-09-05 NOTE — PROGRESS NOTE ADULT - SUBJECTIVE AND OBJECTIVE BOX
UROLOGY PROGRESS NOTE    SUBJECTIVE: Patient seen and examined bedside. Patient denies fevers/chills, HA/dizziness, nausea/vomiting, CP/SOB, and ab/flank pain. Ambulating, urinating and having regular bowel movements. tolerating CLD>     PHYSICAL EXAM    General: NAD, resting comfortably in bed  C/V: NSR  Pulm: Nonlabored breathing, no respiratory distress on room air  Abd: soft, ND/NT, no rebound tenderness, no guarding, no flank TTP. incisions c/d/i  : hampton draining clear yellow urine.  Extrem: WWP, no edema, SCDs in place

## 2024-09-05 NOTE — DISCHARGE NOTE PROVIDER - HOSPITAL COURSE
Patient is a 32 yo F with PMH of anxiety, ITP, ruptured ovarian cyst, vitamin D deficiency, COVID-19 x2, and no PSH, who presented to [facility] for elective splenectomy. She had multiple episodes of ITP relapse. Was previously treated with prednisone and Rituximab x 4 (6/15/23-7/7/23) however, relapsed when off steroids.   PLT count 563 in 05/24. 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 Bone marrow bx confirmed diagnosis in Oct 2023. On this admission, patient was asymptomatic. Patient was taken to the OR on 9/4 for a robot-assisted laparoscopic splenectomy. Intraoperatively, urachal cyst was removed and urology came in for a bladder repair. No francisco-operative complications were noted. Hampton to remain for 1 week post-op. Diet was advanced as tolerated.    At time of discharge pt is tolerating diet, pain well controlled, pt is ambulating/voiding appropriately via hampton, and having adequate bowel function. Pt is hemodynamically normal/stable and medically ready for discharge with outpatient follow-up.

## 2024-09-05 NOTE — PROGRESS NOTE ADULT - SUBJECTIVE AND OBJECTIVE BOX
INTERVAL HPI/OVERNIGHT EVENTS: NAEON    STATUS POST:  9/4: RA splenectomy, bladder repair    POST OPERATIVE DAY #: 1    SUBJECTIVE: Pt seen and examined by chief resident. Pt is doing well, resting comfortably on bed. Pain controlled. Diet tolerated. Ambulating out of bed. F/BM. No nausea or vomiting. No complaints at this time.    MEDICATIONS  (STANDING):  acetaminophen     Tablet .. 650 milliGRAM(s) Oral every 6 hours  escitalopram 10 milliGRAM(s) Oral daily  heparin   Injectable 5000 Unit(s) SubCutaneous every 8 hours  pantoprazole  Injectable 20 milliGRAM(s) IV Push daily    MEDICATIONS  (PRN):  HYDROmorphone  Injectable 0.25 milliGRAM(s) IV Push every 4 hours PRN breakthrough pain  ondansetron Injectable 4 milliGRAM(s) IV Push every 6 hours PRN Nausea and/or Vomiting  oxyCODONE    IR 5 milliGRAM(s) Oral every 6 hours PRN Moderate Pain (4 - 6)  oxyCODONE    IR 10 milliGRAM(s) Oral every 6 hours PRN Severe Pain (7 - 10)      Vital Signs Last 24 Hrs  T(C): 36.6 (05 Sep 2024 05:12), Max: 37 (04 Sep 2024 17:20)  T(F): 97.9 (05 Sep 2024 05:12), Max: 98.6 (04 Sep 2024 17:20)  HR: 78 (05 Sep 2024 05:12) (70 - 94)  BP: 121/83 (05 Sep 2024 05:12) (110/67 - 129/87)  BP(mean): 87 (04 Sep 2024 20:45) (87 - 98)  RR: 17 (05 Sep 2024 05:12) (14 - 18)  SpO2: 98% (05 Sep 2024 05:12) (94% - 100%)    Parameters below as of 05 Sep 2024 05:12  Patient On (Oxygen Delivery Method): room air        PHYSICAL EXAM:      Constitutional: A&Ox3  Respiratory: non labored breathing, no respiratory distress    Cardiovascular: NSR, RRR    Gastrointestinal:                 Incision:    Genitourinary:    Extremities: (-) edema                  I&O's Detail    04 Sep 2024 07:01  -  05 Sep 2024 06:26  --------------------------------------------------------  IN:    Lactated Ringers: 150 mL  Total IN: 150 mL    OUT:    Indwelling Catheter - Urethral (mL): 1950 mL  Total OUT: 1950 mL    Total NET: -1800 mL          LABS:                RADIOLOGY & ADDITIONAL STUDIES: INTERVAL HPI/OVERNIGHT EVENTS: NAEON    STATUS POST:  9/4: RA splenectomy, bladder repair    POST OPERATIVE DAY #: 1    SUBJECTIVE: Pt seen and examined by chief resident. Pt endorsing gas pain and mild incisional pain. Diet tolerated. No nausea or vomiting.    MEDICATIONS  (STANDING):  acetaminophen     Tablet .. 650 milliGRAM(s) Oral every 6 hours  escitalopram 10 milliGRAM(s) Oral daily  heparin   Injectable 5000 Unit(s) SubCutaneous every 8 hours  pantoprazole  Injectable 20 milliGRAM(s) IV Push daily    MEDICATIONS  (PRN):  HYDROmorphone  Injectable 0.25 milliGRAM(s) IV Push every 4 hours PRN breakthrough pain  ondansetron Injectable 4 milliGRAM(s) IV Push every 6 hours PRN Nausea and/or Vomiting  oxyCODONE    IR 5 milliGRAM(s) Oral every 6 hours PRN Moderate Pain (4 - 6)  oxyCODONE    IR 10 milliGRAM(s) Oral every 6 hours PRN Severe Pain (7 - 10)      Vital Signs Last 24 Hrs  T(C): 36.6 (05 Sep 2024 05:12), Max: 37 (04 Sep 2024 17:20)  T(F): 97.9 (05 Sep 2024 05:12), Max: 98.6 (04 Sep 2024 17:20)  HR: 78 (05 Sep 2024 05:12) (70 - 94)  BP: 121/83 (05 Sep 2024 05:12) (110/67 - 129/87)  BP(mean): 87 (04 Sep 2024 20:45) (87 - 98)  RR: 17 (05 Sep 2024 05:12) (14 - 18)  SpO2: 98% (05 Sep 2024 05:12) (94% - 100%)    Parameters below as of 05 Sep 2024 05:12  Patient On (Oxygen Delivery Method): room air        PHYSICAL EXAM:      Constitutional: A&Ox3  Respiratory: non labored breathing, no respiratory distress  Cardiovascular: NSR, RRR  Gastrointestinal: abd soft, mildly TTP around incisions, mildly distended                 Incision: C/D/I with dermabond in place  : hampton with clear outpt  Extremities: (-) edema, wwp                  I&O's Detail    04 Sep 2024 07:01  -  05 Sep 2024 06:26  --------------------------------------------------------  IN:    Lactated Ringers: 150 mL  Total IN: 150 mL    OUT:    Indwelling Catheter - Urethral (mL): 1950 mL  Total OUT: 1950 mL    Total NET: -1800 mL          LABS:                RADIOLOGY & ADDITIONAL STUDIES:

## 2024-09-05 NOTE — PROGRESS NOTE ADULT - ASSESSMENT
32 yo F with PMH of anxiety, ITP and no PSH presenting for a splenectomy. Now s/p robotic splenectomy w/ bladder repair on 9/4.     9/5: feeling well today. AVSS. UOP is appropriate for weight and clear yellow. Pending advancement of diet and discharge today. Labs WNL for post operative patient.     - no acute urologic intervention  - Maintain hampton catheter for 10-14 days  - follow up in clinic for cystogram in office and TOV prior to catheter removal  - Please follow up with the Urology Clinic within 1-2 weeks of discharge.  Call [phone] to schedule your appointment.  The office is located at [address] [place] [address], [address] [place] [address].   - discussed with attending, Urology team signing off at this time  - Appreciate excellent care per primary, please reconsult Urology team for further questions about this patient's care

## 2024-09-06 ENCOUNTER — NON-APPOINTMENT (OUTPATIENT)
Age: 34
End: 2024-09-06

## 2024-09-06 DIAGNOSIS — D69.3 IMMUNE THROMBOCYTOPENIC PURPURA: ICD-10-CM

## 2024-09-06 LAB — SURGICAL PATHOLOGY STUDY: SIGNIFICANT CHANGE UP

## 2024-09-09 PROBLEM — D69.3 IMMUNE THROMBOCYTOPENIC PURPURA: Chronic | Status: ACTIVE | Noted: 2024-09-03

## 2024-09-09 PROBLEM — I44.1 ATRIOVENTRICULAR BLOCK, SECOND DEGREE: Chronic | Status: ACTIVE | Noted: 2024-09-03
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
## 2024-09-11 DIAGNOSIS — N99.72 ACCIDENTAL PUNCTURE AND LACERATION OF A GENITOURINARY SYSTEM ORGAN OR STRUCTURE DURING OTHER PROCEDURE: ICD-10-CM

## 2024-09-11 DIAGNOSIS — Z86.16 PERSONAL HISTORY OF COVID-19: ICD-10-CM

## 2024-09-11 DIAGNOSIS — F90.9 ATTENTION-DEFICIT HYPERACTIVITY DISORDER, UNSPECIFIED TYPE: ICD-10-CM
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
## 2024-09-11 DIAGNOSIS — D69.3 IMMUNE THROMBOCYTOPENIC PURPURA: ICD-10-CM

## 2024-09-11 DIAGNOSIS — R94.31 ABNORMAL ELECTROCARDIOGRAM [ECG] [EKG]: ICD-10-CM

## 2024-09-11 DIAGNOSIS — F41.9 ANXIETY DISORDER, UNSPECIFIED: ICD-10-CM

## 2024-09-13 NOTE — PROGRESS NOTE ADULT - TIME-BASED BILLING (NON-CRITICAL CARE)
Time-based billing (NON-critical care)
[de-identified] : Spine: Inspection/Palpation: No tenderness to palpation throughout Cervical/thoracic/lumbar spine. No bony stepoffs, No lesions.  Gait: antalgic, able to perform bilateral toe and heel rise.     Neurologic:  Bilateral Lower Extremities 5/5 Iliopsoas/Quadriceps/Hamstrings/ Tibialis Anterior/ Gastrocnemius. Extensor Hallucis Longus/ Flexor Hallucis Longus except    Sensation intact to light touch L2-S1  X-ray Ap/Lateral/Flexion/Extension of lumbar spine were viewed and interpreted. Multilevel disc degeneration.  Severe disc height loss.  With L4-5 spondylolisthesis

## 2024-09-16 ENCOUNTER — NON-APPOINTMENT (OUTPATIENT)
Age: 34
End: 2024-09-16

## 2024-09-16 ENCOUNTER — LABORATORY RESULT (OUTPATIENT)
Age: 34
End: 2024-09-16

## 2024-09-16 ENCOUNTER — APPOINTMENT (OUTPATIENT)
Dept: SURGICAL ONCOLOGY | Facility: CLINIC | Age: 34
End: 2024-09-16
Payer: COMMERCIAL

## 2024-09-16 VITALS
TEMPERATURE: 97.9 F | WEIGHT: 136 LBS | SYSTOLIC BLOOD PRESSURE: 114 MMHG | HEART RATE: 88 BPM | HEIGHT: 65 IN | OXYGEN SATURATION: 98 % | DIASTOLIC BLOOD PRESSURE: 78 MMHG | BODY MASS INDEX: 22.66 KG/M2

## 2024-09-16 DIAGNOSIS — Z90.81 ACQUIRED ABSENCE OF SPLEEN: ICD-10-CM

## 2024-09-16 PROCEDURE — 99024 POSTOP FOLLOW-UP VISIT: CPT

## 2024-09-16 NOTE — HISTORY OF PRESENT ILLNESS
[FreeTextEntry1] : Patient Name: RONDA SORTO  MRN: [id number]  Referring Provider: Dr. Mathew Oncologist: Dr. Mathew Date: 09/16/2024   Diagnosis: ITP Operative Date: 9/4/24 Procedure: Splenectomy  She presents for a scheduled post operative visit. She is 12 days post op. She had her hampton removed last week and has been urinating without difficulties.  Currently, Ms. SORTO feels well. She occasionally has some slight abdominal discomfort over the incisions if she is in the car. She has been using Tylenol PRN. No fevers, denies changes to bowel habits or appetite.  Final Pathology Showed: No evidence of lymphoma or carcinoma

## 2024-09-16 NOTE — ASSESSMENT
[FreeTextEntry1] : Ms. Serra is recovering well from her splenectomy. She is tolerating a regular diet and having normal bowel movements. Otherwise, no N/V. Minimal pain at the incision sites, improving.  Foly catheter has been removed and she is urinating without issues.  Standard post-op recommendations given, including no heavy lifting for 6 weeks. Path is benign. She will follow up with Dr. Mathew. RTC PRN

## 2024-09-16 NOTE — HISTORY OF PRESENT ILLNESS
[FreeTextEntry1] : Patient Name: RONDA SORTO  MRN: [id number]  Referring Provider: Dr. Mathew Oncologist: Dr. Mathew Date: 09/16/2024   Diagnosis: ITP Operative Date: 9/4/24 Procedure: Splenectomy  She presents for a scheduled post operative visit. She is 12 days post op. She had her hampton removed last week and has been urinating without difficulties.  Currently, Ms. SORTO feels well. She occasionally has some slight abdominal discomfort over the incisions if she is in the car. She has been using Tylenol PRN. No fevers, denies changes to bowel habits or appetite.  Final Pathology Showed: No evidence of lymphoma or carcinoma

## 2024-09-16 NOTE — PHYSICAL EXAM
[Normal] : well developed, well nourished, in no acute distress [de-identified] : soft, non tender, surgical sites are healing well without any signs of infection

## 2024-09-16 NOTE — PHYSICAL EXAM
[Normal] : well developed, well nourished, in no acute distress [de-identified] : soft, non tender, surgical sites are healing well without any signs of infection

## 2024-09-17 ENCOUNTER — NON-APPOINTMENT (OUTPATIENT)
Age: 34
End: 2024-09-17

## 2024-09-17 DIAGNOSIS — D69.3 IMMUNE THROMBOCYTOPENIC PURPURA: ICD-10-CM

## 2024-11-12 DIAGNOSIS — D69.3 IMMUNE THROMBOCYTOPENIC PURPURA: ICD-10-CM

## 2024-12-10 ENCOUNTER — APPOINTMENT (OUTPATIENT)
Dept: HEART AND VASCULAR | Facility: CLINIC | Age: 34
End: 2024-12-10
Payer: SELF-PAY

## 2024-12-10 ENCOUNTER — NON-APPOINTMENT (OUTPATIENT)
Age: 34
End: 2024-12-10

## 2024-12-10 PROCEDURE — 93298 REM INTERROG DEV EVAL SCRMS: CPT

## 2025-01-14 ENCOUNTER — APPOINTMENT (OUTPATIENT)
Dept: HEART AND VASCULAR | Facility: CLINIC | Age: 35
End: 2025-01-14
Payer: SELF-PAY

## 2025-01-14 ENCOUNTER — NON-APPOINTMENT (OUTPATIENT)
Age: 35
End: 2025-01-14

## 2025-01-14 PROCEDURE — 93298 REM INTERROG DEV EVAL SCRMS: CPT

## 2025-01-29 ENCOUNTER — APPOINTMENT (OUTPATIENT)
Dept: HEMATOLOGY ONCOLOGY | Facility: CLINIC | Age: 35
End: 2025-01-29
Payer: COMMERCIAL

## 2025-01-29 VITALS
SYSTOLIC BLOOD PRESSURE: 124 MMHG | WEIGHT: 144 LBS | OXYGEN SATURATION: 97 % | BODY MASS INDEX: 23.99 KG/M2 | HEART RATE: 96 BPM | TEMPERATURE: 98.4 F | RESPIRATION RATE: 18 BRPM | HEIGHT: 65 IN | DIASTOLIC BLOOD PRESSURE: 81 MMHG

## 2025-01-29 DIAGNOSIS — Z01.818 ENCOUNTER FOR OTHER PREPROCEDURAL EXAMINATION: ICD-10-CM

## 2025-01-29 DIAGNOSIS — Q89.09 CONGENITAL MALFORMATIONS OF SPLEEN: ICD-10-CM

## 2025-01-29 DIAGNOSIS — D69.3 IMMUNE THROMBOCYTOPENIC PURPURA: ICD-10-CM

## 2025-01-29 PROCEDURE — 99213 OFFICE O/P EST LOW 20 MIN: CPT

## 2025-01-30 NOTE — SBIRT NOTE ADULT - NSSBIRTINJURYRES_GEN_A_CORE
Chart reviewed and updated. Reconciled immunizations, health maintenance and care everywhere.  Sent INA to [facility] at [phone] to request colonoscopy from 10-15 years ago for PCP.      Irene Jacinto [facility]  Panel Care Coordinator  [facility]  [phone]  For Dale Wu MD      
No

## 2025-02-14 ENCOUNTER — APPOINTMENT (OUTPATIENT)
Dept: HEART AND VASCULAR | Facility: CLINIC | Age: 35
End: 2025-02-14

## 2025-02-14 ENCOUNTER — NON-APPOINTMENT (OUTPATIENT)
Age: 35
End: 2025-02-14

## 2025-02-14 PROCEDURE — 93298 REM INTERROG DEV EVAL SCRMS: CPT

## 2025-03-05 NOTE — ED ADULT TRIAGE NOTE - STATUS:
Applied
I acted within this role throughout the entirety of the procedure performed by the primary surgeon

## 2025-03-11 ENCOUNTER — NON-APPOINTMENT (OUTPATIENT)
Age: 35
End: 2025-03-11

## 2025-03-19 ENCOUNTER — NON-APPOINTMENT (OUTPATIENT)
Age: 35
End: 2025-03-19

## 2025-03-19 ENCOUNTER — APPOINTMENT (OUTPATIENT)
Dept: HEART AND VASCULAR | Facility: CLINIC | Age: 35
End: 2025-03-19
Payer: COMMERCIAL

## 2025-03-19 VITALS
TEMPERATURE: 97.7 F | OXYGEN SATURATION: 99 % | BODY MASS INDEX: 23.99 KG/M2 | DIASTOLIC BLOOD PRESSURE: 84 MMHG | WEIGHT: 144 LBS | SYSTOLIC BLOOD PRESSURE: 142 MMHG | HEART RATE: 104 BPM | HEIGHT: 65 IN

## 2025-03-19 DIAGNOSIS — I45.9 CONDUCTION DISORDER, UNSPECIFIED: ICD-10-CM

## 2025-03-19 PROCEDURE — 93291 INTERROG DEV EVAL SCRMS IP: CPT

## 2025-04-21 ENCOUNTER — APPOINTMENT (OUTPATIENT)
Dept: HEART AND VASCULAR | Facility: CLINIC | Age: 35
End: 2025-04-21
Payer: COMMERCIAL

## 2025-04-21 ENCOUNTER — NON-APPOINTMENT (OUTPATIENT)
Age: 35
End: 2025-04-21

## 2025-04-21 PROCEDURE — 93298 REM INTERROG DEV EVAL SCRMS: CPT

## 2025-05-22 ENCOUNTER — APPOINTMENT (OUTPATIENT)
Dept: HEART AND VASCULAR | Facility: CLINIC | Age: 35
End: 2025-05-22
Payer: COMMERCIAL

## 2025-05-22 ENCOUNTER — NON-APPOINTMENT (OUTPATIENT)
Age: 35
End: 2025-05-22

## 2025-05-22 PROCEDURE — 93298 REM INTERROG DEV EVAL SCRMS: CPT

## 2025-06-26 ENCOUNTER — APPOINTMENT (OUTPATIENT)
Dept: HEART AND VASCULAR | Facility: CLINIC | Age: 35
End: 2025-06-26
Payer: COMMERCIAL

## 2025-06-26 ENCOUNTER — NON-APPOINTMENT (OUTPATIENT)
Age: 35
End: 2025-06-26

## 2025-06-26 PROCEDURE — 93298 REM INTERROG DEV EVAL SCRMS: CPT

## 2025-07-31 ENCOUNTER — APPOINTMENT (OUTPATIENT)
Dept: HEART AND VASCULAR | Facility: CLINIC | Age: 35
End: 2025-07-31
Payer: COMMERCIAL

## 2025-07-31 ENCOUNTER — NON-APPOINTMENT (OUTPATIENT)
Age: 35
End: 2025-07-31

## 2025-07-31 PROCEDURE — 93298 REM INTERROG DEV EVAL SCRMS: CPT

## 2025-07-31 NOTE — DISCHARGE NOTE PROVIDER - NSDCADMDATE_GEN_ALL_CORE_FT
Detail Level: Detailed Quality 431: Preventive Care And Screening: Unhealthy Alcohol Use - Screening: Patient not identified as an unhealthy alcohol user when screened for unhealthy alcohol use using a systematic screening method Quality 226: Preventive Care And Screening: Tobacco Use: Screening And Cessation Intervention: Patient screened for tobacco use and is an ex/non-smoker 04-Sep-2024 06:51

## 2025-08-28 ENCOUNTER — APPOINTMENT (OUTPATIENT)
Dept: OBGYN | Facility: CLINIC | Age: 35
End: 2025-08-28
Payer: COMMERCIAL

## 2025-08-28 ENCOUNTER — NON-APPOINTMENT (OUTPATIENT)
Age: 35
End: 2025-08-28

## 2025-08-28 VITALS
HEART RATE: 92 BPM | WEIGHT: 135 LBS | SYSTOLIC BLOOD PRESSURE: 107 MMHG | OXYGEN SATURATION: 99 % | DIASTOLIC BLOOD PRESSURE: 87 MMHG | BODY MASS INDEX: 22.47 KG/M2

## 2025-08-28 DIAGNOSIS — Z78.9 OTHER SPECIFIED HEALTH STATUS: ICD-10-CM

## 2025-08-28 DIAGNOSIS — D69.3 IMMUNE THROMBOCYTOPENIC PURPURA: ICD-10-CM

## 2025-08-28 DIAGNOSIS — R87.610 ATYPICAL SQUAMOUS CELLS OF UNDETERMINED SIGNIFICANCE ON CYTOLOGIC SMEAR OF CERVIX (ASC-US): ICD-10-CM

## 2025-08-28 DIAGNOSIS — Z30.09 ENCOUNTER FOR OTHER GENERAL COUNSELING AND ADVICE ON CONTRACEPTION: ICD-10-CM

## 2025-08-28 DIAGNOSIS — R87.810 ATYPICAL SQUAMOUS CELLS OF UNDETERMINED SIGNIFICANCE ON CYTOLOGIC SMEAR OF CERVIX (ASC-US): ICD-10-CM

## 2025-08-28 DIAGNOSIS — Z71.85 ENCOUNTER FOR IMMUNIZATION SAFETY COUNSELING: ICD-10-CM

## 2025-08-28 PROCEDURE — 99204 OFFICE O/P NEW MOD 45 MIN: CPT

## 2025-09-02 LAB — HPV HIGH+LOW RISK DNA PNL CVX: NOT DETECTED

## 2025-09-04 ENCOUNTER — TRANSCRIPTION ENCOUNTER (OUTPATIENT)
Age: 35
End: 2025-09-04

## 2025-09-04 LAB — CYTOLOGY CVX/VAG DOC THIN PREP: NORMAL

## 2025-09-11 ENCOUNTER — TRANSCRIPTION ENCOUNTER (OUTPATIENT)
Age: 35
End: 2025-09-11

## 2025-09-17 ENCOUNTER — APPOINTMENT (OUTPATIENT)
Dept: HEART AND VASCULAR | Facility: CLINIC | Age: 35
End: 2025-09-17
Payer: COMMERCIAL

## 2025-09-17 VITALS
SYSTOLIC BLOOD PRESSURE: 116 MMHG | WEIGHT: 135 LBS | HEIGHT: 65 IN | DIASTOLIC BLOOD PRESSURE: 78 MMHG | HEART RATE: 140 BPM | BODY MASS INDEX: 22.49 KG/M2

## 2025-09-17 DIAGNOSIS — I45.9 CONDUCTION DISORDER, UNSPECIFIED: ICD-10-CM

## 2025-09-17 PROCEDURE — 93291 INTERROG DEV EVAL SCRMS IP: CPT

## (undated) DEVICE — D HELP - CLEARVIEW CLEARIFY SYSTEM

## (undated) DEVICE — WARMING BLANKET FULL ADULT

## (undated) DEVICE — FOLEY TRAY 16FR 5CC LF UMETER CLOSED

## (undated) DEVICE — VENODYNE/SCD SLEEVE CALF MEDIUM

## (undated) DEVICE — STAPLER SKIN PROXIMATE

## (undated) DEVICE — SUT BOOT STANDARD (ORIGINAL YELLOW) 5 PAIR

## (undated) DEVICE — POSITIONER PINK PAD PIGAZZI SYSTEM XL W ARM PROTECTOR

## (undated) DEVICE — CLIP APPLR DISP 5MM

## (undated) DEVICE — INSUFFLATION NDL COVIDIEN SURGINEEDLE VERESS 120MM

## (undated) DEVICE — VESSEL LOOP MAXI-BLUE 0.120" X 16"

## (undated) DEVICE — XI DRAPE ARM

## (undated) DEVICE — XI VESSEL SEALER

## (undated) DEVICE — XI TIP COVER

## (undated) DEVICE — DRSG DERMABOND 0.7ML

## (undated) DEVICE — XI ENDOWRIST 12 - 8 MM CANNULA REDUCER

## (undated) DEVICE — SUCTION YANKAUER BULBOUS TIP W VENT

## (undated) DEVICE — SUT PLEDGET CV FELT SQ PTFE 8 X 8MM

## (undated) DEVICE — XI OBTURATOR OPTICAL BLADELESS 8MM

## (undated) DEVICE — BLADE SCALPEL SAFETY #15 WITH PLASTIC GREEN HANDLE

## (undated) DEVICE — GLV 7 PROTEXIS (WHITE)

## (undated) DEVICE — XI 12MM AND STAPLER CANNULA SEAL

## (undated) DEVICE — SUT PLEDGET SOFT MEDIUM 1/4" X 1/8" X 1/16" X6

## (undated) DEVICE — PACK GENERAL LAPAROSCOPY

## (undated) DEVICE — XI STAPLER SUREFORM 60

## (undated) DEVICE — Device

## (undated) DEVICE — TUBING STRYKER PNEUMOCLEAR HIGH FLOW

## (undated) DEVICE — TUBING AIRSEAL TRI-LUMEN FILTERED

## (undated) DEVICE — TUBING PLUME AWAY 4.0